# Patient Record
Sex: FEMALE | Race: WHITE | NOT HISPANIC OR LATINO | Employment: OTHER | URBAN - METROPOLITAN AREA
[De-identification: names, ages, dates, MRNs, and addresses within clinical notes are randomized per-mention and may not be internally consistent; named-entity substitution may affect disease eponyms.]

---

## 2017-02-06 ENCOUNTER — TELEPHONE (OUTPATIENT)
Dept: PEDIATRICS | Facility: CLINIC | Age: 57
End: 2017-02-06

## 2017-02-06 ENCOUNTER — OFFICE VISIT (OUTPATIENT)
Dept: PEDIATRICS | Facility: CLINIC | Age: 57
End: 2017-02-06
Payer: OTHER MISCELLANEOUS

## 2017-02-06 VITALS
WEIGHT: 156.3 LBS | HEIGHT: 69 IN | SYSTOLIC BLOOD PRESSURE: 108 MMHG | TEMPERATURE: 98 F | BODY MASS INDEX: 23.15 KG/M2 | OXYGEN SATURATION: 97 % | DIASTOLIC BLOOD PRESSURE: 62 MMHG | HEART RATE: 62 BPM

## 2017-02-06 DIAGNOSIS — S76.211A GROIN STRAIN, RIGHT, INITIAL ENCOUNTER: Primary | ICD-10-CM

## 2017-02-06 PROCEDURE — 99213 OFFICE O/P EST LOW 20 MIN: CPT | Performed by: PHYSICIAN ASSISTANT

## 2017-02-06 RX ORDER — CYCLOBENZAPRINE HCL 10 MG
10 TABLET ORAL 3 TIMES DAILY PRN
Qty: 15 TABLET | Refills: 0 | Status: SHIPPED | OUTPATIENT
Start: 2017-02-06 | End: 2017-07-14

## 2017-02-06 NOTE — PROGRESS NOTES
"  SUBJECTIVE:                                                    Latesha Ruiz is a 57 year old female who presents to clinic today for the following health issues:    Concern - Groin pain - right     Onset: over a week     Lifted a bin with books and felt a pulling sensation    Began with back pain and has improved    Description:   Dull ache    Intensity: 3/10 currently, worst - 4-5/10    Progression of Symptoms:  same and constant    Accompanying Signs & Symptoms:     Lump present    No difficulty urinating    Slight constipation and improved    No history of kidney stones   Previous history of similar problem:   Has had back problems previously    Precipitating factors:   Worsened by: sitting  Pinching sensation in leg   Difficulty sleeping   No weakness in legs  No numbness or tingling    Alleviating factors:  Improved by: walking       Therapies Tried and outcome: ibuprofen, tylenol, heat pads, stretching: Symptoms not alleviated  Work: teaches music part time.  Problem list, Medication list, Allergies, and Medical/Social/Surgical histories reviewed in EPIC and updated as appropriate.    ROS:  ROS otherwise negative    OBJECTIVE:                                                    /62 mmHg  Pulse 62  Temp(Src) 98  F (36.7  C) (Tympanic)  Ht 5' 9.25\" (1.759 m)  Wt 156 lb 4.8 oz (70.897 kg)  BMI 22.91 kg/m2  SpO2 97%  Body mass index is 22.91 kg/(m^2).   GENERAL: alert, no distress  ABDOMEN: soft, no tenderness  Lumber/Thoracic Spine Exam: Tender:  Right low back tenderness--mild   Range of Motion: full  Strength:  Full  HIP: mildly tender to palpation over the mid groin. No swelling, redness, bruising. Full ROM.   Diagnostic test results:  No results found for this or any previous visit (from the past 24 hour(s)).     ASSESSMENT/PLAN:                                                    (B44.354M) Groin strain, right, initial encounter  (primary encounter diagnosis)  Comment: differential includes " lbp with sciatica. Likely strain after recent activity. Heat/ice, gentle stretches, muscle relaxant PRN.   Plan: cyclobenzaprine (FLEXERIL) 10 MG tablet          See Patient Instructions    Clementine Bueno PA-C  [facility]

## 2017-02-06 NOTE — MR AVS SNAPSHOT
"              After Visit Summary   2/6/2017    Latesha Ruiz    MRN: [id number]           Patient Information     Date Of Birth          1960        Visit Information        Provider Department      2/6/2017 9:30 AM Clementine Bueno PA-C [facility]        Today's Diagnoses     Groin strain, right, initial encounter    -  1       Care Instructions    Begin flexeril as needed  Heat/ice   Gentle stretches        Follow-ups after your visit        Who to contact     If you have questions or need follow up information about today's clinic visit or your schedule please contact [facility] directly at [phone].  Normal or non-critical lab and imaging results will be communicated to you by Gamzeehart, letter or phone within 4 business days after the clinic has received the results. If you do not hear from us within 7 days, please contact the clinic through Gamzeehart or phone. If you have a critical or abnormal lab result, we will notify you by phone as soon as possible.  Submit refill requests through Telepo or call your pharmacy and they will forward the refill request to us. Please allow 3 business days for your refill to be completed.          Additional Information About Your Visit        MyChart Information     Telepo gives you secure access to your electronic health record. If you see a primary care provider, you can also send messages to your care team and make appointments. If you have questions, please call your primary care clinic.  If you do not have a primary care provider, please call [phone] and they will assist you.        Care EveryWhere ID     This is your Care EveryWhere ID. This could be used by other organizations to access your [place] medical records  [id number]        Your Vitals Were     Pulse Temperature Height BMI (Body Mass Index) Pulse Oximetry       62 98  F (36.7  C) (Tympanic) 5' 9.25\" (1.759 m) 22.91 kg/m2 97%        Blood Pressure from " Last 3 Encounters:   02/06/17 108/62   12/17/16 98/60   09/02/16 116/74    Weight from Last 3 Encounters:   02/06/17 156 lb 4.8 oz (70.897 kg)   12/17/16 160 lb (72.576 kg)   09/02/16 159 lb (72.122 kg)              Today, you had the following     No orders found for display         Today's Medication Changes          These changes are accurate as of: 2/6/17 10:21 AM.  If you have any questions, ask your nurse or doctor.               Start taking these medicines.        Dose/Directions    cyclobenzaprine 10 MG tablet   Commonly known as:  FLEXERIL   Used for:  Groin strain, right, initial encounter   Started by:  Clementine Bueno PA-C        Dose:  10 mg   Take 1 tablet (10 mg) by mouth 3 times daily as needed for muscle spasms   Quantity:  15 tablet   Refills:  0            Where to get your medicines      These medications were sent to [facility] - [facility] [address]  [address], [facility] [address]     Phone:  [phone]    - cyclobenzaprine 10 MG tablet             Primary Care Provider Office Phone # Fax #    Maday Anaya [phone]       [facility] [address] [place] [address]        Thank you!     Thank you for choosing [facility]]  for your care. Our goal is always to provide you with excellent care. Hearing back from our patients is one way we can continue to improve our services. Please take a few minutes to complete the written survey that you may receive in the mail after your visit with us. Thank you!             Your Updated Medication List - Protect others around you: Learn how to safely use, store and throw away your medicines at www.disposemymeds.org.          This list is accurate as of: 2/6/17 10:21 AM.  Always use your most recent med list.                   Brand Name Dispense Instructions for use    cyclobenzaprine 10 MG tablet    FLEXERIL    15  tablet    Take 1 tablet (10 mg) by mouth 3 times daily as needed for muscle spasms

## 2017-02-06 NOTE — TELEPHONE ENCOUNTER
Patient states she has a history of back problems but it hadn't been an issue for a while. States she lifed something last week on Monday and has had pain into the hip ever since. States she attempted to be seen at [facility] and was told that she needed a referral. States she would like to be seen by a provider to see if that is the appropriate route to go. Appointment scheduled.

## 2017-02-06 NOTE — NURSING NOTE
"Chief Complaint   Patient presents with     Back Pain       Initial /62 mmHg  Pulse 62  Temp(Src) 98  F (36.7  C) (Tympanic)  Ht 5' 9.25\" (1.759 m)  Wt 156 lb 4.8 oz (70.897 kg)  BMI 22.91 kg/m2  SpO2 97% Estimated body mass index is 22.91 kg/(m^2) as calculated from the following:    Height as of this encounter: 5' 9.25\" (1.759 m).    Weight as of this encounter: 156 lb 4.8 oz (70.897 kg).  Medication Reconciliation: complete   Trinidad Santos CMA      "

## 2017-03-07 ENCOUNTER — OFFICE VISIT (OUTPATIENT)
Dept: URGENT CARE | Facility: URGENT CARE | Age: 57
End: 2017-03-07
Payer: COMMERCIAL

## 2017-03-07 VITALS
SYSTOLIC BLOOD PRESSURE: 108 MMHG | DIASTOLIC BLOOD PRESSURE: 56 MMHG | WEIGHT: 155 LBS | OXYGEN SATURATION: 98 % | TEMPERATURE: 100.5 F | HEART RATE: 95 BPM | BODY MASS INDEX: 22.72 KG/M2

## 2017-03-07 DIAGNOSIS — J02.9 ACUTE PHARYNGITIS, UNSPECIFIED: Primary | ICD-10-CM

## 2017-03-07 LAB
DEPRECATED S PYO AG THROAT QL EIA: NORMAL
MICRO REPORT STATUS: NORMAL
SPECIMEN SOURCE: NORMAL

## 2017-03-07 PROCEDURE — 87081 CULTURE SCREEN ONLY: CPT | Performed by: PHYSICIAN ASSISTANT

## 2017-03-07 PROCEDURE — 87880 STREP A ASSAY W/OPTIC: CPT | Performed by: PHYSICIAN ASSISTANT

## 2017-03-07 PROCEDURE — 99213 OFFICE O/P EST LOW 20 MIN: CPT | Performed by: FAMILY MEDICINE

## 2017-03-07 NOTE — MR AVS SNAPSHOT
After Visit Summary   3/7/2017    Latesha Ruiz    MRN: [id number]           Patient Information     Date Of Birth          1960        Visit Information        Provider Department      3/7/2017 5:00 PM Fidelina Newman MD [facility]        Today's Diagnoses     Acute pharyngitis, unspecified    -  1      Care Instructions    Ibuprofen 600 mg three times a day for 2-3 days.    Gargle with salt water.    We will call you if your strep culture turns positive.        Follow-ups after your visit        Who to contact     If you have questions or need follow up information about today's clinic visit or your schedule please contact [facility] directly at [phone].  Normal or non-critical lab and imaging results will be communicated to you by MyChart, letter or phone within 4 business days after the clinic has received the results. If you do not hear from us within 7 days, please contact the clinic through [employer] Grouphart or phone. If you have a critical or abnormal lab result, we will notify you by phone as soon as possible.  Submit refill requests through Distech Controls or call your pharmacy and they will forward the refill request to us. Please allow 3 business days for your refill to be completed.          Additional Information About Your Visit        MyChart Information     Dist[employer] gives you secure access to your electronic health record. If you see a primary care provider, you can also send messages to your care team and make appointments. If you have questions, please call your primary care clinic.  If you do not have a primary care provider, please call [phone] and they will assist you.        Care EveryWhere ID     This is your Care EveryWhere ID. This could be used by other organizations to access your [place] medical records  [id number]        Your Vitals Were     Pulse Temperature Pulse Oximetry BMI (Body Mass Index)          95 100.5  F (38.1  C) (Tympanic)  98% 22.72 kg/m2         Blood Pressure from Last 3 Encounters:   03/07/17 108/56   02/06/17 108/62   12/17/16 98/60    Weight from Last 3 Encounters:   03/07/17 155 lb (70.3 kg)   02/06/17 156 lb 4.8 oz (70.9 kg)   12/17/16 160 lb (72.6 kg)              We Performed the Following     Beta strep group A culture     Strep, Rapid Screen        Primary Care Provider Office Phone # Fax #    Maday Anaya [phone]       [address] [place] [address]        Thank you!     Thank you for choosing [facility]  for your care. Our goal is always to provide you with excellent care. Hearing back from our patients is one way we can continue to improve our services. Please take a few minutes to complete the written survey that you may receive in the mail after your visit with us. Thank you!             Your Updated Medication List - Protect others around you: Learn how to safely use, store and throw away your medicines at www.disposemymeds.org.          This list is accurate as of: 3/7/17  6:19 PM.  Always use your most recent med list.                   Brand Name Dispense Instructions for use    cyclobenzaprine 10 MG tablet    FLEXERIL    15 tablet    Take 1 tablet (10 mg) by mouth 3 times daily as needed for muscle spasms

## 2017-03-07 NOTE — NURSING NOTE
"Chief Complaint   Patient presents with     Urgent Care     Pharyngitis     Pt states since last night throat has felt swollen. Pt has not taken any otc medications.        Initial /56 (BP Location: Right arm, Patient Position: Chair, Cuff Size: Adult Regular)  Pulse 95  Temp 100.5  F (38.1  C) (Tympanic)  Wt 155 lb (70.3 kg)  SpO2 98%  BMI 22.72 kg/m2 Estimated body mass index is 22.72 kg/(m^2) as calculated from the following:    Height as of 2/6/17: 5' 9.25\" (1.759 m).    Weight as of this encounter: 155 lb (70.3 kg).  Medication Reconciliation: unable or not appropriate to perform.  Columba Agosto CMA ([facility]) 3/7/2017 5:47 PM    "

## 2017-03-08 NOTE — PATIENT INSTRUCTIONS
Ibuprofen 600 mg three times a day for 2-3 days.    Gargle with salt water.    We will call you if your strep culture turns positive.

## 2017-03-08 NOTE — PROGRESS NOTES
SUBJECTIVE:  Latesha Ruiz is a 57 year old female with a chief complaint of sore throat.  Onset of symptoms was last night.    Course of illness: sudden onset and still present.  Severity moderate  Current and Associated symptoms: cough   Treatment measures tried include None tried.  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    Past Medical History   Diagnosis Date     AK (actinic keratosis) 6/17/2013     Allergic rhinitis, cause unspecified      Closed fracture of metacarpal bone(s), site unspecified 12/04     Right 5th metacarpal fx--caught in a door     Closed fracture of rib(s), unspecified ~1995     Contact dermatitis and other eczema, due to unspecified cause      Disorder of bone and cartilage, unspecified ~4/2000     T-score -2.00 in L-spine in 5/03     Lumbago      Premature menopause      Elevated FSH/LH in 1993--normal brain MRI 12/2001     Current Outpatient Prescriptions   Medication Sig Dispense Refill     cyclobenzaprine (FLEXERIL) 10 MG tablet Take 1 tablet (10 mg) by mouth 3 times daily as needed for muscle spasms (Patient not taking: Reported on 3/7/2017) 15 tablet 0     Social History   Substance Use Topics     Smoking status: Never Smoker     Smokeless tobacco: Never Used     Alcohol use No       ROS:  Review of systems negative except as stated above.    OBJECTIVE:   /56 (BP Location: Right arm, Patient Position: Chair, Cuff Size: Adult Regular)  Pulse 95  Temp 100.5  F (38.1  C) (Tympanic)  Wt 155 lb (70.3 kg)  SpO2 98%  BMI 22.72 kg/m2  GENERAL APPEARANCE: healthy, alert and no distress EYES: anicteric sclerae, conjunctivae clear HENT: ear canals and TM's normal. Pharynx erythematous, no exudates NECK: supple, non-tender to palpation, anterior cervical adenopathy noted RESP: lungs clear to auscultation - no rales, rhonchi or wheezes CV: regular rate and rhythm, normal S1 S2, no murmur noted SKIN: no  suspicious lesions or rashes    Rapid Strep test is negative.  Culture pending.    ASSESSMENT:  Pharyngitis, likely viral    PLAN:   I discussed with the patient that a confirmatory strep culture will be performed and that she will be called if the culture is positive for strep.  We discussed other possible causes of pharyngitis including viruses.  Symptomatic treat with gargles, lozenges, and OTC analgesic as needed.  Recommend ibuprofen 600 mg TID for 2-3 days.  Follow-up with primary clinic if not improving over the next few days, sooner if significantly worse.

## 2017-03-09 LAB
BACTERIA SPEC CULT: NORMAL
MICRO REPORT STATUS: NORMAL
SPECIMEN SOURCE: NORMAL

## 2017-03-13 ENCOUNTER — OFFICE VISIT (OUTPATIENT)
Dept: URGENT CARE | Facility: URGENT CARE | Age: 57
End: 2017-03-13
Payer: COMMERCIAL

## 2017-03-13 VITALS
HEART RATE: 85 BPM | WEIGHT: 155 LBS | TEMPERATURE: 98.5 F | SYSTOLIC BLOOD PRESSURE: 112 MMHG | BODY MASS INDEX: 22.72 KG/M2 | DIASTOLIC BLOOD PRESSURE: 60 MMHG | OXYGEN SATURATION: 97 %

## 2017-03-13 DIAGNOSIS — J98.01 ACUTE BRONCHOSPASM: ICD-10-CM

## 2017-03-13 DIAGNOSIS — R05.9 COUGH: Primary | ICD-10-CM

## 2017-03-13 PROCEDURE — 99213 OFFICE O/P EST LOW 20 MIN: CPT | Performed by: PHYSICIAN ASSISTANT

## 2017-03-13 RX ORDER — AZITHROMYCIN 250 MG/1
TABLET, FILM COATED ORAL
Qty: 6 TABLET | Refills: 0 | Status: SHIPPED | OUTPATIENT
Start: 2017-03-13 | End: 2017-07-14

## 2017-03-13 RX ORDER — ALBUTEROL SULFATE 90 UG/1
2 AEROSOL, METERED RESPIRATORY (INHALATION) EVERY 4 HOURS PRN
Qty: 1 INHALER | Refills: 0 | Status: SHIPPED | OUTPATIENT
Start: 2017-03-13 | End: 2017-07-14

## 2017-03-13 RX ORDER — PREDNISONE 20 MG/1
40 TABLET ORAL DAILY
Qty: 10 TABLET | Refills: 0 | Status: SHIPPED | OUTPATIENT
Start: 2017-03-13 | End: 2017-03-18

## 2017-03-13 NOTE — MR AVS SNAPSHOT
After Visit Summary   3/13/2017    Latesha Ruiz    MRN: [id number]           Patient Information     Date Of Birth          1960        Visit Information        Provider Department      3/13/2017 9:00 AM Mikaela Carbera PA-C [facility]        Today's Diagnoses     Cough    -  1    Acute bronchospasm           Follow-ups after your visit        Who to contact     If you have questions or need follow up information about today's clinic visit or your schedule please contact [facility] directly at [phone].  Normal or non-critical lab and imaging results will be communicated to you by Correlixhart, letter or phone within 4 business days after the clinic has received the results. If you do not hear from us within 7 days, please contact the clinic through Recensust or phone. If you have a critical or abnormal lab result, we will notify you by phone as soon as possible.  Submit refill requests through Learnhive or call your pharmacy and they will forward the refill request to us. Please allow 3 business days for your refill to be completed.          Additional Information About Your Visit        MyChart Information     Learnhive gives you secure access to your electronic health record. If you see a primary care provider, you can also send messages to your care team and make appointments. If you have questions, please call your primary care clinic.  If you do not have a primary care provider, please call [phone] and they will assist you.        Care EveryWhere ID     This is your Care EveryWhere ID. This could be used by other organizations to access your [place] medical records  [id number]        Your Vitals Were     Pulse Temperature Pulse Oximetry BMI (Body Mass Index)          85 98.5  F (36.9  C) (Tympanic) 97% 22.72 kg/m2         Blood Pressure from Last 3 Encounters:   03/13/17 112/60   03/07/17 108/56   02/06/17 108/62    Weight from Last 3 Encounters:    03/13/17 155 lb (70.3 kg)   03/07/17 155 lb (70.3 kg)   02/06/17 156 lb 4.8 oz (70.9 kg)              Today, you had the following     No orders found for display         Today's Medication Changes          These changes are accurate as of: 3/13/17  9:39 AM.  If you have any questions, ask your nurse or doctor.               Start taking these medicines.        Dose/Directions    albuterol 108 (90 BASE) MCG/ACT Inhaler   Commonly known as:  PROAIR HFA/PROVENTIL HFA/VENTOLIN HFA   Used for:  Cough, Acute bronchospasm   Started by:  Mikaela Cabrera PA-C        Dose:  2 puff   Inhale 2 puffs into the lungs every 4 hours as needed for shortness of breath / dyspnea or wheezing   Quantity:  1 Inhaler   Refills:  0       azithromycin 250 MG tablet   Commonly known as:  ZITHROMAX   Used for:  Cough   Started by:  Mikaela Cabrera PA-C        Two tablets first day, then one tablet daily for four days.   Quantity:  6 tablet   Refills:  0       predniSONE 20 MG tablet   Commonly known as:  DELTASONE   Used for:  Cough, Acute bronchospasm   Started by:  Mikaela Cabrera PA-C        Dose:  40 mg   Take 2 tablets (40 mg) by mouth daily for 5 days   Quantity:  10 tablet   Refills:  0            Where to get your medicines      These medications were sent to [facility] - [facility] [address]  [address], [facility] [address]     Phone:  [phone]     albuterol 108 (90 BASE) MCG/ACT Inhaler    azithromycin 250 MG tablet    predniSONE 20 MG tablet                Primary Care Provider Office Phone # Fax #    Maday Anaya [phone]       [facility] [address] [place] [address] [address] [place] [address]        Thank you!     Thank you for choosing [facility]  for your care. Our goal is always to provide you with excellent care. Hearing back from our patients is one way we can continue to  improve our services. Please take a few minutes to complete the written survey that you may receive in the mail after your visit with us. Thank you!             Your Updated Medication List - Protect others around you: Learn how to safely use, store and throw away your medicines at www.disposemymeds.org.          This list is accurate as of: 3/13/17  9:39 AM.  Always use your most recent med list.                   Brand Name Dispense Instructions for use    albuterol 108 (90 BASE) MCG/ACT Inhaler    PROAIR HFA/PROVENTIL HFA/VENTOLIN HFA    1 Inhaler    Inhale 2 puffs into the lungs every 4 hours as needed for shortness of breath / dyspnea or wheezing       azithromycin 250 MG tablet    ZITHROMAX    6 tablet    Two tablets first day, then one tablet daily for four days.       cyclobenzaprine 10 MG tablet    FLEXERIL    15 tablet    Take 1 tablet (10 mg) by mouth 3 times daily as needed for muscle spasms       predniSONE 20 MG tablet    DELTASONE    10 tablet    Take 2 tablets (40 mg) by mouth daily for 5 days

## 2017-03-13 NOTE — NURSING NOTE
"Chief Complaint   Patient presents with     Urgent Care     Cough     Pt states still having a cough with green phlegm x 1 week ago.        Initial /60 (BP Location: Right arm, Patient Position: Chair, Cuff Size: Adult Regular)  Pulse 85  Temp 98.5  F (36.9  C) (Tympanic)  Wt 155 lb (70.3 kg)  SpO2 97%  BMI 22.72 kg/m2 Estimated body mass index is 22.72 kg/(m^2) as calculated from the following:    Height as of 2/6/17: 5' 9.25\" (1.759 m).    Weight as of this encounter: 155 lb (70.3 kg).  Medication Reconciliation: unable or not appropriate to perform   Columba Agosto CMA ([facility]) 3/13/2017 9:11 AM    "

## 2017-03-13 NOTE — PROGRESS NOTES
SUBJECTIVE:   Latesha Ruiz is a 57 year old female presenting with a chief complaint of cough for the past week that is productive of green mucous.  Started with ST.  No real nasal congestion.  Low grade fevers earlier in the week.  Has no SOB or chest pain . Worries about lungs.  Gets in to coughing attacks  Onset of symptoms was 1 week(s) ago.  Course of illness is same.    Severity moderate  Current and Associated symptoms: none  Treatment measures tried include Fluids.  Predisposing factors include None.    Past Medical History   Diagnosis Date     AK (actinic keratosis) 6/17/2013     Allergic rhinitis, cause unspecified      Closed fracture of metacarpal bone(s), site unspecified 12/04     Right 5th metacarpal fx--caught in a door     Closed fracture of rib(s), unspecified ~1995     Contact dermatitis and other eczema, due to unspecified cause      Disorder of bone and cartilage, unspecified ~4/2000     T-score -2.00 in L-spine in 5/03     Lumbago      Premature menopause      Elevated FSH/LH in 1993--normal brain MRI 12/2001     Current Outpatient Prescriptions   Medication Sig Dispense Refill     predniSONE (DELTASONE) 20 MG tablet Take 2 tablets (40 mg) by mouth daily for 5 days 10 tablet 0     azithromycin (ZITHROMAX) 250 MG tablet Two tablets first day, then one tablet daily for four days. 6 tablet 0     albuterol (PROAIR HFA/PROVENTIL HFA/VENTOLIN HFA) 108 (90 BASE) MCG/ACT Inhaler Inhale 2 puffs into the lungs every 4 hours as needed for shortness of breath / dyspnea or wheezing 1 Inhaler 0     cyclobenzaprine (FLEXERIL) 10 MG tablet Take 1 tablet (10 mg) by mouth 3 times daily as needed for muscle spasms (Patient not taking: Reported on 3/7/2017) 15 tablet 0     Social History   Substance Use Topics     Smoking status: Never Smoker     Smokeless tobacco: Never Used     Alcohol use No       ROS:  Review of systems negative except as stated above.    OBJECTIVE  :/60 (BP Location: Right arm,  Patient Position: Chair, Cuff Size: Adult Regular)  Pulse 85  Temp 98.5  F (36.9  C) (Tympanic)  Wt 155 lb (70.3 kg)  SpO2 97%  BMI 22.72 kg/m2  GENERAL APPEARANCE: healthy, alert and no distress  EYES: EOMI,  PERRL, conjunctiva clear  HENT: ear canals and TM's normal.  Nose and mouth without ulcers, erythema or lesions  NECK: supple, nontender, no lymphadenopathy  RESP: scattered upper airway rhonchi.    CV: regular rates and rhythm, normal S1 S2, no murmur noted  NEURO: Normal strength and tone, sensory exam grossly normal,  normal speech and mentation  SKIN: no suspicious lesions or rashes    assessment/plan:  (R05) Cough  (primary encounter diagnosis)  Comment:   Plan: predniSONE (DELTASONE) 20 MG tablet,         azithromycin (ZITHROMAX) 250 MG tablet,         albuterol (PROAIR HFA/PROVENTIL HFA/VENTOLIN         HFA) 108 (90 BASE) MCG/ACT Inhaler        Appears to be viral at this time and Prednisone burst and inhaler as needed.  To start Zithromax at end of week if not improved or fevers develop.  Continue with OTC med for sx relief.      (J98.01) Acute bronchospasm  Comment:   Plan: predniSONE (DELTASONE) 20 MG tablet, albuterol         (PROAIR HFA/PROVENTIL HFA/VENTOLIN HFA) 108 (90        BASE) MCG/ACT Inhaler         As above

## 2017-07-14 ENCOUNTER — OFFICE VISIT (OUTPATIENT)
Dept: PEDIATRICS | Facility: CLINIC | Age: 57
End: 2017-07-14
Payer: COMMERCIAL

## 2017-07-14 VITALS
WEIGHT: 158.9 LBS | OXYGEN SATURATION: 98 % | BODY MASS INDEX: 23.3 KG/M2 | HEART RATE: 60 BPM | TEMPERATURE: 98.1 F | DIASTOLIC BLOOD PRESSURE: 62 MMHG | SYSTOLIC BLOOD PRESSURE: 100 MMHG

## 2017-07-14 DIAGNOSIS — K64.5 THROMBOSED EXTERNAL HEMORRHOIDS: Primary | ICD-10-CM

## 2017-07-14 DIAGNOSIS — J30.89 CHRONIC NONSEASONAL ALLERGIC RHINITIS DUE TO OTHER ALLERGEN: ICD-10-CM

## 2017-07-14 DIAGNOSIS — Z12.11 SCREEN FOR COLON CANCER: ICD-10-CM

## 2017-07-14 PROCEDURE — 99213 OFFICE O/P EST LOW 20 MIN: CPT | Performed by: INTERNAL MEDICINE

## 2017-07-14 RX ORDER — FLUTICASONE PROPIONATE 50 MCG
1 SPRAY, SUSPENSION (ML) NASAL DAILY
Qty: 1 BOTTLE | Refills: 11 | Status: SHIPPED | OUTPATIENT
Start: 2017-07-14 | End: 2018-06-27

## 2017-07-14 NOTE — MR AVS SNAPSHOT
After Visit Summary   7/14/2017    Latesha Ruiz    MRN: [id number]           Patient Information     Date Of Birth          1960        Visit Information        Provider Department      7/14/2017 3:30 PM Miesha Ortega MD [facility]        Today's Diagnoses     Thrombosed external hemorrhoids    -  1    Chronic nonseasonal allergic rhinitis due to other allergen        Screen for colon cancer          Care Instructions      Treating Hemorrhoids: Self-Care    Follow your healthcare provider s advice about caring for your hemorrhoids at home. Some treatments help relieve symptoms right away. Others involve making changes in your diet and exercise habits. These can help ease constipation and prevent hemorrhoid symptoms from coming back.  Relieving symptoms  Your healthcare provider may prescribe anti-inflammatory medicine to help ease your symptoms. The following tips will also help relieve pain and swelling.    Take sitz baths. Taking a sitz bath means sitting in a few inches of warm bath water. Soaking for 10 minutes twice a day can provide welcome relief from painful hemorrhoids. It can also help the area stay clean.    Develop good bowel habits. Use the bathroom when you need to. Don t ignore the urge to move your bowels. This can lead to constipation, hard stools, and straining. Also, don t read while on the toilet. Sit only as long as needed. Wipe gently with soft, unscented toilet tissue or baby wipes.    Use ice packs. Placing an ice pack on a thrombosed external hemorrhoid can help relieve pain right away. It will also help reduce the blood clot. Use the ice for 15 to 20 minutes at a time. Keep a cloth between the ice and your skin to prevent skin damage.    Use other measures. Laxatives and enemas can help ease constipation. But use them only on your healthcare provider s advice. For symptom relief, try using cotton pads soaked in witch hazel. These are available at most  drugstores. Over-the-counter hemorrhoid ointments and petroleum jelly can also provide relief.  Add fiber to your diet  Adding fiber to your diet can help relieve constipation by making stools softer and easier to pass. To increase your fiber intake, your healthcare provider may recommend a bulking agent, such as psyllium. This is a high-fiber supplement available at most grocery stores and drugstores. Eating more fiber-rich foods will also help. There are two types of fiber:    Insoluble fiber is the main ingredient in bulking agents. It s also found in foods such as wheat bran, whole-grain breads, fresh fruits, and vegetables.    Soluble fiber is found in foods such as oat bran. Although soluble fiber is good for you, it may not ease constipation as much as foods high in insoluble fiber.  Drink more water  Along with a high-fiber diet, drinking more water can help ease constipation. This is because insoluble fiber absorbs water, making stools soft and bulky. Be sure to drink plenty of water throughout the day. Drinking fruit juices, such as prune juice or apple juice, can also help prevent constipation.  Get more exercise  Regular exercise aids digestion and helps prevent constipation. It s also great for your health. So talk with your healthcare provider about starting an exercise program. Low-impact activities, such as swimming or walking, are good places to start. Take it easy at first. And remember to drink plenty of water when you exercise.  High-fiber foods  High-fiber foods offer many benefits. By making your stools softer, they help heal and prevent swollen hemorrhoids. They may also help reduce the risk of colon and rectal cancer. Best of all, they re usually low in calories and taste great. Here are some examples of fiber-rich foods.    Whole grains, such as wheat bran, corn bran, and brown rice.    Vegetables, especially carrots, broccoli, cabbage, and peas.    Fruits, such as apples, bananas, raisins,  peaches, and pears.    Nuts and legumes, especially peanuts, lentils, and kidney beans.  Easy ways to add fiber  The tips below offer some simple ways to add more high-fiber foods to your meals.    Start your day with a high-fiber breakfast. Eat a wheat bran cereal along with a sliced banana. Or, try peanut butter on whole-wheat toast.    Eat carrot sticks for snacks. They re easy to prepare, taste great, and are low in calories.    Use whole-grain breads instead of white bread for sandwiches.    Eat fruits for treats. Try an apple and some raisins instead of a candy bar.   Date Last Reviewed: 7/1/2016 2000-2017 [employer]. [address] [place] [address], [place] [address]. All rights reserved. This information is not intended as a substitute for professional medical care. Always follow your healthcare professional's instructions.        Thrombosed Hemorrhoids    Hemorrhoids are swollen veins in the lower rectum and anus. They're similar to varicose veins that form in the legs. Hemorrhoids can happen inside the rectum (internal hemorrhoids). Or one may form at the anal opening (external hemorrhoids). Although they may bleed, most hemorrhoids aren't cause for concern. But a small blood clot (thrombus) can develop in an external hemorrhoid. This may lead to severe pain and sometimes bleeding.  When to go to the emergency room (ER)  If you have severe pain or excessive bleeding, seek immediate medical care.  What to expect in the ER  A healthcare provider is likely to check your anus and rectum using a slender, lighted tube (anoscope or proctoscope). A local anesthetic is given to ease any discomfort.  Treatment  Treatment recommendations include the following:    If the blood clot has formed within the past 48 to 72 hours, your healthcare provider may remove it from within the hemorrhoid. This is a simple procedure that can relieve pain. You will have a local anesthetic to keep you pain-free during the  procedure. A small incision is made in the skin, and the blood clot is removed. Stitches are generally not needed.    If more than 72 hours have passed, your healthcare provider will suggest home treatments. Simple home treatments can relieve your pain. These may include warm baths, ointments, suppositories, and witch hazel compresses. Many thrombosed hemorrhoids go away on their own in a few weeks.    If you have persistent bleeding or painful hemorrhoids, talk to your healthcare provider about possible treatment with banding, ligation, or removal (hemorrhoidectomy).  Tips for preventing hemorrhoids  Tips include the following:    Eat foods that are high in fiber and use fiber supplements to help prevent constipation.    Drink plenty of liquids.    Get regular exercise to help prevent constipation and promote good bowel function.   Date Last Reviewed: 6/1/2016 2000-2017 [employer]. [address] [place] [address]. All rights reserved. This information is not intended as a substitute for professional medical care. Always follow your healthcare professional's instructions.                Follow-ups after your visit        Additional Services     GASTROENTEROLOGY ADULT REF PROCEDURE ONLY       Last Lab Result: Creatinine (mg/dL)       Date                     Value                 08/01/2016               0.78             ----------  Body mass index is 23.3 kg/(m^2).      Patient will be contacted to schedule procedure.     Please be aware that coverage of these services is subject to the terms and limitations of your health insurance plan.  Call member services at your health plan with any benefit or coverage questions.  Any procedures must be performed at a [facility] facility OR coordinated by your clinic's referral office.    Please bring the following with you to your appointment:    (1) Any X-Rays, CTs or MRIs which have been performed.  Contact the facility where they were done to  arrange for  prior to your scheduled appointment.    (2) List of current medications   (3) This referral request   (4) Any documents/labs given to you for this referral                  Who to contact     If you have questions or need follow up information about today's clinic visit or your schedule please contact [facility] directly at [phone].  Normal or non-critical lab and imaging results will be communicated to you by MyChart, letter or phone within 4 business days after the clinic has received the results. If you do not hear from us within 7 days, please contact the clinic through [facility] or phone. If you have a critical or abnormal lab result, we will notify you by phone as soon as possible.  Submit refill requests through [facility] or call your pharmacy and they will forward the refill request to us. Please allow 3 business days for your refill to be completed.          Additional Information About Your Visit        [facility] Information     [facility] gives you secure access to your electronic health record. If you see a primary care provider, you can also send messages to your care team and make appointments. If you have questions, please call your primary care clinic.  If you do not have a primary care provider, please call [phone] and they will assist you.        Care EveryWhere ID     This is your Care EveryWhere ID. This could be used by other organizations to access your [facility] medical records  [id number]        Your Vitals Were     Pulse Temperature Pulse Oximetry BMI (Body Mass Index)          60 98.1  F (36.7  C) (Tympanic) 98% 23.3 kg/m2         Blood Pressure from Last 3 Encounters:   07/14/17 100/62   03/13/17 112/60   03/07/17 108/56    Weight from Last 3 Encounters:   07/14/17 158 lb 14.4 oz (72.1 kg)   03/13/17 155 lb (70.3 kg)   03/07/17 155 lb (70.3 kg)              We Performed the Following     GASTROENTEROLOGY ADULT REF PROCEDURE ONLY          Today's Medication  Changes          These changes are accurate as of: 7/14/17  4:06 PM.  If you have any questions, ask your nurse or doctor.               Start taking these medicines.        Dose/Directions    fluticasone 50 MCG/ACT spray   Commonly known as:  FLONASE   Used for:  Chronic nonseasonal allergic rhinitis due to other allergen   Started by:  Miesha Ortega MD        Dose:  1 spray   Spray 1 spray into both nostrils daily   Quantity:  1 Bottle   Refills:  11            Where to get your medicines      These medications were sent to [id number] [address], [facility] [address]     Phone:  [phone]     fluticasone 50 MCG/ACT spray                Primary Care Provider Office Phone # Fax #    Maday Anaya [phone]       [facility] [address] [place] [address]        Equal Access to Services     [facility]: Hadii aad ku hadasho Soomaali, waaxda luqadaha, qaybta kaalmada adeegyada, waxay idiin hayaan mark khshauna menjivar . So [facility] [phone].    ATENCIÓN: Si habla español, tiene a martinez disposición servicios gratuitos de asistencia lingüística. Llame al [phone].    We comply with applicable federal civil rights laws and [place] laws. We do not discriminate on the basis of race, color, national origin, age, disability sex, sexual orientation or gender identity.            Thank you!     Thank you for choosing [facility]  for your care. Our goal is always to provide you with excellent care. Hearing back from our patients is one way we can continue to improve our services. Please take a few minutes to complete the written survey that you may receive in the mail after your visit with us. Thank you!             Your Updated Medication List - Protect others around you: Learn how to safely use, store and throw away your medicines at  www.disposemymeds.org.          This list is accurate as of: 7/14/17  4:06 PM.  Always use your most recent med list.                   Brand Name Dispense Instructions for use Diagnosis    fluticasone 50 MCG/ACT spray    FLONASE    1 Bottle    Spray 1 spray into both nostrils daily    Chronic nonseasonal allergic rhinitis due to other allergen

## 2017-07-14 NOTE — PROGRESS NOTES
SUBJECTIVE:                                                    Latesha Ruiz is a 57 year old female who presents to clinic today for the following health issues:      Hemorrhoids       Duration: 1-2 weeks ago    Description:   Pain: YES- but more irritation  Itching: no     Accompanying signs and symptoms:   Blood in stool: had some in underwear but not on stool  Changes in stool pattern: no.  Tends to diarrhea, no constipation     History (similar episodes/previous evaluation): had a fissure in college with pain    Precipitating or alleviating factors: None    Therapies tried and outcome: none      Problem list and histories reviewed & adjusted, as indicated.  Additional history: as documented    Labs reviewed in EPIC    Reviewed and updated as needed this visit by clinical staff  Tobacco  Allergies  Meds  Problems  Med Hx  Surg Hx  Fam Hx  Soc Hx        Reviewed and updated as needed this visit by Provider  Allergies  Meds  Problems           OBJECTIVE:     /62 (BP Location: Right arm, Patient Position: Right side, Cuff Size: Adult Regular)  Pulse 60  Temp 98.1  F (36.7  C) (Tympanic)  Wt 158 lb 14.4 oz (72.1 kg)  SpO2 98%  BMI 23.3 kg/m2  Body mass index is 23.3 kg/(m^2).  GENERAL: healthy, alert and no distress  RECTAL (female): external thrombosed hemorrhoid, about 1cm diam, mildly tender        ASSESSMENT/PLAN:       1. Thrombosed external hemorrhoids  Given time of symptoms, not amenable to surgical treatment at this time.  Symptomatic cares, prevention discussed and discussed banding with GI or CR surgery at colonoscopy    2. Chronic nonseasonal allergic rhinitis due to other allergen  refilled  - fluticasone (FLONASE) 50 MCG/ACT spray; Spray 1 spray into both nostrils daily  Dispense: 1 Bottle; Refill: 11    3. Screen for colon cancer  Due, referral placed  - GASTROENTEROLOGY ADULT REF PROCEDURE ONLY    See Patient Instructions    Miesha Ortega MD  [facility]

## 2017-07-14 NOTE — PATIENT INSTRUCTIONS
Treating Hemorrhoids: Self-Care    Follow your healthcare provider s advice about caring for your hemorrhoids at home. Some treatments help relieve symptoms right away. Others involve making changes in your diet and exercise habits. These can help ease constipation and prevent hemorrhoid symptoms from coming back.  Relieving symptoms  Your healthcare provider may prescribe anti-inflammatory medicine to help ease your symptoms. The following tips will also help relieve pain and swelling.    Take sitz baths. Taking a sitz bath means sitting in a few inches of warm bath water. Soaking for 10 minutes twice a day can provide welcome relief from painful hemorrhoids. It can also help the area stay clean.    Develop good bowel habits. Use the bathroom when you need to. Don t ignore the urge to move your bowels. This can lead to constipation, hard stools, and straining. Also, don t read while on the toilet. Sit only as long as needed. Wipe gently with soft, unscented toilet tissue or baby wipes.    Use ice packs. Placing an ice pack on a thrombosed external hemorrhoid can help relieve pain right away. It will also help reduce the blood clot. Use the ice for 15 to 20 minutes at a time. Keep a cloth between the ice and your skin to prevent skin damage.    Use other measures. Laxatives and enemas can help ease constipation. But use them only on your healthcare provider s advice. For symptom relief, try using cotton pads soaked in witch hazel. These are available at most drugstores. Over-the-counter hemorrhoid ointments and petroleum jelly can also provide relief.  Add fiber to your diet  Adding fiber to your diet can help relieve constipation by making stools softer and easier to pass. To increase your fiber intake, your healthcare provider may recommend a bulking agent, such as psyllium. This is a high-fiber supplement available at most grocery stores and drugstores. Eating more fiber-rich foods will also help. There are two  types of fiber:    Insoluble fiber is the main ingredient in bulking agents. It s also found in foods such as wheat bran, whole-grain breads, fresh fruits, and vegetables.    Soluble fiber is found in foods such as oat bran. Although soluble fiber is good for you, it may not ease constipation as much as foods high in insoluble fiber.  Drink more water  Along with a high-fiber diet, drinking more water can help ease constipation. This is because insoluble fiber absorbs water, making stools soft and bulky. Be sure to drink plenty of water throughout the day. Drinking fruit juices, such as prune juice or apple juice, can also help prevent constipation.  Get more exercise  Regular exercise aids digestion and helps prevent constipation. It s also great for your health. So talk with your healthcare provider about starting an exercise program. Low-impact activities, such as swimming or walking, are good places to start. Take it easy at first. And remember to drink plenty of water when you exercise.  High-fiber foods  High-fiber foods offer many benefits. By making your stools softer, they help heal and prevent swollen hemorrhoids. They may also help reduce the risk of colon and rectal cancer. Best of all, they re usually low in calories and taste great. Here are some examples of fiber-rich foods.    Whole grains, such as wheat bran, corn bran, and brown rice.    Vegetables, especially carrots, broccoli, cabbage, and peas.    Fruits, such as apples, bananas, raisins, peaches, and pears.    Nuts and legumes, especially peanuts, lentils, and kidney beans.  Easy ways to add fiber  The tips below offer some simple ways to add more high-fiber foods to your meals.    Start your day with a high-fiber breakfast. Eat a wheat bran cereal along with a sliced banana. Or, try peanut butter on whole-wheat toast.    Eat carrot sticks for snacks. They re easy to prepare, taste great, and are low in calories.    Use whole-grain breads  instead of white bread for sandwiches.    Eat fruits for treats. Try an apple and some raisins instead of a candy bar.   Date Last Reviewed: 7/1/2016 2000-2017 [employer]. [address] [place] [address], [place] [address]. All rights reserved. This information is not intended as a substitute for professional medical care. Always follow your healthcare professional's instructions.        Thrombosed Hemorrhoids    Hemorrhoids are swollen veins in the lower rectum and anus. They're similar to varicose veins that form in the legs. Hemorrhoids can happen inside the rectum (internal hemorrhoids). Or one may form at the anal opening (external hemorrhoids). Although they may bleed, most hemorrhoids aren't cause for concern. But a small blood clot (thrombus) can develop in an external hemorrhoid. This may lead to severe pain and sometimes bleeding.  When to go to the emergency room (ER)  If you have severe pain or excessive bleeding, seek immediate medical care.  What to expect in the ER  A healthcare provider is likely to check your anus and rectum using a slender, lighted tube (anoscope or proctoscope). A local anesthetic is given to ease any discomfort.  Treatment  Treatment recommendations include the following:    If the blood clot has formed within the past 48 to 72 hours, your healthcare provider may remove it from within the hemorrhoid. This is a simple procedure that can relieve pain. You will have a local anesthetic to keep you pain-free during the procedure. A small incision is made in the skin, and the blood clot is removed. Stitches are generally not needed.    If more than 72 hours have passed, your healthcare provider will suggest home treatments. Simple home treatments can relieve your pain. These may include warm baths, ointments, suppositories, and witch hazel compresses. Many thrombosed hemorrhoids go away on their own in a few weeks.    If you have persistent bleeding or painful hemorrhoids,  talk to your healthcare provider about possible treatment with banding, ligation, or removal (hemorrhoidectomy).  Tips for preventing hemorrhoids  Tips include the following:    Eat foods that are high in fiber and use fiber supplements to help prevent constipation.    Drink plenty of liquids.    Get regular exercise to help prevent constipation and promote good bowel function.   Date Last Reviewed: 6/1/2016 2000-2017 [employer]. [address] [place] [address], [place] [address]. All rights reserved. This information is not intended as a substitute for professional medical care. Always follow your healthcare professional's instructions.

## 2017-07-26 ENCOUNTER — HOSPITAL ENCOUNTER (OUTPATIENT)
Dept: MAMMOGRAPHY | Facility: CLINIC | Age: 57
Discharge: HOME | End: 2017-07-26
Attending: PEDIATRICS | Admitting: PEDIATRICS
Payer: COMMERCIAL

## 2017-07-26 ENCOUNTER — OFFICE VISIT (OUTPATIENT)
Dept: SURGERY | Facility: CLINIC | Age: 57
End: 2017-07-26
Payer: COMMERCIAL

## 2017-07-26 VITALS
SYSTOLIC BLOOD PRESSURE: 92 MMHG | HEIGHT: 69 IN | WEIGHT: 157 LBS | HEART RATE: 64 BPM | OXYGEN SATURATION: 98 % | DIASTOLIC BLOOD PRESSURE: 60 MMHG | BODY MASS INDEX: 23.25 KG/M2

## 2017-07-26 DIAGNOSIS — Z12.31 VISIT FOR SCREENING MAMMOGRAM: ICD-10-CM

## 2017-07-26 DIAGNOSIS — K64.5 THROMBOSED EXTERNAL HEMORRHOIDS: Primary | ICD-10-CM

## 2017-07-26 PROCEDURE — 99203 OFFICE O/P NEW LOW 30 MIN: CPT | Performed by: SURGERY

## 2017-07-26 PROCEDURE — 77063 BREAST TOMOSYNTHESIS BI: CPT

## 2017-07-26 NOTE — MR AVS SNAPSHOT
After Visit Summary   7/26/2017    Latesha Ruiz    MRN: [id number]           Patient Information     Date Of Birth          1960        Visit Information        Provider Department      7/26/2017 10:45 AM Mario Leyva MD [facility]      Today's Diagnoses     Thrombosed external hemorrhoids    -  1       Follow-ups after your visit        Your next 10 appointments already scheduled     Aug 03, 2017   Procedure with Mario Leyva MD   [facility] ([facility])    [address]  [facility] [address]   [phone]           [facility] is located at [address] [place]            Aug 03, 2017 12:45 PM CDT   [facility] End[facility] with Mario Leyva MD   [facility] ([facility]    [facility])   [phone]              Who to contact     If you have questions or need follow up information about today's clinic visit or your schedule please contact [facility] directly at [phone].  Normal or non-critical lab and imaging results will be communicated to you by Travadorhart, letter or phone within 4 business days after the clinic has received the results. If you do not hear from us within 7 days, please contact the clinic through Travadorhart or phone. If you have a critical or abnormal lab result, we will notify you by phone as soon as possible.  Submit refill requests through iConclude or call your pharmacy and they will forward the refill request to us. Please allow 3 business days for your refill to be completed.          Additional Information About Your Visit        MyChart Information     iConclude gives you secure access to your electronic health record. If you see a primary care provider, you can also send messages to your  "care team and make appointments. If you have questions, please call your primary care clinic.  If you do not have a primary care provider, please call [phone] and they will assist you.        Care EveryWhere ID     This is your Care EveryWhere ID. This could be used by other organizations to access your [place] medical records  [id number]        Your Vitals Were     Pulse Height Pulse Oximetry BMI (Body Mass Index)          64 5' 9\" (1.753 m) 98% 23.18 kg/m2         Blood Pressure from Last 3 Encounters:   07/30/17 90/50   07/26/17 92/60   07/14/17 100/62    Weight from Last 3 Encounters:   07/30/17 160 lb 1.6 oz (72.6 kg)   07/26/17 157 lb (71.2 kg)   07/14/17 158 lb 14.4 oz (72.1 kg)              Today, you had the following     No orders found for display       Primary Care Provider Office Phone # Fax #    Maday Anaya [phone]       [place] [facility] [address] [place] [address]        Equal Access to Services     [facility] AH: Hadii aad ku hadasho Soomaali, waaxda luqadaha, qaybta kaalmada adelorayafrancesco, guillaume menjivar . So [facility] [phone].    ATENCIÓN: Si habla español, tiene a martinez disposición servicios gratuitos de asistencia lingüística. [facility] [phone].    We comply with applicable federal civil rights laws and [place] laws. We do not discriminate on the basis of race, color, national origin, age, disability sex, sexual orientation or gender identity.            Thank you!     Thank you for choosing [facility]  for your care. Our goal is always to provide you with excellent care. Hearing back from our patients is one way we can continue to improve our services. Please take a few minutes to complete the written survey that you may receive in the mail after your visit with us. Thank you!             Your Updated Medication List - Protect others around you: Learn how to safely use, store and throw away " your medicines at www.disposemymeds.org.          This list is accurate as of: 7/26/17 11:59 PM.  Always use your most recent med list.                   Brand Name Dispense Instructions for use Diagnosis    fluticasone 50 MCG/ACT spray    FLONASE    1 Bottle    Spray 1 spray into both nostrils daily    Chronic nonseasonal allergic rhinitis due to other allergen

## 2017-07-26 NOTE — PROGRESS NOTES
HPI      ROS (Review of Systems):      Positive for 10-Point Review.  10-Point Review has been done  All other systems reviewed and are negative.        Physical Exam

## 2017-07-26 NOTE — LETTER
2017      RE:  Latesha Ruiz-:  58    Latesha Ruiz is presenting at the request of Dr. Anaya for hemorrhoids. The patient has a colonoscopy upcoming which is scheduled with me. She has been having anal pain. Initially this is much more severe but now has been tapering off. She denies any significant bleeding or any prolapsing tissue. Her medical history is reviewed. On exam, there is a resolving thrombosed external hemorrhoid as well as small internal hemorrhoids. We discussed this. As far as the thrombosed external hemorrhoid considering the length of time that this has been there for do not incising it will be any benefit. I did discuss with her that if this happens again I would like her to call me quickly so that I could drain it in the clinic and hopefully speed her recovery. This by itself does not call for the need for hemorrhoid surgery. She does have smaller internal hemorrhoids but these have not been symptomatic with bleeding or prolapse and so I do not think that treatment here is mandatory. Treatment for them would involve banding however the external hemorrhoid cannot be treated in this fashion because this would be no different pain than crushing it for 3 days until it necroses and therefore banding is not indicated for external disease. I recommend that she proceed with colonoscopy and should only consider banding at the same time if she is having symptoms clearly referable to the internal hemorrhoids. For the external hemorrhoids I would like her to try to decrease straining as well as to maintain a 30 g per day fiber intake. 30 minutes were spent in this encounter, over 50% in counseling and coordination of care.      Mario Leyva MD  [phone] (c)  [phone] (p)      This note was created using voice recognition software. Undetected word substitutions or other errors may have occurred.

## 2017-07-30 ENCOUNTER — RADIANT APPOINTMENT (OUTPATIENT)
Dept: GENERAL RADIOLOGY | Facility: CLINIC | Age: 57
End: 2017-07-30
Attending: FAMILY MEDICINE
Payer: COMMERCIAL

## 2017-07-30 ENCOUNTER — OFFICE VISIT (OUTPATIENT)
Dept: URGENT CARE | Facility: URGENT CARE | Age: 57
End: 2017-07-30
Payer: COMMERCIAL

## 2017-07-30 VITALS
HEART RATE: 67 BPM | DIASTOLIC BLOOD PRESSURE: 50 MMHG | WEIGHT: 160.1 LBS | SYSTOLIC BLOOD PRESSURE: 90 MMHG | TEMPERATURE: 98.3 F | OXYGEN SATURATION: 98 % | BODY MASS INDEX: 23.64 KG/M2

## 2017-07-30 DIAGNOSIS — M25.461 EFFUSION OF RIGHT KNEE: ICD-10-CM

## 2017-07-30 DIAGNOSIS — M25.561 ACUTE PAIN OF RIGHT KNEE: ICD-10-CM

## 2017-07-30 DIAGNOSIS — M25.561 ACUTE PAIN OF RIGHT KNEE: Primary | ICD-10-CM

## 2017-07-30 PROCEDURE — 99214 OFFICE O/P EST MOD 30 MIN: CPT | Performed by: FAMILY MEDICINE

## 2017-07-30 PROCEDURE — 73562 X-RAY EXAM OF KNEE 3: CPT | Mod: RT

## 2017-07-30 NOTE — PATIENT INSTRUCTIONS
Rest, ice, elevation, ace wrap/compression to area.  Okay to take ibuprofen 200 mg - 4 tablets (800 mg) every 8 hours as needed.  Okay to take tylenol 500 mg - 2 tablets (1000 mg) every 6-8 hours as needed, do not exceed 3000 mg in 24 hours.      Understanding Baker s Cyst (Popliteal Cyst)  A Baker s cyst (popliteal cyst) is a fluid-filled sac that forms behind the knee.  Parts of the knee  The knee is a complex joint that has many parts. The lower end of the thighbone (femur) rotates on the upper end of the shinbone (tibia). There are several small bursae around the knee joint. These are small sacs filled with a special fluid (synovial fluid) that cushions the rest of the joint. Between the bones is a space that also contains this fluid.  What causes a Baker s cyst?  A small bursa sits just below the crease at the back of your knee. When this sac fills with too much fluid, it s called a Baker s cyst. This might happen when an injury or disease causes extra synovial fluid to leak into the bursa from the joint space.  In adults, other problems with the knee joint often cause the Baker s cyst. Injury or a knee disorder can change the normal structure of the knee joint. This can cause a cyst to form.  The synovial fluid inside the joint space may build up as a result of injury or disease. As the pressure builds up, the fluid may bulge into the back of the knee. This can cause the cyst.  Symptoms of a Baker s cyst  A Baker s cyst often doesn t cause symptoms. A cyst will more often be seen on an imaging test, like MRI, done for other reasons. If you do have symptoms, they may include:    Pain in the back of the knee    Knee stiffness    Sense of swelling or fullness behind the knee, especially when you straighten your leg    A swelling behind the knee that goes away when you bend your knee  These symptoms tend to get worse when standing for a long time, or being active.  Diagnosing a Baker s cyst  Your healthcare  provider will ask you about your medical history and your symptoms. He or she will give you a physical exam, which will include a careful exam of your knee. It s important to make sure your symptoms are caused by a Baker s cyst and not a tumor or a blood clot.  If the cause of your symptoms is not clear, you may have imaging tests, such as:    Ultrasound, to look at the cyst in more detail    X-ray, to get more information about the bones of the joint    MRI, if the diagnosis is still unclear after ultrasound, or if your health care provider is considering surgery  Date Last Reviewed: 3/3/2015    1138-7647 [employer]. [address] [place], [place] [id number]. All rights reserved. This information is not intended as a substitute for professional medical care. Always follow your healthcare professional's instructions.        Treatment for Baker s Cyst (Popliteal Cyst)  A Baker s cyst (popliteal cyst) is a fluid-filled sac that forms behind the knee.  Types of treatment  You likely won t need any treatment if you don t have any symptoms from your Baker s cyst. Some Baker s cysts go away without any treatment. If your cyst starts causing symptoms, you might need treatment at that time.  If you do have symptoms, you may be treated depending on the cause of your cyst. For example, you may need medicine for rheumatoid arthritis. Or you may need physical therapy for osteoarthritis.  Other treatments for a Baker s cyst can include:    Over-the-counter pain medicines    Arthrocentesis to remove extra fluid from the joint space    Steroid injection into the joint to reduce cyst size    Surgery to remove the cyst  Possible complications of a Baker s cyst  In rare cases, a Baker s cyst may cause complications. The cyst may get larger, which may cause redness and swelling. The cyst may also rupture, causing warmth, redness, and pain in your calf.  The symptoms may be the same as a blood clot in the veins of the  legs. Your health care provider may need imaging tests of your leg to make sure you don t have a clot. Rupture can also lead to its own complications, such as:    Trapping of a tibial nerve. This cases calf pain and numbness behind the leg. It can be treated with arthrocentesis and steroid injections.    Blockage of the popliteal artery. This causes pain and lack of blood flow to the leg. It can also be treated with arthrocentesis and steroid injections.    Compartment syndrome. This causes intense pain and problems moving the foot or toes. Compartment syndrome is a medical emergency. It needs immediate surgery. It can lead to permanent muscle damage if not treated right away.  When to call the health care provider  If your cyst starts causing mild symptoms, plan to see your health care provider soon. See him or her right away if you have symptoms such as redness and swelling of your leg. These symptoms may mean your Baker s cyst has ruptured.      Date Last Reviewed: 7/21/2015 2000-2017 [employer]. [address] [place] [address]. All rights reserved. This information is not intended as a substitute for professional medical care. Always follow your healthcare professional's instructions.        Water on the Knee    Water on the knee is also known as knee effusion. The knee joint normally has less than 1 ounce of fluid. Injury or inflammation of the knee joint causes extra fluid to collect there. When this happens, the knee joint looks swollen and is usually painful. It may be hard to fully bend the knee.  The most common cause of water on the knee is osteoarthritis due to wear and tear on the joint cartilage. Other causes include injury to the cartilage, inflammatory arthritis such as gout or rheumatoid arthritis, and infection of the joint.  You may need a needle aspiration, if the cause of your water on the knee is not certain. This procedure removes a sample of joint fluid from the  knee for testing. This is done with a local anesthetic. Removing excess fluid may also relieve swelling and pain.  Home care    Limit your activities. Stay off the injured leg as much as possible until pain improves.    Keep your leg elevated to reduce pain and swelling. When sleeping, place a pillow under the injured leg. When sitting, support the injured leg so it is level with your waist. This is very important during the first 48 hours.    Apply an ice pack over the injured area for 15 to 20 minutes every 3 to 6 hours. You should do this for the first 24 to 48 hours. You can make an ice pack by filling a plastic bag that seals at the top with ice cubes and then wrapping it with a thin towel. Continue to use ice packs for relief of pain and swelling as needed. As the ice melts, be careful to avoid getting your wrap, splint, or cast wet. After 48 hours, apply heat(warm shower or warm bath) for 15 to 20 minutes several times a day, or alternate ice and heat. If you have to wear a hook-and-loop knee brace, you can open it to apply the ice pack, or heat, directly to the knee. Never put ice directly on the skin. Always wrap the ice in a towel or other type of cloth.    You may use over-the-counter pain medicine to control pain, unless another pain medicine was prescribed. If you have chronic liver or kidney disease or have ever had a stomach ulcer or GI bleeding, talk with your healthcare provider before using these medicines.    If crutches or a walker have been recommended, do not put weight on the injured leg until you can do so without pain. Check with your healthcare provider before returning to sports or full work duties.    If you have a hook-and-loop knee brace, you can remove it to bathe and sleep, unless told otherwise.  Follow-up care  Follow up with your healthcare provider as advised.  If you are overweight, talk to your healthcare provider about a weight loss program. The excess weight puts extra strain  on your knees.  When to seek medical advice  Call your healthcare provider right away if any of these occur:    Increasing pain, redness, or swelling of the knee    Fever of 100.4 F (38 C) or above lasting for 24 to 48 hours  Date Last Reviewed: 11/23/2015 2000-2017 [employer]. [address] [place] [address], [place] [id number]. All rights reserved. This information is not intended as a substitute for professional medical care. Always follow your healthcare professional's instructions.

## 2017-07-30 NOTE — PROGRESS NOTES
SUBJECTIVE:  Chief Complaint   Patient presents with     Swelling     states swelling in Rt knee since wednesday, no known injury, no bruising.     Latesha Ruiz is a 57 year old female who presents with a chief complaint of right knee swelling.  Symptoms began 4 day(s) ago, are moderate and sudden onset  Context:  Injury:No. Patient states that was golfing about 1 month ago and felt knee was more stiff and tight but this resolved within a couple of days.  Patient denies any acute trauma, no problems with knee before.  Patient states that driving, steps will worsen symptoms.  Patient denies history of gout.  Denies any redness.  Swelling has persisted but has improved a little since this started.    Past Medical History:   Diagnosis Date     AK (actinic keratosis) 6/17/2013     Allergic rhinitis, cause unspecified      Closed fracture of metacarpal bone(s), site unspecified 12/04    Right 5th metacarpal fx--caught in a door     Closed fracture of rib(s), unspecified ~1995     Contact dermatitis and other eczema, due to unspecified cause      Disorder of bone and cartilage, unspecified ~4/2000    T-score -2.00 in L-spine in 5/03     Lumbago      Premature menopause     Elevated FSH/LH in 1993--normal brain MRI 12/2001     Current Outpatient Prescriptions   Medication Sig Dispense Refill     fluticasone (FLONASE) 50 MCG/ACT spray Spray 1 spray into both nostrils daily 1 Bottle 11     Social History   Substance Use Topics     Smoking status: Never Smoker     Smokeless tobacco: Never Used     Alcohol use No       ROS:  CONSTITUTIONAL:NEGATIVE for fever, chills, change in weight  INTEGUMENTARY/SKIN: NEGATIVE for worrisome rashes, moles or lesions  ENT/MOUTH: NEGATIVE for ear, mouth and throat problems  RESP:NEGATIVE for significant cough or SOB  CV: NEGATIVE for chest pain, palpitations or peripheral edema  GI: NEGATIVE for nausea, abdominal pain, heartburn, or change in bowel habits  MUSCULOSKELETAL: POSITIVE  for  joint pain and joint swelling     EXAM:   BP 90/50  Pulse 67  Temp 98.3  F (36.8  C) (Tympanic)  Wt 160 lb 1.6 oz (72.6 kg)  SpO2 98%  BMI 23.64 kg/m2  M/S Exam:right knee with swelling, tenderness to palpation in medial collateral and decreased ROM, no crepitus, no redness  GENERAL APPEARANCE: healthy, alert and no distress  EXTREMITIES: peripheral pulses normal  SKIN: no suspicious lesions or rashes  NEURO: Normal strength and tone, sensory exam grossly normal, mentation intact and speech normal    X-RAY was done - right knee - no acute fracture    ASSESSMENT/PLAN:  (M25.561) Acute pain of right knee  (primary encounter diagnosis)  Plan: XR Knee Right 3 Views, ORTHO          REFERRAL, order for DME            (M25.461) Effusion of right knee  Plan: order for DME            Reviewed etiology for knee pain and effusion, possible popliteal cyst.  Encourage rest, ice, elevation, DME for knee sleeve for comfort and support.  Referral to [facility] for further evaluation and recommendation.    Brody James MD

## 2017-07-30 NOTE — MR AVS SNAPSHOT
After Visit Summary   7/30/2017    Latesha Ruiz    MRN: [id number]           Patient Information     Date Of Birth          1960        Visit Information        Provider Department      7/30/2017 3:55 PM Brody James MD [facility]        Today's Diagnoses     Acute pain of right knee    -  1      Care Instructions      Rest, ice, elevation, ace wrap/compression to area.  Okay to take ibuprofen 200 mg - 4 tablets (800 mg) every 8 hours as needed.  Okay to take tylenol 500 mg - 2 tablets (1000 mg) every 6-8 hours as needed, do not exceed 3000 mg in 24 hours.      Understanding Baker s Cyst (Popliteal Cyst)  A Baker s cyst (popliteal cyst) is a fluid-filled sac that forms behind the knee.  Parts of the knee  The knee is a complex joint that has many parts. The lower end of the thighbone (femur) rotates on the upper end of the shinbone (tibia). There are several small bursae around the knee joint. These are small sacs filled with a special fluid (synovial fluid) that cushions the rest of the joint. Between the bones is a space that also contains this fluid.  What causes a Baker s cyst?  A small bursa sits just below the crease at the back of your knee. When this sac fills with too much fluid, it s called a Baker s cyst. This might happen when an injury or disease causes extra synovial fluid to leak into the bursa from the joint space.  In adults, other problems with the knee joint often cause the Baker s cyst. Injury or a knee disorder can change the normal structure of the knee joint. This can cause a cyst to form.  The synovial fluid inside the joint space may build up as a result of injury or disease. As the pressure builds up, the fluid may bulge into the back of the knee. This can cause the cyst.  Symptoms of a Baker s cyst  A Baker s cyst often doesn t cause symptoms. A cyst will more often be seen on an imaging test, like MRI, done for other reasons. If you do have  symptoms, they may include:    Pain in the back of the knee    Knee stiffness    Sense of swelling or fullness behind the knee, especially when you straighten your leg    A swelling behind the knee that goes away when you bend your knee  These symptoms tend to get worse when standing for a long time, or being active.  Diagnosing a Baker s cyst  Your healthcare provider will ask you about your medical history and your symptoms. He or she will give you a physical exam, which will include a careful exam of your knee. It s important to make sure your symptoms are caused by a Baker s cyst and not a tumor or a blood clot.  If the cause of your symptoms is not clear, you may have imaging tests, such as:    Ultrasound, to look at the cyst in more detail    X-ray, to get more information about the bones of the joint    MRI, if the diagnosis is still unclear after ultrasound, or if your health care provider is considering surgery  Date Last Reviewed: 3/3/2015    5085-1604 [employer]. [address] [place] [address]. All rights reserved. This information is not intended as a substitute for professional medical care. Always follow your healthcare professional's instructions.        Treatment for Baker s Cyst (Popliteal Cyst)  A Baker s cyst (popliteal cyst) is a fluid-filled sac that forms behind the knee.  Types of treatment  You likely won t need any treatment if you don t have any symptoms from your Baker s cyst. Some Baker s cysts go away without any treatment. If your cyst starts causing symptoms, you might need treatment at that time.  If you do have symptoms, you may be treated depending on the cause of your cyst. For example, you may need medicine for rheumatoid arthritis. Or you may need physical therapy for osteoarthritis.  Other treatments for a Baker s cyst can include:    Over-the-counter pain medicines    Arthrocentesis to remove extra fluid from the joint space    Steroid injection into  the joint to reduce cyst size    Surgery to remove the cyst  Possible complications of a Baker s cyst  In rare cases, a Baker s cyst may cause complications. The cyst may get larger, which may cause redness and swelling. The cyst may also rupture, causing warmth, redness, and pain in your calf.  The symptoms may be the same as a blood clot in the veins of the legs. Your health care provider may need imaging tests of your leg to make sure you don t have a clot. Rupture can also lead to its own complications, such as:    Trapping of a tibial nerve. This cases calf pain and numbness behind the leg. It can be treated with arthrocentesis and steroid injections.    Blockage of the popliteal artery. This causes pain and lack of blood flow to the leg. It can also be treated with arthrocentesis and steroid injections.    Compartment syndrome. This causes intense pain and problems moving the foot or toes. Compartment syndrome is a medical emergency. It needs immediate surgery. It can lead to permanent muscle damage if not treated right away.  When to call the health care provider  If your cyst starts causing mild symptoms, plan to see your health care provider soon. See him or her right away if you have symptoms such as redness and swelling of your leg. These symptoms may mean your Baker s cyst has ruptured.      Date Last Reviewed: 7/21/2015 2000-2017 [employer]. [address] [place] [address], [place] [address]. All rights reserved. This information is not intended as a substitute for professional medical care. Always follow your healthcare professional's instructions.        Water on the Knee    Water on the knee is also known as knee effusion. The knee joint normally has less than 1 ounce of fluid. Injury or inflammation of the knee joint causes extra fluid to collect there. When this happens, the knee joint looks swollen and is usually painful. It may be hard to fully bend the knee.  The most common cause of  water on the knee is osteoarthritis due to wear and tear on the joint cartilage. Other causes include injury to the cartilage, inflammatory arthritis such as gout or rheumatoid arthritis, and infection of the joint.  You may need a needle aspiration, if the cause of your water on the knee is not certain. This procedure removes a sample of joint fluid from the knee for testing. This is done with a local anesthetic. Removing excess fluid may also relieve swelling and pain.  Home care    Limit your activities. Stay off the injured leg as much as possible until pain improves.    Keep your leg elevated to reduce pain and swelling. When sleeping, place a pillow under the injured leg. When sitting, support the injured leg so it is level with your waist. This is very important during the first 48 hours.    Apply an ice pack over the injured area for 15 to 20 minutes every 3 to 6 hours. You should do this for the first 24 to 48 hours. You can make an ice pack by filling a plastic bag that seals at the top with ice cubes and then wrapping it with a thin towel. Continue to use ice packs for relief of pain and swelling as needed. As the ice melts, be careful to avoid getting your wrap, splint, or cast wet. After 48 hours, apply heat(warm shower or warm bath) for 15 to 20 minutes several times a day, or alternate ice and heat. If you have to wear a hook-and-loop knee brace, you can open it to apply the ice pack, or heat, directly to the knee. Never put ice directly on the skin. Always wrap the ice in a towel or other type of cloth.    You may use over-the-counter pain medicine to control pain, unless another pain medicine was prescribed. If you have chronic liver or kidney disease or have ever had a stomach ulcer or GI bleeding, talk with your healthcare provider before using these medicines.    If crutches or a walker have been recommended, do not put weight on the injured leg until you can do so without pain. Check with your  healthcare provider before returning to sports or full work duties.    If you have a hook-and-loop knee brace, you can remove it to bathe and sleep, unless told otherwise.  Follow-up care  Follow up with your healthcare provider as advised.  If you are overweight, talk to your healthcare provider about a weight loss program. The excess weight puts extra strain on your knees.  When to seek medical advice  Call your healthcare provider right away if any of these occur:    Increasing pain, redness, or swelling of the knee    Fever of 100.4 F (38 C) or above lasting for 24 to 48 hours  Date Last Reviewed: 11/23/2015 2000-2017 [employer]. [address] [place] [address], [place] [address]. All rights reserved. This information is not intended as a substitute for professional medical care. Always follow your healthcare professional's instructions.                Follow-ups after your visit        Additional Services     ORTHO  REFERRAL       [facility] is referring you to the Orthopedic  Services at [facility] [place].       The  Representative will assist you in the coordination of your Orthopedic and Musculoskeletal Care as prescribed by your physician.    The  Representative will call you within 1 business day to help schedule your appointment, or you may contact the  Representative at:    All areas ~ [phone]     Type of Referral : Non Surgical       Timeframe requested: 3 - 5 days    Coverage of these services is subject to the terms and limitations of your health insurance plan.  Please call member services at your health plan with any benefit or coverage questions.      If X-rays, CT or MRI's have been performed, please contact the facility where they were done to arrange for , prior to your scheduled appointment.  Please bring this referral request to your appointment and present it to your specialist.                   Your next 10 appointments already scheduled     Aug 03, 2017   Procedure with Mario Leyva MD   [facility] ([facility])    [address]  [facility] [address] [phone]           [facility] is located at [address] [place]            Aug 03, 2017 12:45 PM CDT   [facility] with Mario Leyva MD   [facility]    [facility])   [phone]              Who to contact     If you have questions or need follow up information about today's clinic visit or your schedule please contact [facility] directly at [phone].  Normal or non-critical lab and imaging results will be communicated to you by MyChart, letter or phone within 4 business days after the clinic has received the results. If you do not hear from us within 7 days, please contact the clinic through [employer] or phone. If you have a critical or abnormal lab result, we will notify you by phone as soon as possible.  Submit refill requests through [employer] or call your pharmacy and they will forward the refill request to us. Please allow 3 business days for your refill to be completed.          Additional Information About Your Visit        [employer] Information     [employer] gives you secure access to your electronic health record. If you see a primary care provider, you can also send messages to your care team and make appointments. If you have questions, please call your primary care clinic.  If you do not have a primary care provider, please call [phone] and they will assist you.        Care EveryWhere ID     This is your Care EveryWhere ID. This could be used by other organizations to access your [facility] medical records  [id number]        Your Vitals Were     Pulse Temperature Pulse Oximetry BMI (Body Mass Index)          67  98.3  F (36.8  C) (Tympanic) 98% 23.64 kg/m2         Blood Pressure from Last 3 Encounters:   07/30/17 90/50   07/26/17 92/60   07/14/17 100/62    Weight from Last 3 Encounters:   07/30/17 160 lb 1.6 oz (72.6 kg)   07/26/17 157 lb (71.2 kg)   07/14/17 158 lb 14.4 oz (72.1 kg)              We Performed the Following     ORTHO  REFERRAL        Primary Care Provider Office Phone # Fax #    Maday Anaya [phone]       [facility] [address] [place] [address]        Equal Access to Services     [facility]: Hadii aad tatiana hadasho Sopablito, waaxda luqadaha, qaybta kaalmada adeegyada, guillaume menjivar . So [facility] [phone].    ATENCIÓN: Si habla español, tiene a martinez disposición servicios gratuitos de asistencia lingüística. Llame al [phone].    We comply with applicable federal civil rights laws and [place] laws. We do not discriminate on the basis of race, color, national origin, age, disability sex, sexual orientation or gender identity.            Thank you!     Thank you for choosing [facility]  for your care. Our goal is always to provide you with excellent care. Hearing back from our patients is one way we can continue to improve our services. Please take a few minutes to complete the written survey that you may receive in the mail after your visit with us. Thank you!             Your Updated Medication List - Protect others around you: Learn how to safely use, store and throw away your medicines at www.disposemymeds.org.          This list is accurate as of: 7/30/17  5:35 PM.  Always use your most recent med list.                   Brand Name Dispense Instructions for use Diagnosis    fluticasone 50 MCG/ACT spray    FLONASE    1 Bottle    Spray 1 spray into both nostrils daily    Chronic nonseasonal allergic rhinitis due to other allergen

## 2017-08-01 ENCOUNTER — TRANSFERRED RECORDS (OUTPATIENT)
Dept: HEALTH INFORMATION MANAGEMENT | Facility: CLINIC | Age: 57
End: 2017-08-01

## 2017-08-01 NOTE — PROGRESS NOTES
Latesha Ruiz is presenting at the request of Dr. Anaya for hemorrhoids. The patient has a colonoscopy upcoming which is scheduled with me. She has been having anal pain. Initially this is much more severe but now has been tapering off. She denies any significant bleeding or any prolapsing tissue. Her medical history is reviewed. On exam, there is a resolving thrombosed external hemorrhoid as well as small internal hemorrhoids. We discussed this. As far as the thrombosed external hemorrhoid considering the length of time that this has been there for do not incising it will be any benefit. I did discuss with her that if this happens again I would like her to call me quickly so that I could drain it in the clinic and hopefully speed her recovery. This by itself does not call for the need for hemorrhoid surgery. She does have smaller internal hemorrhoids but these have not been symptomatic with bleeding or prolapse and so I do not think that treatment here is mandatory. Treatment for them would involve banding however the external hemorrhoid cannot be treated in this fashion because this would be no different pain than crushing it for 3 days until it necroses and therefore banding is not indicated for external disease. I recommend that she proceed with colonoscopy and should only consider banding at the same time if she is having symptoms clearly referable to the internal hemorrhoids. For the external hemorrhoids I would like her to try to decrease straining as well as to maintain a 30 g per day fiber intake. 30 minutes were spent in this encounter, over 50% in counseling and coordination of care.     Mario Leyva MD  [phone] (c)  [phone] (p)     This note was created using voice recognition software. Undetected word substitutions or other errors may have occurred.

## 2017-08-03 ENCOUNTER — APPOINTMENT (OUTPATIENT)
Dept: SURGERY | Facility: PHYSICIAN GROUP | Age: 57
End: 2017-08-03
Payer: COMMERCIAL

## 2017-08-03 ENCOUNTER — HOSPITAL ENCOUNTER (OUTPATIENT)
Facility: CLINIC | Age: 57
Discharge: HOME | End: 2017-08-03
Attending: SURGERY | Admitting: SURGERY
Payer: COMMERCIAL

## 2017-08-03 VITALS
OXYGEN SATURATION: 98 % | DIASTOLIC BLOOD PRESSURE: 57 MMHG | SYSTOLIC BLOOD PRESSURE: 99 MMHG | RESPIRATION RATE: 12 BRPM

## 2017-08-03 LAB — COLONOSCOPY: NORMAL

## 2017-08-03 PROCEDURE — 99152 MOD SED SAME PHYS/QHP 5/>YRS: CPT | Mod: PT | Performed by: SURGERY

## 2017-08-03 PROCEDURE — 25000128 H RX IP 250 OP 636: Performed by: SURGERY

## 2017-08-03 PROCEDURE — 99153 MOD SED SAME PHYS/QHP EA: CPT | Mod: PT | Performed by: SURGERY

## 2017-08-03 PROCEDURE — G0500 MOD SEDAT ENDO SERVICE >5YRS: HCPCS | Performed by: SURGERY

## 2017-08-03 PROCEDURE — 99153 MOD SED SAME PHYS/QHP EA: CPT | Performed by: SURGERY

## 2017-08-03 PROCEDURE — 88305 TISSUE EXAM BY PATHOLOGIST: CPT | Mod: 26 | Performed by: SURGERY

## 2017-08-03 PROCEDURE — 45385 COLONOSCOPY W/LESION REMOVAL: CPT | Mod: PT | Performed by: SURGERY

## 2017-08-03 PROCEDURE — 88305 TISSUE EXAM BY PATHOLOGIST: CPT | Performed by: SURGERY

## 2017-08-03 RX ORDER — FENTANYL CITRATE 50 UG/ML
INJECTION, SOLUTION INTRAMUSCULAR; INTRAVENOUS PRN
Status: DISCONTINUED | OUTPATIENT
Start: 2017-08-03 | End: 2017-08-03 | Stop reason: HOSPADM

## 2017-08-03 RX ORDER — ONDANSETRON 4 MG/1
4 TABLET, ORALLY DISINTEGRATING ORAL EVERY 6 HOURS PRN
Status: DISCONTINUED | OUTPATIENT
Start: 2017-08-03 | End: 2017-08-03 | Stop reason: HOSPADM

## 2017-08-03 RX ORDER — LIDOCAINE 40 MG/G
CREAM TOPICAL
Status: DISCONTINUED | OUTPATIENT
Start: 2017-08-03 | End: 2017-08-03 | Stop reason: HOSPADM

## 2017-08-03 RX ORDER — ONDANSETRON 2 MG/ML
4 INJECTION INTRAMUSCULAR; INTRAVENOUS EVERY 6 HOURS PRN
Status: DISCONTINUED | OUTPATIENT
Start: 2017-08-03 | End: 2017-08-03 | Stop reason: HOSPADM

## 2017-08-03 RX ORDER — FLUMAZENIL 0.1 MG/ML
0.2 INJECTION, SOLUTION INTRAVENOUS
Status: DISCONTINUED | OUTPATIENT
Start: 2017-08-03 | End: 2017-08-03 | Stop reason: HOSPADM

## 2017-08-03 RX ORDER — NALOXONE HYDROCHLORIDE 0.4 MG/ML
.1-.4 INJECTION, SOLUTION INTRAMUSCULAR; INTRAVENOUS; SUBCUTANEOUS
Status: DISCONTINUED | OUTPATIENT
Start: 2017-08-03 | End: 2017-08-03 | Stop reason: HOSPADM

## 2017-08-03 RX ORDER — ONDANSETRON 2 MG/ML
4 INJECTION INTRAMUSCULAR; INTRAVENOUS
Status: DISCONTINUED | OUTPATIENT
Start: 2017-08-03 | End: 2017-08-03 | Stop reason: HOSPADM

## 2017-08-03 NOTE — IP AVS SNAPSHOT
[facility]    [address] [facility] [address]    Phone:  [phone]    Fax:  [phone]                                       After Visit Summary   8/3/2017    Latesha Ruiz    MRN: [id number]           After Visit Summary Signature Page     I have received my discharge instructions, and my questions have been answered. I have discussed any challenges I see with this plan with the nurse or doctor.    ..........................................................................................................................................  Patient/Patient Representative Signature      ..........................................................................................................................................  Patient Representative Print Name and Relationship to Patient    ..................................................               ................................................  Date                                            Time    ..........................................................................................................................................  Reviewed by Signature/Title    ...................................................              ..............................................  Date                                                            Time

## 2017-08-03 NOTE — DISCHARGE INSTRUCTIONS
The patient has received a copy of the Provation  report the doctor has written and discharge instructions have been discussed with the patient and responsible adult.  All questions were addressed and answered prior to patient discharge.        Understanding Colon and Rectal Polyps     The colon has a smooth lining composed of millions of cells.     The colon (also called the large intestine) is a muscular tube that forms the last part of the digestive tract. It absorbs water and stores food waste. The colon is about 4 to 6 feet long. The rectum is the last 6 inches of the colon. The colon and rectum have a smooth lining composed of millions of cells. Changes in these cells can lead to growths in the colon that can become cancerous and should be removed.     When the Colon Lining Changes  Changes that occur in the cells that line the colon or rectum can lead to growths called polyps. Over a period of years, polyps can turn cancerous. Removing polyps early may prevent cancer from ever forming.      Polyps  Polyps are fleshy clumps of tissue that form on the lining of the colon or rectum. Small polyps are usually benign (not cancerous). However, over time, cells in a polyp can change and become cancerous. The larger a polyp grows, the more likely this is to happen. Also, certain types of polyps known as adenomatous polyps are considered premalignant. This means that they will almost always become cancerous if they re not removed.          Cancer  Almost all colorectal cancers start when polyp cells begin growing abnormally. As a cancerous tumor grows, it may involve more and more of the colon or rectum. In time, cancer can also grow beyond the colon or rectum and spread to nearby organs or to glands called lymph nodes. The cells can also travel to other parts of the body. This is known as metastasis. The earlier a cancerous tumor is removed, the better the chance of preventing its spread.        3174-1253 [employer], [address] [place] [address], [place] [address]. All rights reserved. This information is not intended as a substitute for professional medical care. Always follow your healthcare professional's instructions.      Eating a High-Fiber Diet  Fiber is what gives strength and structure to plants. Most grains, beans, vegetables, and fruits contain fiber. Foods rich in fiber are often low in calories and fat, and they fill you up more. They may also reduce your risks for certain health problems. To find out the amount of fiber in canned, packaged, or frozen foods, read the  Nutrition Facts  label. It tells you how much fiber is in a serving.      Types of Fiber and Their Benefits  There are two types of fiber: insoluble and soluble. They both aid digestion and help you maintain a healthy weight.  Insoluble fiber: This is found in whole grains, cereals, certain fruits and vegetables (such as apple skin, corn, and carrots). Insoluble fiber may prevent constipation and reduce the risk of certain types of cancer.   Soluble fiber: This type of fiber is in oats, beans, and certain fruits and vegetables (such as strawberries and peas). Soluble fiber can reduce cholesterol (which may help lower the risk of heart disease), and helps control blood sugar levels.  Look for High-Fiber Foods  Whole-grain breads and cereals: Try to eat 6-8 ounces a day. Include wheat and oat bran cereals, whole-wheat muffins or toast, and corn tortillas in your meals.  Fruits: Try to eat 2 cups a day. Apples, oranges, strawberries, pears, and bananas are good sources. (Note: Fruit juice is low in fiber.)  Vegetables: Try to eat 3 cups a day. Add asparagus, carrots, broccoli, peas, and corn to your meals.  Legumes (beans): One cup of cooked lentils gives you over 15 grams of fiber. Try navy beans, lentils, and chickpeas.  Seeds:  A small handful of seeds gives you about 3 grams of fiber. Try sunflower seeds.    Keep Track of Your Fiber  A healthy  diet includes 31 grams of fiber a day if you have a 2,000-calorie diet. Keep track of how much fiber you eat. Start by reading food labels. Then eat a variety of foods high in fiber. Ask your doctor about supplemental fiber products.            5572-1921 [employer], [address] [place] [address], [place] [address]. All rights reserved. This information is not intended as a substitute for professional medical care. Always follow your healthcare professional's instructions.      HIGH FIBER DIET  Fiber is present in all fruits, vegetables, cereals and grains. Fiber passes through the body undigested. A high fiber diet helps food move through the intestinal tract. The added bulk is helpful in preventing constipation. In people with diverticulosis it serves to clean out the pouches along the colon wall while preventing new ones from forming. A high fiber diet also reduces the risk of colon cancer, decreases blood cholesterol and prevents high blood sugar in people with diabetes.    The foods listed below are high in fiber and should be included in your diet. If you are not used to high fiber foods, start with 1 or 2 foods from this list. Every 3-4 days add a new one to your diet until you are eating 4 high fiber foods per day. This should give you 20-35 Gm of fiber/day. It is also important to drink a lot of water when you are on this diet (6-8 glasses a day). Water causes the fiber to swell and increases the benefit.    FOODS HIGH IN DIETARY FIBER:  BREADS: Made with 100% whole wheat flour; monica, wheat or rye crackers; tortillas, bran muffins  CEREALS: Whole grain cereal with bran (Chex, Raisin Bran, Corn Bran), oatmeal, rolled oats, granola, wheat flakes, brown rice  NUTS: Any nuts  FRUITS: All fresh fruits along with edible skins, (bananas, citrus fruit, mangoes, pears, prunes, raisins, apples, pineapple, apricot, melon, jams and marmalades), fruit juices (especially prune juice)  VEGETABLES: All types, preferably raw  or lightly cooked: especially, celery, eggplant, potatoes, spinach, broccoli, brussel sprouts, winter squash, carrots, cauliflower, soybeans, lentils, fresh and dried beans of all kinds  OTHER: Popcorn, any spices      0653-5911 [employer], [address] [place] [address], [place] [address]. All rights reserved. This information is not intended as a substitute for professional medical care. Always follow your healthcare professional's instructions.

## 2017-08-03 NOTE — LETTER
July 17, 2017      Latesha Ruiz  [address] [facility] [address]              Dear Latesha Ruiz,    Thank you for choosing [facility]. You are scheduled for the following service.     Date:  7/27/2017 Thursday             Procedure:  COLONOSCOPY  Doctor:        Dr. Leyva   Arrival Time:  10:45 AM  *check in at Emergency/Endoscopy desk*  Procedure Time:  11:00 AM   Location:   [facility]        Endoscopy Department, First Floor (Enter through ER Doors) *        [address] [place], [place] [address]      [phone] or [phone] () to reschedule        Colonoscopy is the most accurate test to detect colon polyps and colon cancer; and the only test where polyps can be removed. During this procedure, a doctor examines the lining of your large intestine and rectum through a flexible tube.           Transportation  Arrange for a ride for the day of your procedure with a responsible adult.  A taxi ride is not an option unless you are accompanied by a responsible adult. If you fail to arrange transportation with a responsible adult, your procedure will be cancelled and rescheduled.    MIRALAX -GATORADE  PREP    Purchase the following supplies at your local pharmacy:  - 2 (two) bisacodyl tablets: each tablet contains 5 mg.  (Dulcolax  laxative NOT Dulcolax  stool softener)   - 1 (one) 8.3 oz bottle of Polyethylene Glycol (PEG) 3350 Powder   (MiraLAX , Smooth LAX , ClearLAX  or equivalent)  - 64 oz Gatorade    Regular Gatorade, Gatorade G2 , Powerade , Powerade Zero  or Pedialyte  is acceptable. Red colored flavors are not allowed; all other colors (yellow, green, orange, purple and blue) are okay. It is also okay to buy two 2.12 oz packets of powdered Gatorade that can be mixed with water to a total volume of 64 oz of liquid.  - 1 (one) 10 oz bottle of Magnesium Citrate (Red colored flavors are not allowed)  It is also okay for  you to use a 0.5 oz package of powdered magnesium citrate (17 g) mixed with 10 oz of water.      PREPARATION FOR COLONOSCOPY    7 days before:    Discontinue fiber supplements and medications containing iron. This includes Metamucil  and Fibercon ; and multivitamins with iron.  3 days before:    Begin a low-fiber diet. A low-fiber diet helps making the cleanout more effective.     Examples of a low-fiber diet include (but are not limited to): white bread, white rice, pasta, crackers, fish, chicken, eggs, ground beef, creamy peanut butter, cooked/steamed/boiled vegetables, canned fruit, bananas, melons, milk, plain yogurt cheese, salad dressing and other condiments.     The following are not allowed on a low-fiber diet: seeds, nuts, popcorn, bran, whole wheat, corn, quinoa, raw fruits and vegetables, berries and dried fruit, beans and lentils.    For additional details on low-fiber diet, please refer to the table on the last page.  2 days before:    Continue the low-fiber diet.     Drink at least 8 glasses of water throughout the day.     Stop eating solid foods at 11:45 pm.  1 day before:    In the morning: begin a clear liquid diet (liquids you can see through).     Examples of a clear liquid diet include: water, clear broth or bouillon, Gatorade, Pedialyte or Powerade, carbonated and non-carbonated soft drinks (Sprite , 7-Up , ginger ale), strained fruit juices without pulp (apple, white grape, white cranberry), Jell-O  and popsicles.     The following are not allowed on a clear liquid diet: red liquids, alcoholic beverages, coffee, dairy products (milk, creamer, and yogurt), protein shakes, creamy broths, juice with pulp and chewing tobacco.    At noon: take 2 (two) bisacodyl tablets     At 4 (and no later than 6pm): start drinking the Miralax-Gatorade preparation (8.3 oz of Miralax mixed with 64 oz of Gatorade in a large pitcher). Drink 1(one) 8 oz glass every 15 minutes thereafter, until the mixture is  gone.    COLON CLEANSING TIPS: drink adequate amounts of fluids before and after your colon cleansing to prevent dehydration. Stay near a toilet because you will have diarrhea. Even if you are sitting on the toilet, continue to drink the cleansing solution every 15 minutes. If you feel nauseous or vomit, rinse your mouth with water, take a 15 to 30-minute-break and then continue drinking the solution. You will be uncomfortable until the stool has flushed from your colon (in about 2 to 4 hours). You may feel chilled.    Day of your procedure  You may take all of your morning medications including blood pressure medications, blood thinners (if you have not been instructed to stop these by our office), methadone, anti-seizure medications with sips of water 3 hours prior to your procedure or earlier. Do not take insulin or vitamins prior to your procedure. Continue the clear liquid diet.   4 hours prior: drink 10 oz of magnesium citrate. It may be easier to drink it with a straw.    STOP consuming all liquids after that.     Do not take anything by mouth during this time.     Allow extra time to travel to your procedure as you may need to stop and use a restroom along the way.  You are ready for the procedure, if you followed all instructions and your stool is no longer formed, but clear or yellow liquid. If you are unsure whether your colon is clean, please call our office at [phone] before you leave for your appointment.  Bring the following to your procedure:  - Insurance Card/Photo ID.   - List of current medications including over-the-counter medications and supplements.   - Your rescue inhaler if you currently use one to control asthma.      Canceling or rescheduling your appointment:   If you must cancel or reschedule your appointment, please call [phone] as soon as possible.      COLONOSCOPY PRE-PROCEDURE CHECKLIST  If you have diabetes, ask your regular doctor for diet and medication restrictions.  If  you take an anticoagulant or anti-platelet medication (such as Coumadin , Lovenox , Pradaxa , Xarelto , Eliquis , etc.), please call your primary doctor for advice on holding this medication.  If you take aspirin you may continue to do so.  If you are or may be pregnant, please discuss the risks and benefits of this procedure with your doctor.          What happens during a colonoscopy?    Plan to spend up to two hours, starting at registration time, at the endoscopy center the day of your procedure. The colonoscopy takes an average of 15 to 30 minutes. Recovery time is about 30 minutes.    Before the exam:    You will change into a gown.    Your medical history and medication list will be reviewed with you, unless that has been done over the phone prior to the procedure.     A nurse will insert an intravenous (IV) line into your hand or arm.    The doctor will meet with you and will give you a consent form to sign.    During the exam:     Medicine will be given through the IV line to help you relax.     Your heart rate and oxygen levels will be monitored. If your blood pressure is low, you may be given fluids through the IV line.     The doctor will insert a flexible hollow tube, called a colonoscope, into your rectum. The scope will be advanced slowly through the large intestine (colon).    You may have a feeling of fullness or pressure.     If an abnormal tissue or a polyp is found, the doctor may remove it through the endoscope for closer examination, or biopsy. Tissue removal is painless    After the exam:           Any tissue samples removed during the exam will be sent to a lab for evaluation. It may take 5-7 working days for you to be notified of the results.     A nurse will provide you with complete discharge instructions before you leave the endoscopy center. Be sure to ask the nurse for specific instructions if you take blood thinners such as Aspirin, Coumadin or Plavix.     The doctor will prepare a  full report for you and for the physician who referred you for the procedure.     Your doctor will talk with you about the initial results of your exam.      Medication given during the exam will prohibit you from driving for the rest of the day.     Following the exam, you may resume your normal diet. Your first meal should be light, no greasy foods. Avoid alcohol until the next day.     You may resume your regular activities the day after the procedure.     LOW-FIBER DIET    Foods RECOMMENDED Foods to AVOID   Breads, Cereal, Rice and Pasta:   White bread, rolls, biscuits, croissant and stormy toast.   Waffles, Northern Irish toast and pancakes.   White rice, noodles, pasta, macaroni and peeled cooked potatoes.   Plain crackers and saltines.   Cooked cereals: farina, cream of rice.   Cold cereals: Puffed Rice , Rice Krispies , Corn Flakes  and Special K    Breads, Cereal, Rice and Pasta:   Breads or rolls with nuts, seeds or fruit.   Whole wheat, pumpernickel, rye breads and cornbread.   Potatoes with skin, brown or wild rice, and kasha (buckwheat).     Vegetables:   Tender cooked and canned vegetables without seeds: carrots, asparagus tips, green or wax beans, pumpkin, spinach, lima beans. Vegetables:   Raw or steamed vegetables.   Vegetables with seeds.   Sauerkraut.   Winter squash, peas, broccoli, Brussel sprouts, cabbage, onions, cauliflower, baked beans, peas and corn.   Fruits:   Strained fruit juice.   Canned fruit, except pineapple.   Ripe bananas and melon. Fruits:   Prunes and prune juice.   Raw fruits.   Dried fruits: figs, dates and raisins.   Milk/Dairy:   Milk: plain or flavored.   Yogurt, custard and ice cream.   Cheese and cottage cheese Milk/Dairy:     Meat and other proteins:   ground, well-cooked tender beef, lamb, ham, veal, pork, fish, poultry and organ meats.   Eggs.   Peanut butter without nuts. Meat and other proteins:   Tough, fibrous meats with gristle.   Dry beans, peas and lentils.   Peanut  butter with nuts.   Tofu.   Fats, Snack, Sweets, Condiments and Beverages:   Margarine, butter, oils, mayonnaise, sour cream and salad dressing, plain gravy.   Sugar, hard candy, clear jelly, honey and syrup.   Spices, cooked herbs, bouillon, broth and soups made with allowed vegetable, ketchup and mustard.   Coffee, tea and carbonated drinks.   Plain cakes, cookies and pretzels.   Gelatin, plain puddings, custard, ice cream, sherbet and popsicles. Fats, Snack, Sweets, Condiments and Beverages:   Nuts, seeds and coconut.   Jam, marmalade and preserves.   Pickles, olives, relish and horseradish.   All desserts containing nuts, seeds, dried fruit and coconut; or made from whole grains or bran.   Candy made with nuts or seeds.   Popcorn.           DIRECTIONS TO THE ENDOSCOPY DEPARTMENT     From the north ([facility])  Take [address] South, exit on [address] [address]. Get into the left hand flip, turn left (east), go one-half mile to [address] and turn left. Go north to the first stoplight, take a right on [address] and follow it to the Emergency entrance.    From the south ([facility])  Take 35N to the [address]E split and exit on [address] [address]. On [address] [address], turn left (west) to [address]. Turn right (north) on [address]. Go north to the first stoplight, take a right on [address] and follow it to the Emergency entrance.    From the east via 35E ([facility])  Take [address] south to [address] [address] exit. Turn right on [address] . Go west to [address]. Turn right (north) on [address]. Go to the first stoplight, take a right and follow on [address] to the Emergency entrance.    From the east via [address] ([facility])  Take [address] to [address]. Turn left (south) on [address] to [address]. Turn left (east) on [address] and follow it to the Emergency entrance.    From the west via [address] ([place], [place])  Take  [address] east to [address]. Turn right (south) on [address] to [address] [employer] [address] [employer]. Turn left (east) on [address] and follow it to the Emergency entrance.

## 2017-08-03 NOTE — H&P
Pre-Endoscopy History and Physical     Latesha Ruiz MRN# [id number]   YOB: 1960 Age: 57 year old     Date of Procedure: 8/3/2017  Primary care provider: Maday Anaya  Type of Endoscopy: Colonoscopy with possible biopsy, possible polypectomy  Reason for Procedure: screening   Type of Anesthesia Anticipated: Conscious Sedation    HPI:    Latesha is a 57 year old female who will be undergoing the above procedure.      A history and physical has been performed. The patient's medications and allergies have been reviewed. The risks and benefits of the procedure and the sedation options and risks were discussed with the patient.  All questions were answered and informed consent was obtained.      She denies a personal or family history of anesthesia complications or bleeding disorders.     Patient Active Problem List   Diagnosis     Allergic rhinitis     Osteoporosis     RUSSEL (generalized anxiety disorder)     CARDIOVASCULAR SCREENING; LDL GOAL LESS THAN 160     Lactose intolerance     Stress incontinence     Sebaceous hyperplasia of face     AK (actinic keratosis)     Vitamin D deficiency        Past Medical History:   Diagnosis Date     AK (actinic keratosis) 2013     Allergic rhinitis, cause unspecified      Closed fracture of metacarpal bone(s), site unspecified     Right 5th metacarpal fx--caught in a door     Closed fracture of rib(s), unspecified ~     Contact dermatitis and other eczema, due to unspecified cause      Disorder of bone and cartilage, unspecified ~2000    T-score -2.00 in L-spine in      Lumbago      Premature menopause     Elevated FSH/LH in --normal brain MRI 2001        Past Surgical History:   Procedure Laterality Date     BREAST SURGERY  ,     benign lumps     C APPENDECTOMY       C  DELIVERY ONLY      Primary C/S - FTP     C  DELIVERY ONLY      Repeat C/S     C  DELIVERY ONLY      Repeat C/S, TL  "    C NONSPECIFIC PROCEDURE  1989    Breast Bx - Right       Social History   Substance Use Topics     Smoking status: Never Smoker     Smokeless tobacco: Never Used     Alcohol use No       Family History   Problem Relation Age of Onset     Psychotic Disorder Mother      anxiety     Thyroid Disease Mother      Born [birth year]. Has CAD, two stents placed.      C.A.D. Mother      Had MI/stents x 3 at age 84.     CANCER Mother      BCC of face     Family History Negative Father      Born . Resides in SNF. Has spinal stenosis, difficulty ambulating, also short term memory loss.      Breast Cancer Other      GASTROINTESTINAL DISEASE Son      gastroesophageal reflux     Neurologic Disorder Son      seizure disorder     Family History Negative Sister      Brief period of anxiety, born      Thyroid Disease Sister      Non cancerous growth on thyroid removed     Psychotic Disorder Brother      anxiety/Born      Respiratory Child      mild asthma/mild asthma     CANCER Paternal Grandfather      ?stomach CA (was a digestive organ)     Family History Negative Maternal Grandfather       age 74, pulmonary embolism. Had colitis     Breast Cancer Maternal Grandmother        Prior to Admission medications    Medication Sig Start Date End Date Taking? Authorizing Provider   order for DME Knee sleeve 17   Brody James MD   fluticasone (FLONASE) 50 MCG/ACT spray Spray 1 spray into both nostrils daily 17   Miesha Ortega MD       Allergies   Allergen Reactions     Asa [Aspirin]      Codeine Nausea and Vomiting     Erythromycin Nausea     Penicillins Hives        REVIEW OF SYSTEMS:   5 point ROS negative except as noted above in HPI, including Gen., Resp., CV, GI &  system review.    PHYSICAL EXAM:   There were no vitals taken for this visit. Estimated body mass index is 23.64 kg/(m^2) as calculated from the following:    Height as of 17: 1.753 m (5' 9\").    Weight as of 17: 72.6 kg (160 lb 1.6 oz). "   GENERAL APPEARANCE: alert, and oriented  MENTAL STATUS: alert  AIRWAY EXAM: Mallampatti Class II (visualization of the soft palate, fauces, and uvula)  RESP: lungs clear to auscultation - no rales, rhonchi or wheezes  CV: regular rates and rhythm  DIAGNOSTICS:    Not indicated    IMPRESSION   ASA Class 3 - Severe systemic disease, but not incapacitating    PLAN:   Plan for Colonoscopy with possible biopsy, possible polypectomy. We discussed the risks, benefits and alternatives and the patient wished to proceed.      Signed Electronically by: Mario Leyva  August 3, 2017

## 2017-08-03 NOTE — LETTER
July 18, 2017      Latesha Ruiz  [address] [facility] [address] [address]              Dear Latesha Ruiz,    Thank you for choosing [facility]. You are scheduled for the following service.     Date:  7/27/2017 Thursday             Procedure:  COLONOSCOPY  Doctor:        Dr. Mario Leyva   Arrival Time:  10:45 AM  *check in at Emergency/Endoscopy desk*  Procedure Time:  11:15 AM    Location:   [facility]        Endoscopy Department, First Floor (Enter through ER Doors) *        [address] [place], [place] [address]      [phone] or [phone] () to reschedule        Colonoscopy is the most accurate test to detect colon polyps and colon cancer; and the only test where polyps can be removed. During this procedure, a doctor examines the lining of your large intestine and rectum through a flexible tube.           Transportation  Arrange for a ride for the day of your procedure with a responsible adult.  A taxi ride is not an option unless you are accompanied by a responsible adult. If you fail to arrange transportation with a responsible adult, your procedure will be cancelled and rescheduled.    MIRALAX -GATORADE  PREP    Purchase the following supplies at your local pharmacy:  - 2 (two) bisacodyl tablets: each tablet contains 5 mg.  (Dulcolax  laxative NOT Dulcolax  stool softener)   - 1 (one) 8.3 oz bottle of Polyethylene Glycol (PEG) 3350 Powder   (MiraLAX , Smooth LAX , ClearLAX  or equivalent)  - 64 oz Gatorade    Regular Gatorade, Gatorade G2 , Powerade , Powerade Zero  or Pedialyte  is acceptable. Red colored flavors are not allowed; all other colors (yellow, green, orange, purple and blue) are okay. It is also okay to buy two 2.12 oz packets of powdered Gatorade that can be mixed with water to a total volume of 64 oz of liquid.  - 1 (one) 10 oz bottle of Magnesium Citrate (Red colored flavors are not allowed)  It is also  okay for you to use a 0.5 oz package of powdered magnesium citrate (17 g) mixed with 10 oz of water.      PREPARATION FOR COLONOSCOPY    7 days before:    Discontinue fiber supplements and medications containing iron. This includes Metamucil  and Fibercon ; and multivitamins with iron.  3 days before:    Begin a low-fiber diet. A low-fiber diet helps making the cleanout more effective.     Examples of a low-fiber diet include (but are not limited to): white bread, white rice, pasta, crackers, fish, chicken, eggs, ground beef, creamy peanut butter, cooked/steamed/boiled vegetables, canned fruit, bananas, melons, milk, plain yogurt cheese, salad dressing and other condiments.     The following are not allowed on a low-fiber diet: seeds, nuts, popcorn, bran, whole wheat, corn, quinoa, raw fruits and vegetables, berries and dried fruit, beans and lentils.    For additional details on low-fiber diet, please refer to the table on the last page.  2 days before:    Continue the low-fiber diet.     Drink at least 8 glasses of water throughout the day.     Stop eating solid foods at 11:45 pm.  1 day before:    In the morning: begin a clear liquid diet (liquids you can see through).     Examples of a clear liquid diet include: water, clear broth or bouillon, Gatorade, Pedialyte or Powerade, carbonated and non-carbonated soft drinks (Sprite , 7-Up , ginger ale), strained fruit juices without pulp (apple, white grape, white cranberry), Jell-O  and popsicles.     The following are not allowed on a clear liquid diet: red liquids, alcoholic beverages, coffee, dairy products (milk, creamer, and yogurt), protein shakes, creamy broths, juice with pulp and chewing tobacco.    At noon: take 2 (two) bisacodyl tablets     At 4 (and no later than 6pm): start drinking the Miralax-Gatorade preparation (8.3 oz of Miralax mixed with 64 oz of Gatorade in a large pitcher). Drink 1(one) 8 oz glass every 15 minutes thereafter, until the mixture is  gone.    COLON CLEANSING TIPS: drink adequate amounts of fluids before and after your colon cleansing to prevent dehydration. Stay near a toilet because you will have diarrhea. Even if you are sitting on the toilet, continue to drink the cleansing solution every 15 minutes. If you feel nauseous or vomit, rinse your mouth with water, take a 15 to 30-minute-break and then continue drinking the solution. You will be uncomfortable until the stool has flushed from your colon (in about 2 to 4 hours). You may feel chilled.    Day of your procedure  You may take all of your morning medications including blood pressure medications, blood thinners (if you have not been instructed to stop these by our office), methadone, anti-seizure medications with sips of water 3 hours prior to your procedure or earlier. Do not take insulin or vitamins prior to your procedure. Continue the clear liquid diet.   4 hours prior: drink 10 oz of magnesium citrate. It may be easier to drink it with a straw.    STOP consuming all liquids after that.     Do not take anything by mouth during this time.     Allow extra time to travel to your procedure as you may need to stop and use a restroom along the way.  You are ready for the procedure, if you followed all instructions and your stool is no longer formed, but clear or yellow liquid. If you are unsure whether your colon is clean, please call our office at [phone] before you leave for your appointment.  Bring the following to your procedure:  - Insurance Card/Photo ID.   - List of current medications including over-the-counter medications and supplements.   - Your rescue inhaler if you currently use one to control asthma.      Canceling or rescheduling your appointment:   If you must cancel or reschedule your appointment, please call [phone] as soon as possible.      COLONOSCOPY PRE-PROCEDURE CHECKLIST  If you have diabetes, ask your regular doctor for diet and medication restrictions.  If  you take an anticoagulant or anti-platelet medication (such as Coumadin , Lovenox , Pradaxa , Xarelto , Eliquis , etc.), please call your primary doctor for advice on holding this medication.  If you take aspirin you may continue to do so.  If you are or may be pregnant, please discuss the risks and benefits of this procedure with your doctor.          What happens during a colonoscopy?    Plan to spend up to two hours, starting at registration time, at the endoscopy center the day of your procedure. The colonoscopy takes an average of 15 to 30 minutes. Recovery time is about 30 minutes.    Before the exam:    You will change into a gown.    Your medical history and medication list will be reviewed with you, unless that has been done over the phone prior to the procedure.     A nurse will insert an intravenous (IV) line into your hand or arm.    The doctor will meet with you and will give you a consent form to sign.    During the exam:     Medicine will be given through the IV line to help you relax.     Your heart rate and oxygen levels will be monitored. If your blood pressure is low, you may be given fluids through the IV line.     The doctor will insert a flexible hollow tube, called a colonoscope, into your rectum. The scope will be advanced slowly through the large intestine (colon).    You may have a feeling of fullness or pressure.     If an abnormal tissue or a polyp is found, the doctor may remove it through the endoscope for closer examination, or biopsy. Tissue removal is painless    After the exam:           Any tissue samples removed during the exam will be sent to a lab for evaluation. It may take 5-7 working days for you to be notified of the results.     A nurse will provide you with complete discharge instructions before you leave the endoscopy center. Be sure to ask the nurse for specific instructions if you take blood thinners such as Aspirin, Coumadin or Plavix.     The doctor will prepare a  full report for you and for the physician who referred you for the procedure.     Your doctor will talk with you about the initial results of your exam.      Medication given during the exam will prohibit you from driving for the rest of the day.     Following the exam, you may resume your normal diet. Your first meal should be light, no greasy foods. Avoid alcohol until the next day.     You may resume your regular activities the day after the procedure.     LOW-FIBER DIET    Foods RECOMMENDED Foods to AVOID   Breads, Cereal, Rice and Pasta:   White bread, rolls, biscuits, croissant and stormy toast.   Waffles, Uzbek toast and pancakes.   White rice, noodles, pasta, macaroni and peeled cooked potatoes.   Plain crackers and saltines.   Cooked cereals: farina, cream of rice.   Cold cereals: Puffed Rice , Rice Krispies , Corn Flakes  and Special K    Breads, Cereal, Rice and Pasta:   Breads or rolls with nuts, seeds or fruit.   Whole wheat, pumpernickel, rye breads and cornbread.   Potatoes with skin, brown or wild rice, and kasha (buckwheat).     Vegetables:   Tender cooked and canned vegetables without seeds: carrots, asparagus tips, green or wax beans, pumpkin, spinach, lima beans. Vegetables:   Raw or steamed vegetables.   Vegetables with seeds.   Sauerkraut.   Winter squash, peas, broccoli, Brussel sprouts, cabbage, onions, cauliflower, baked beans, peas and corn.   Fruits:   Strained fruit juice.   Canned fruit, except pineapple.   Ripe bananas and melon. Fruits:   Prunes and prune juice.   Raw fruits.   Dried fruits: figs, dates and raisins.   Milk/Dairy:   Milk: plain or flavored.   Yogurt, custard and ice cream.   Cheese and cottage cheese Milk/Dairy:     Meat and other proteins:   ground, well-cooked tender beef, lamb, ham, veal, pork, fish, poultry and organ meats.   Eggs.   Peanut butter without nuts. Meat and other proteins:   Tough, fibrous meats with gristle.   Dry beans, peas and lentils.   Peanut  butter with nuts.   Tofu.   Fats, Snack, Sweets, Condiments and Beverages:   Margarine, butter, oils, mayonnaise, sour cream and salad dressing, plain gravy.   Sugar, hard candy, clear jelly, honey and syrup.   Spices, cooked herbs, bouillon, broth and soups made with allowed vegetable, ketchup and mustard.   Coffee, tea and carbonated drinks.   Plain cakes, cookies and pretzels.   Gelatin, plain puddings, custard, ice cream, sherbet and popsicles. Fats, Snack, Sweets, Condiments and Beverages:   Nuts, seeds and coconut.   Jam, marmalade and preserves.   Pickles, olives, relish and horseradish.   All desserts containing nuts, seeds, dried fruit and coconut; or made from whole grains or bran.   Candy made with nuts or seeds.   Popcorn.           DIRECTIONS TO THE ENDOSCOPY DEPARTMENT     From the north ([facility])  Take [address] South, exit on [address] [address]. Get into the left hand flip, turn left (east), go one-half mile to [address] and turn left. Go north to the first stoplight, take a right on [address] and follow it to the Emergency entrance.    From the south ([facility])  Take 35N to the 35E split and exit on [address] [address]. On [address] [address], turn left (west) to [address]. Turn right (north) on [address]. Go north to the first stoplight, take a right on [address] and follow it to the Emergency entrance.    From the east via 35E ([facility])  Take [address]E south to [address] [address] exit. Turn right on [address] . Go west to [address]. Turn right (north) on [address]. Go to the first stoplight, take a right and follow on [address] to the Emergency entrance.    From the east via [address] ([facility])  Take [address] to [address]. Turn left (south) on [address] to [address]. Turn left (east) on [address] and follow it to the Emergency entrance.    From the west via [address] ([place], [place])  Take  [address] east to [address]. Turn right (south) on [address] to [employer] Turn left (east) on [address] and follow it to the Emergency entrance.

## 2017-08-03 NOTE — IP AVS SNAPSHOT
MRN:[id number]                      After Visit Summary   8/3/2017    Latesha Ruiz    MRN: [id number]           Thank you!     Thank you for choosing [facility] for your care. Our goal is always to provide you with excellent care. Hearing back from our patients is one way we can continue to improve our services. Please take a few minutes to complete the written survey that you may receive in the mail after you visit. If you would like to speak to someone directly about your visit please contact Patient Relations at [phone]. Thank you!          Patient Information     Date Of Birth          1960        About your hospital stay     You were admitted on:  August 3, 2017 You last received care in the:  [facility]    You were discharged on:  August 3, 2017       Who to Call     For medical emergencies, please call 911.  For non-urgent questions about your medical care, please call your primary care provider or clinic, [phone]  For questions related to your surgery, please call your surgery clinic        Attending Provider     Provider Mario Herron MD Surgery       Primary Care Provider Office Phone # Fax #    Maday Anaya [phone]      Further instructions from your care team       The patient has received a copy of the Provation  report the doctor has written and discharge instructions have been discussed with the patient and responsible adult.  All questions were addressed and answered prior to patient discharge.        Understanding Colon and Rectal Polyps     The colon has a smooth lining composed of millions of cells.     The colon (also called the large intestine) is a muscular tube that forms the last part of the digestive tract. It absorbs water and stores food waste. The colon is about 4 to 6 feet long. The rectum is the last 6 inches of the colon. The colon and rectum have a smooth lining  composed of millions of cells. Changes in these cells can lead to growths in the colon that can become cancerous and should be removed.     When the Colon Lining Changes  Changes that occur in the cells that line the colon or rectum can lead to growths called polyps. Over a period of years, polyps can turn cancerous. Removing polyps early may prevent cancer from ever forming.      Polyps  Polyps are fleshy clumps of tissue that form on the lining of the colon or rectum. Small polyps are usually benign (not cancerous). However, over time, cells in a polyp can change and become cancerous. The larger a polyp grows, the more likely this is to happen. Also, certain types of polyps known as adenomatous polyps are considered premalignant. This means that they will almost always become cancerous if they re not removed.          Cancer  Almost all colorectal cancers start when polyp cells begin growing abnormally. As a cancerous tumor grows, it may involve more and more of the colon or rectum. In time, cancer can also grow beyond the colon or rectum and spread to nearby organs or to glands called lymph nodes. The cells can also travel to other parts of the body. This is known as metastasis. The earlier a cancerous tumor is removed, the better the chance of preventing its spread.        [id number] [facility], [address] [place] [address], [place] [address]. All rights reserved. This information is not intended as a substitute for professional medical care. Always follow your healthcare professional's instructions.      Eating a High-Fiber Diet  Fiber is what gives strength and structure to plants. Most grains, beans, vegetables, and fruits contain fiber. Foods rich in fiber are often low in calories and fat, and they fill you up more. They may also reduce your risks for certain health problems. To find out the amount of fiber in canned, packaged, or frozen foods, read the  Nutrition Facts  label. It tells you how much fiber is  in a serving.      Types of Fiber and Their Benefits  There are two types of fiber: insoluble and soluble. They both aid digestion and help you maintain a healthy weight.  Insoluble fiber: This is found in whole grains, cereals, certain fruits and vegetables (such as apple skin, corn, and carrots). Insoluble fiber may prevent constipation and reduce the risk of certain types of cancer.   Soluble fiber: This type of fiber is in oats, beans, and certain fruits and vegetables (such as strawberries and peas). Soluble fiber can reduce cholesterol (which may help lower the risk of heart disease), and helps control blood sugar levels.  Look for High-Fiber Foods  Whole-grain breads and cereals: Try to eat 6-8 ounces a day. Include wheat and oat bran cereals, whole-wheat muffins or toast, and corn tortillas in your meals.  Fruits: Try to eat 2 cups a day. Apples, oranges, strawberries, pears, and bananas are good sources. (Note: Fruit juice is low in fiber.)  Vegetables: Try to eat 3 cups a day. Add asparagus, carrots, broccoli, peas, and corn to your meals.  Legumes (beans): One cup of cooked lentils gives you over 15 grams of fiber. Try navy beans, lentils, and chickpeas.  Seeds:  A small handful of seeds gives you about 3 grams of fiber. Try sunflower seeds.    Keep Track of Your Fiber  A healthy diet includes 31 grams of fiber a day if you have a 2,000-calorie diet. Keep track of how much fiber you eat. Start by reading food labels. Then eat a variety of foods high in fiber. Ask your doctor about supplemental fiber products.            [id number] [employer], [address] [place] [address], [place] [address]. All rights reserved. This information is not intended as a substitute for professional medical care. Always follow your healthcare professional's instructions.      HIGH FIBER DIET  Fiber is present in all fruits, vegetables, cereals and grains. Fiber passes through the body undigested. A high fiber diet helps food move  through the intestinal tract. The added bulk is helpful in preventing constipation. In people with diverticulosis it serves to clean out the pouches along the colon wall while preventing new ones from forming. A high fiber diet also reduces the risk of colon cancer, decreases blood cholesterol and prevents high blood sugar in people with diabetes.    The foods listed below are high in fiber and should be included in your diet. If you are not used to high fiber foods, start with 1 or 2 foods from this list. Every 3-4 days add a new one to your diet until you are eating 4 high fiber foods per day. This should give you 20-35 Gm of fiber/day. It is also important to drink a lot of water when you are on this diet (6-8 glasses a day). Water causes the fiber to swell and increases the benefit.    FOODS HIGH IN DIETARY FIBER:  BREADS: Made with 100% whole wheat flour; monica, wheat or rye crackers; tortillas, bran muffins  CEREALS: Whole grain cereal with bran (Chex, Raisin Bran, Corn Bran), oatmeal, rolled oats, granola, wheat flakes, brown rice  NUTS: Any nuts  FRUITS: All fresh fruits along with edible skins, (bananas, citrus fruit, mangoes, pears, prunes, raisins, apples, pineapple, apricot, melon, jams and marmalades), fruit juices (especially prune juice)  VEGETABLES: All types, preferably raw or lightly cooked: especially, celery, eggplant, potatoes, spinach, broccoli, brussel sprouts, winter squash, carrots, cauliflower, soybeans, lentils, fresh and dried beans of all kinds  OTHER: Popcorn, any spices      [id number] [facility], [address] [place] [address]. All rights reserved. This information is not intended as a substitute for professional medical care. Always follow your healthcare professional's instructions.    Pending Results     No orders found from 8/1/2017 to 8/4/2017.            Admission Information     Date & Time Provider Department Dept. Phone    8/3/2017 Mario Leyva MD  [facility] [phone]      Your Vitals Were     Blood Pressure Respirations Pulse Oximetry             116/67 10 99%         [employer] Information     [employer] gives you secure access to your electronic health record. If you see a primary care provider, you can also send messages to your care team and make appointments. If you have questions, please call your primary care clinic.  If you do not have a primary care provider, please call [phone] and they will assist you.        Care EveryWhere ID     This is your Care EveryWhere ID. This could be used by other organizations to access your [facility] medical records  [id number]        Equal Access to Services     [facility]O [facility] : Hadii luh Barahona, edvin lucas, cherry doughertyalmafrancesco hunter, guillaume menjivar . So [facility] [phone].    ATENCIÓN: Si habla español, tiene a martinez disposición servicios gratuitos de asistencia lingüística. Llame al [phone].    We comply with applicable federal civil rights laws and [place] laws. We do not discriminate on the basis of race, color, national origin, age, disability sex, sexual orientation or gender identity.               Review of your medicines      CONTINUE these medicines which have NOT CHANGED        Dose / Directions    fluticasone 50 MCG/ACT spray   Commonly known as:  FLONASE   Used for:  Chronic nonseasonal allergic rhinitis due to other allergen        Dose:  1 spray   Spray 1 spray into both nostrils daily   Quantity:  1 Bottle   Refills:  11       order for DME   Used for:  Acute pain of right knee, Effusion of right knee        Knee sleeve   Quantity:  1 Device   Refills:  0                Protect others around you: Learn how to safely use, store and throw away your medicines at www.disposemymeds.org.             Medication List: This is a list of all your medications and when to take them. Check marks below indicate your daily home schedule. Keep this list  as a reference.      Medications           Morning Afternoon Evening Bedtime As Needed    fluticasone 50 MCG/ACT spray   Commonly known as:  FLONASE   Spray 1 spray into both nostrils daily                                order for DME   Knee sleeve

## 2017-08-04 LAB — COPATH REPORT: NORMAL

## 2017-09-05 ENCOUNTER — OFFICE VISIT (OUTPATIENT)
Dept: URGENT CARE | Facility: URGENT CARE | Age: 57
End: 2017-09-05
Payer: COMMERCIAL

## 2017-09-05 VITALS
WEIGHT: 157.1 LBS | SYSTOLIC BLOOD PRESSURE: 110 MMHG | HEART RATE: 58 BPM | DIASTOLIC BLOOD PRESSURE: 80 MMHG | BODY MASS INDEX: 23.2 KG/M2 | TEMPERATURE: 98.5 F | OXYGEN SATURATION: 99 %

## 2017-09-05 DIAGNOSIS — J02.9 ACUTE PHARYNGITIS, UNSPECIFIED: Primary | ICD-10-CM

## 2017-09-05 LAB
DEPRECATED S PYO AG THROAT QL EIA: NORMAL
SPECIMEN SOURCE: NORMAL

## 2017-09-05 PROCEDURE — 99213 OFFICE O/P EST LOW 20 MIN: CPT | Performed by: FAMILY MEDICINE

## 2017-09-05 PROCEDURE — 87081 CULTURE SCREEN ONLY: CPT | Performed by: FAMILY MEDICINE

## 2017-09-05 PROCEDURE — 87880 STREP A ASSAY W/OPTIC: CPT | Performed by: FAMILY MEDICINE

## 2017-09-05 NOTE — MR AVS SNAPSHOT
After Visit Summary   9/5/2017    Latesha Ruiz    MRN: [id number]           Patient Information     Date Of Birth          1960        Visit Information        Provider Department      9/5/2017 6:30 PM Fidelina Newman MD [facility]        Today's Diagnoses     Acute pharyngitis, unspecified    -  1       Follow-ups after your visit        Who to contact     If you have questions or need follow up information about today's clinic visit or your schedule please contact [facility] directly at [phone].  Normal or non-critical lab and imaging results will be communicated to you by [employer], letter or phone within 4 business days after the clinic has received the results. If you do not hear from us within 7 days, please contact the clinic through "[employer]t or phone. If you have a critical or abnormal lab result, we will notify you by phone as soon as possible.  Submit refill requests through [employer] or call your pharmacy and they will forward the refill request to us. Please allow 3 business days for your refill to be completed.          Additional Information About Your Visit        MyChart Information     [employer] gives you secure access to your electronic health record. If you see a primary care provider, you can also send messages to your care team and make appointments. If you have questions, please call your primary care clinic.  If you do not have a primary care provider, please call [phone] and they will assist you.        Care EveryWhere ID     This is your Care EveryWhere ID. This could be used by other organizations to access your [facility] medical records  [id number]        Your Vitals Were     Pulse Temperature Pulse Oximetry BMI (Body Mass Index)          58 98.5  F (36.9  C) (Tympanic) 99% 23.2 kg/m2         Blood Pressure from Last 3 Encounters:   09/05/17 110/80   08/03/17 99/57   07/30/17 90/50    Weight from Last 3 Encounters:   09/05/17  157 lb 1.6 oz (71.3 kg)   07/30/17 160 lb 1.6 oz (72.6 kg)   07/26/17 157 lb (71.2 kg)              We Performed the Following     Beta strep group A culture     Strep, Rapid Screen        Primary Care Provider Office Phone # Fax #    Maday Anaya [phone] [address] [place] [address]        Equal Access to Services     [facility]: Hadii aad ku hadasho Soomaali, waaxda luqadaha, qaybta kaalmada adeegyada, waxay idiin hayaan adeeg kharash la'aan . So [facility] [phone].    ATENCIÓN: Si gloriala espmagen, tiene a martinez disposición servicios gratuitos de asistencia lingüística. Llame al [phone].    We comply with applicable federal civil rights laws and [place] laws. We do not discriminate on the basis of race, color, national origin, age, disability sex, sexual orientation or gender identity.            Thank you!     Thank you for choosing [facility]  for your care. Our goal is always to provide you with excellent care. Hearing back from our patients is one way we can continue to improve our services. Please take a few minutes to complete the written survey that you may receive in the mail after your visit with us. Thank you!             Your Updated Medication List - Protect others around you: Learn how to safely use, store and throw away your medicines at www.disposemymeds.org.          This list is accurate as of: 9/5/17 11:59 PM.  Always use your most recent med list.                   Brand Name Dispense Instructions for use Diagnosis    fluticasone 50 MCG/ACT spray    FLONASE    1 Bottle    Spray 1 spray into both nostrils daily    Chronic nonseasonal allergic rhinitis due to other allergen       order for DME     1 Device    Knee sleeve    Acute pain of right knee, Effusion of right knee

## 2017-09-06 LAB
BACTERIA SPEC CULT: NORMAL
SPECIMEN SOURCE: NORMAL

## 2017-09-12 ENCOUNTER — RADIANT APPOINTMENT (OUTPATIENT)
Dept: GENERAL RADIOLOGY | Facility: CLINIC | Age: 57
End: 2017-09-12
Attending: PHYSICIAN ASSISTANT
Payer: COMMERCIAL

## 2017-09-12 ENCOUNTER — OFFICE VISIT (OUTPATIENT)
Dept: URGENT CARE | Facility: URGENT CARE | Age: 57
End: 2017-09-12
Payer: COMMERCIAL

## 2017-09-12 VITALS
WEIGHT: 157 LBS | HEART RATE: 76 BPM | RESPIRATION RATE: 16 BRPM | OXYGEN SATURATION: 96 % | DIASTOLIC BLOOD PRESSURE: 60 MMHG | TEMPERATURE: 98.2 F | SYSTOLIC BLOOD PRESSURE: 115 MMHG | BODY MASS INDEX: 23.18 KG/M2

## 2017-09-12 DIAGNOSIS — J02.9 ACUTE PHARYNGITIS, UNSPECIFIED ETIOLOGY: ICD-10-CM

## 2017-09-12 DIAGNOSIS — J02.9 ACUTE PHARYNGITIS, UNSPECIFIED ETIOLOGY: Primary | ICD-10-CM

## 2017-09-12 LAB
DEPRECATED S PYO AG THROAT QL EIA: NORMAL
SPECIMEN SOURCE: NORMAL

## 2017-09-12 PROCEDURE — 87880 STREP A ASSAY W/OPTIC: CPT | Performed by: PHYSICIAN ASSISTANT

## 2017-09-12 PROCEDURE — 71020 XR CHEST 2 VW: CPT

## 2017-09-12 PROCEDURE — 87081 CULTURE SCREEN ONLY: CPT | Performed by: PHYSICIAN ASSISTANT

## 2017-09-12 PROCEDURE — 99213 OFFICE O/P EST LOW 20 MIN: CPT | Performed by: PHYSICIAN ASSISTANT

## 2017-09-12 NOTE — MR AVS SNAPSHOT
After Visit Summary   9/12/2017    Latesha Ruiz    MRN: [id number]           Patient Information     Date Of Birth          1960        Visit Information        Provider Department      9/12/2017 8:30 PM Tomas Shukla PA-C [facility]        Today's Diagnoses     Acute pharyngitis, unspecified etiology    -  1      Care Instructions      With distilled water 2-3x per day for a minimum 2-3 days    Upper Respiratory Infection: Your infection is most likely a virus.  Try longer hot steamy showers, or head over steamy water and drink at least 8 glasses of water daily.  Maintain a healthy diet to help your immune system combat the infection.  If you continue to have symptoms or they become worse, please follow up. VOice rest            Follow-ups after your visit        Who to contact     If you have questions or need follow up information about today's clinic visit or your schedule please contact [facility] directly at [phone].  Normal or non-critical lab and imaging results will be communicated to you by [employer], letter or phone within 4 business days after the clinic has received the results. If you do not hear from us within 7 days, please contact the clinic through [employer] or phone. If you have a critical or abnormal lab result, we will notify you by phone as soon as possible.  Submit refill requests through [employer] or call your pharmacy and they will forward the refill request to us. Please allow 3 business days for your refill to be completed.          Additional Information About Your Visit        MyChart Information     [employer] gives you secure access to your electronic health record. If you see a primary care provider, you can also send messages to your care team and make appointments. If you have questions, please call your primary care clinic.  If you do not have a primary care provider, please call [phone] and they will assist  you.        Care EveryWhere ID     This is your Care EveryWhere ID. This could be used by other organizations to access your [place] medical records  [id number]        Your Vitals Were     Pulse Temperature Respirations Pulse Oximetry BMI (Body Mass Index)       76 98.2  F (36.8  C) (Oral) 16 96% 23.18 kg/m2        Blood Pressure from Last 3 Encounters:   09/12/17 115/60   09/05/17 110/80   08/03/17 99/57    Weight from Last 3 Encounters:   09/12/17 157 lb (71.2 kg)   09/05/17 157 lb 1.6 oz (71.3 kg)   07/30/17 160 lb 1.6 oz (72.6 kg)              We Performed the Following     Beta strep group A culture     Strep, Rapid Screen        Primary Care Provider Office Phone # Fax #    Maday Anaya [phone] [address] [place] [address]        Equal Access to Services     [facility]: Hadii luh goldsteino Sopablito, waaxda luqadaha, qaybta kaalmada adelorayada, guillaume menjivar . So [facility] [phone].    ATENCIÓN: Si habla español, tiene a martinez disposición servicios gratuitos de asistencia lingüística. Llame al [phone].    We comply with applicable federal civil rights laws and [place] laws. We do not discriminate on the basis of race, color, national origin, age, disability sex, sexual orientation or gender identity.            Thank you!     Thank you for choosing [facility]  for your care. Our goal is always to provide you with excellent care. Hearing back from our patients is one way we can continue to improve our services. Please take a few minutes to complete the written survey that you may receive in the mail after your visit with us. Thank you!             Your Updated Medication List - Protect others around you: Learn how to safely use, store and throw away your medicines at www.disposemymeds.org.          This list is accurate as of: 9/12/17  9:17 PM.  Always use your most recent med list.                   Brand Name  Dispense Instructions for use Diagnosis    fluticasone 50 MCG/ACT spray    FLONASE    1 Bottle    Spray 1 spray into both nostrils daily    Chronic nonseasonal allergic rhinitis due to other allergen       order for DME     1 Device    Knee sleeve    Acute pain of right knee, Effusion of right knee

## 2017-09-12 NOTE — PROGRESS NOTES
SUBJECTIVE:  Latesha Ruiz is a 57 year old female with a chief complaint of exposure to strep.  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  Has had a scratchy throat but no known fevers, no rashes, and no other URI symptoms at this time.  She thinks this is likely related to sleeping in the hospital for a couple nights.    Past Medical History:   Diagnosis Date     AK (actinic keratosis) 6/17/2013     Allergic rhinitis, cause unspecified      Closed fracture of metacarpal bone(s), site unspecified 12/04    Right 5th metacarpal fx--caught in a door     Closed fracture of rib(s), unspecified ~1995     Contact dermatitis and other eczema, due to unspecified cause      Disorder of bone and cartilage, unspecified ~4/2000    T-score -2.00 in L-spine in 5/03     Lumbago      Premature menopause     Elevated FSH/LH in 1993--normal brain MRI 12/2001     Current Outpatient Prescriptions   Medication Sig Dispense Refill     order for DME Knee sleeve 1 Device 0     fluticasone (FLONASE) 50 MCG/ACT spray Spray 1 spray into both nostrils daily 1 Bottle 11     Social History   Substance Use Topics     Smoking status: Never Smoker     Smokeless tobacco: Never Used     Alcohol use No     OBJECTIVE:   /80  Pulse 58  Temp 98.5  F (36.9  C) (Tympanic)  Wt 157 lb 1.6 oz (71.3 kg)  SpO2 99%  BMI 23.2 kg/m2  GENERAL APPEARANCE: healthy, alert and no distress EYES: anicteric sclerae, conjunctivae clear HENT: ear canals and TM's normal. Pharynx erythematous, no exudates NECK: supple, non-tender to palpation, no adenopathy noted SKIN: no suspicious lesions or rashes    Rapid Strep test is negative.  Culture pending.    ASSESSMENT:  Pharyngitis, likely viral possibly with irritation from very dry air while staying with son in hospital    PLAN:   I discussed with the patient that a confirmatory strep culture will be  performed and that she will be called if the culture is positive for strep.  We discussed other possible causes of pharyngitis including viruses.  Symptomatic treat with gargles, lozenges, and OTC analgesic as needed.  Follow-up with primary clinic if not improving over the next 3-5 days, sooner if significantly worse.

## 2017-09-13 LAB
BACTERIA SPEC CULT: NORMAL
SPECIMEN SOURCE: NORMAL

## 2017-09-13 NOTE — PATIENT INSTRUCTIONS
With distilled water 2-3x per day for a minimum 2-3 days    Upper Respiratory Infection: Your infection is most likely a virus.  Try longer hot steamy showers, or head over steamy water and drink at least 8 glasses of water daily.  Maintain a healthy diet to help your immune system combat the infection.  If you continue to have symptoms or they become worse, please follow up. VOice rest

## 2017-09-13 NOTE — PROGRESS NOTES
SUBJECTIVE:  Latesha Ruiz is a 57 year old female with a chief complaint of sore throat.  Onset of symptoms was 7 day(s) ago.    Course of illness: still present and improving.  Severity mild and moderate  Current and Associated symptoms: cough , sore throat and hoarse voice      Past Medical History:   Diagnosis Date     AK (actinic keratosis) 6/17/2013     Allergic rhinitis, cause unspecified      Closed fracture of metacarpal bone(s), site unspecified 12/04    Right 5th metacarpal fx--caught in a door     Closed fracture of rib(s), unspecified ~1995     Contact dermatitis and other eczema, due to unspecified cause      Disorder of bone and cartilage, unspecified ~4/2000    T-score -2.00 in L-spine in 5/03     Lumbago      Premature menopause     Elevated FSH/LH in 1993--normal brain MRI 12/2001     Current Outpatient Prescriptions   Medication Sig Dispense Refill     order for DME Knee sleeve 1 Device 0     fluticasone (FLONASE) 50 MCG/ACT spray Spray 1 spray into both nostrils daily (Patient not taking: Reported on 9/12/2017) 1 Bottle 11     Social History   Substance Use Topics     Smoking status: Never Smoker     Smokeless tobacco: Never Used     Alcohol use No       ROS:  Review of systems negative except as stated above.    OBJECTIVE:   /60 (Cuff Size: Adult Regular)  Pulse 76  Temp 98.2  F (36.8  C) (Oral)  Resp 16  Wt 157 lb (71.2 kg)  SpO2 96%  BMI 23.18 kg/m2  GENERAL APPEARANCE: mild hoarseness to voice.  healthy, alert and no distress  EYES: conjunctiva clear  HENT: ear canals and TM's normal.  Nose normal.  Pharynx erythematous  NECK: supple, non-tender to palpation, no adenopathy noted  RESP: lungs clear to auscultation - no rales, rhonchi or wheezes  CV: regular rates and rhythm, normal S1 S2, no murmur noted    Results for orders placed or performed in visit on 09/12/17   Strep, Rapid Screen   Result Value Ref Range    Specimen Description Throat     Rapid Strep A Screen        NEGATIVE: No Group A streptococcal antigen detected by immunoassay, await culture report.   Beta strep group A culture   Result Value Ref Range    Specimen Description Throat     Culture Micro No beta hemolytic Streptococcus Group A isolated          ASSESSMENT:  (J02.9) Acute pharyngitis, unspecified etiology  (primary encounter diagnosis)  Comment: Mild presentation, exposure to strep and pneumonia  Plan: Strep, Rapid Screen, XR Chest 2 Views, Beta         strep group A culture  Voice rest  Follow up with PCP if symptoms worsen or fail to improve  Patient Instructions     With distilled water 2-3x per day for a minimum 2-3 days    Upper Respiratory Infection: Your infection is most likely a virus.  Try longer hot steamy showers, or head over steamy water and drink at least 8 glasses of water daily.  Maintain a healthy diet to help your immune system combat the infection.  If you continue to have symptoms or they become worse, please follow up. VOice rest

## 2017-09-13 NOTE — NURSING NOTE
"Chief Complaint   Patient presents with     Urgent Care     pt c/o laryngitis x 7 days and cough with tightness in chest --x 4 days--non-productive cough some sinus drainag--son had strep and pneumonia last week       Initial /60 (Cuff Size: Adult Regular)  Pulse 76  Temp 98.2  F (36.8  C) (Oral)  Resp 16  Wt 157 lb (71.2 kg)  SpO2 96%  BMI 23.18 kg/m2 Estimated body mass index is 23.18 kg/(m^2) as calculated from the following:    Height as of 7/26/17: 5' 9\" (1.753 m).    Weight as of this encounter: 157 lb (71.2 kg).  Medication Reconciliation: complete    "

## 2017-12-31 DIAGNOSIS — Z20.828 EXPOSURE TO THE FLU: Primary | ICD-10-CM

## 2017-12-31 RX ORDER — OSELTAMIVIR PHOSPHATE 75 MG/1
75 CAPSULE ORAL DAILY
Qty: 10 CAPSULE | Refills: 0 | Status: SHIPPED | OUTPATIENT
Start: 2017-12-31 | End: 2018-06-27

## 2018-01-06 ENCOUNTER — HEALTH MAINTENANCE LETTER (OUTPATIENT)
Age: 58
End: 2018-01-06

## 2018-03-23 ENCOUNTER — TELEPHONE (OUTPATIENT)
Dept: SLEEP MEDICINE | Facility: CLINIC | Age: 58
End: 2018-03-23

## 2018-06-26 ENCOUNTER — DOCUMENTATION ONLY (OUTPATIENT)
Dept: LAB | Facility: CLINIC | Age: 58
End: 2018-06-26

## 2018-06-26 ENCOUNTER — TELEPHONE (OUTPATIENT)
Dept: PEDIATRICS | Facility: CLINIC | Age: 58
End: 2018-06-26

## 2018-06-26 DIAGNOSIS — E55.9 VITAMIN D DEFICIENCY: Primary | ICD-10-CM

## 2018-06-26 DIAGNOSIS — Z00.00 ROUTINE GENERAL MEDICAL EXAMINATION AT A HEALTH CARE FACILITY: ICD-10-CM

## 2018-06-26 NOTE — PROGRESS NOTES
Patient has an up coming lab appointment on  06/27/18 . Please review and place future orders that may be needed.    Thank you,  Lillie Crocker MLT

## 2018-06-26 NOTE — TELEPHONE ENCOUNTER
Patient has lab scheduled 6/27/18 and would like her Vitamin D level checked. She will be fasting and would like her cholesterol and sugars checked also.

## 2018-06-27 ENCOUNTER — OFFICE VISIT (OUTPATIENT)
Dept: PEDIATRICS | Facility: CLINIC | Age: 58
End: 2018-06-27
Payer: COMMERCIAL

## 2018-06-27 VITALS
HEIGHT: 70 IN | TEMPERATURE: 98.2 F | DIASTOLIC BLOOD PRESSURE: 60 MMHG | BODY MASS INDEX: 22.62 KG/M2 | WEIGHT: 158 LBS | OXYGEN SATURATION: 98 % | SYSTOLIC BLOOD PRESSURE: 92 MMHG | HEART RATE: 57 BPM

## 2018-06-27 DIAGNOSIS — Z23 NEED FOR ZOSTER VACCINATION: ICD-10-CM

## 2018-06-27 DIAGNOSIS — Z00.00 ROUTINE GENERAL MEDICAL EXAMINATION AT A HEALTH CARE FACILITY: ICD-10-CM

## 2018-06-27 DIAGNOSIS — Z00.00 ROUTINE GENERAL MEDICAL EXAMINATION AT A HEALTH CARE FACILITY: Primary | ICD-10-CM

## 2018-06-27 DIAGNOSIS — Z12.31 VISIT FOR SCREENING MAMMOGRAM: ICD-10-CM

## 2018-06-27 DIAGNOSIS — D22.9 MULTIPLE PIGMENTED NEVI: ICD-10-CM

## 2018-06-27 DIAGNOSIS — R79.89 ELEVATED LFTS: Primary | ICD-10-CM

## 2018-06-27 DIAGNOSIS — E55.9 VITAMIN D DEFICIENCY: ICD-10-CM

## 2018-06-27 DIAGNOSIS — Z78.0 ASYMPTOMATIC POSTMENOPAUSAL STATUS: ICD-10-CM

## 2018-06-27 LAB
ALBUMIN SERPL-MCNC: 3.8 G/DL (ref 3.4–5)
ALP SERPL-CCNC: 60 U/L (ref 40–150)
ALT SERPL W P-5'-P-CCNC: 59 U/L (ref 0–50)
ANION GAP SERPL CALCULATED.3IONS-SCNC: 11 MMOL/L (ref 3–14)
AST SERPL W P-5'-P-CCNC: 30 U/L (ref 0–45)
BILIRUB SERPL-MCNC: 0.8 MG/DL (ref 0.2–1.3)
BUN SERPL-MCNC: 13 MG/DL (ref 7–30)
CALCIUM SERPL-MCNC: 8.7 MG/DL (ref 8.5–10.1)
CHLORIDE SERPL-SCNC: 107 MMOL/L (ref 94–109)
CHOLEST SERPL-MCNC: 205 MG/DL
CO2 SERPL-SCNC: 24 MMOL/L (ref 20–32)
CREAT SERPL-MCNC: 0.77 MG/DL (ref 0.52–1.04)
DEPRECATED CALCIDIOL+CALCIFEROL SERPL-MC: 19 UG/L (ref 20–75)
GFR SERPL CREATININE-BSD FRML MDRD: 76 ML/MIN/1.7M2
GLUCOSE SERPL-MCNC: 91 MG/DL (ref 70–99)
HDLC SERPL-MCNC: 51 MG/DL
LDLC SERPL CALC-MCNC: 113 MG/DL
NONHDLC SERPL-MCNC: 154 MG/DL
POTASSIUM SERPL-SCNC: 3.9 MMOL/L (ref 3.4–5.3)
PROT SERPL-MCNC: 7 G/DL (ref 6.8–8.8)
SODIUM SERPL-SCNC: 142 MMOL/L (ref 133–144)
TRIGL SERPL-MCNC: 204 MG/DL

## 2018-06-27 PROCEDURE — 80053 COMPREHEN METABOLIC PANEL: CPT | Performed by: PEDIATRICS

## 2018-06-27 PROCEDURE — 90471 IMMUNIZATION ADMIN: CPT | Performed by: PEDIATRICS

## 2018-06-27 PROCEDURE — 90750 HZV VACC RECOMBINANT IM: CPT | Performed by: PEDIATRICS

## 2018-06-27 PROCEDURE — 36415 COLL VENOUS BLD VENIPUNCTURE: CPT | Performed by: PEDIATRICS

## 2018-06-27 PROCEDURE — 82306 VITAMIN D 25 HYDROXY: CPT | Performed by: PEDIATRICS

## 2018-06-27 PROCEDURE — 99396 PREV VISIT EST AGE 40-64: CPT | Mod: 25 | Performed by: PEDIATRICS

## 2018-06-27 PROCEDURE — 80061 LIPID PANEL: CPT | Performed by: PEDIATRICS

## 2018-06-27 ASSESSMENT — ENCOUNTER SYMPTOMS
DIZZINESS: 0
HEMATURIA: 0
MYALGIAS: 1
DIARRHEA: 0
PALPITATIONS: 0
FREQUENCY: 1
BREAST MASS: 0
NERVOUS/ANXIOUS: 0
PARESTHESIAS: 0
DYSURIA: 0
NAUSEA: 0
CONSTIPATION: 0
CHILLS: 0
HEADACHES: 0
HEMATOCHEZIA: 0
ARTHRALGIAS: 0
SHORTNESS OF BREATH: 0
SORE THROAT: 0
FEVER: 0
WEAKNESS: 0
EYE PAIN: 0
COUGH: 0
HEARTBURN: 0
JOINT SWELLING: 0
ABDOMINAL PAIN: 0

## 2018-06-27 NOTE — PROGRESS NOTES
SUBJECTIVE:   CC: Latesha Ruiz is an 58 year old woman who presents for preventive health visit.     Physical   Annual:     Getting at least 3 servings of Calcium per day:  NO    Bi-annual eye exam:  Yes    Dental care twice a year:  Yes    Sleep apnea or symptoms of sleep apnea:  None    Diet:  Regular (no restrictions)    Frequency of exercise:  2-3 days/week    Duration of exercise:  15-30 minutes    Taking medications regularly:  Yes    Medication side effects:  Not applicable    Additional concerns today:  YES          Today's PHQ-2 Score:   PHQ-2 ( 1999 Pfizer) 6/27/2018   Q1: Little interest or pleasure in doing things 0   Q2: Feeling down, depressed or hopeless 0   PHQ-2 Score 0   Q1: Little interest or pleasure in doing things Not at all   Q2: Feeling down, depressed or hopeless Not at all   PHQ-2 Score 0       Abuse: Current or Past(Physical, Sexual or Emotional)- No  Do you feel safe in your environment - Yes    Social History   Substance Use Topics     Smoking status: Never Smoker     Smokeless tobacco: Never Used     Alcohol use No     Alcohol Use 6/27/2018   If you drink alcohol do you typically have greater than 3 drinks per day OR greater than 7 drinks per week? Not Applicable   No flowsheet data found.    Reviewed orders with patient.  Reviewed health maintenance and updated orders accordingly - Yes      Patient over age 50, mutual decision to screen reflected in health maintenance.    Pertinent mammograms are reviewed under the imaging tab.  History of abnormal Pap smear: NO - age 30- 65 PAP every 3 years recommended  PAP / HPV 8/1/2016 11/3/2014 11/18/2011   PAP NIL NIL NIL     Reviewed and updated as needed this visit by clinical staff  Tobacco  Allergies  Meds  Med Hx  Surg Hx  Fam Hx  Soc Hx        Reviewed and updated as needed this visit by Provider          Hearing loss - left ear - stable over time.  Left ear often feels stuffy.   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    Osteoporosis - due for DEXA scan.  Struggles with calcium/vitamin D supplements. In the past, has not tolerated actonel/fosamax well and is hesitant to restart these after a long period of GI distress post use.      Multiple moles, skin pigment changes - hasn't seen dermatology for several years.    Stress incontinence - stable over time.    Remains very busy with caring for her son with high medical needs.    Active lifestyle - both at work and at home.    Has had some struggles with plantar fasciitis this year - has changed shoes with some improvement        Review of Systems   ROS: 10 point ROS neg other than the symptoms noted above in the HPI.       OBJECTIVE:   BP 92/60 (BP Location: Right arm, Patient Position: Right side, Cuff Size: Adult Regular)  Pulse 57  Temp 98.2  F (36.8  C) (Tympanic)  Ht 5' 9.5\" (1.765 m)  Wt 158 lb (71.7 kg)  SpO2 98%  BMI 23 kg/m2  Physical Exam  GENERAL: healthy, alert and no distress  EYES: Eyes grossly normal to inspection, PERRL and conjunctivae and sclerae normal  HENT: ear canals and TM's normal, nose and mouth without ulcers or lesions  NECK: no adenopathy, no asymmetry, masses, or scars and thyroid normal to palpation  RESP: lungs clear to auscultation - no rales, rhonchi or wheezes  BREAST: normal without masses, tenderness or nipple discharge and no palpable axillary masses or adenopathy  CV: regular rate and rhythm, normal S1 S2, no S3 or S4, no murmur, click or rub, no peripheral edema and peripheral pulses strong  ABDOMEN: soft, nontender, no hepatosplenomegaly, no masses and bowel sounds normal  MS: no gross musculoskeletal defects noted, no edema  SKIN: bilateral cheeks and right lateral calf with scattered hyperpigmented macules and patches - some with irregular borders and variable pigment, pearly flesh colored papules over right lateral nare  NEURO: Normal " "strength and tone, mentation intact and speech normal  PSYCH: mentation appears normal, affect normal/bright    Diagnostic Test Results:  pending    ASSESSMENT/PLAN:       ICD-10-CM    1. Routine general medical examination at a health care facility Z00.00 Labs drawn earlier today, await results   2. Asymptomatic postmenopausal status Z78.0 DEXA HIP/PELVIS/SPINE - Future  Has not tolerated oral bisphosphonates well in the past.  Consider endocrinology evaluation for other options based on this year's results         3. Visit for screening mammogram Z12.31 MA Screen Bilateral w/Kwesi   4. Multiple pigmented nevi D22.9 DERMATOLOGY REFERRAL  Recommended full skin exam with dermatology   5. Need for zoster vaccination Z23 ZOSTER VACCINE RECOMBINANT ADJUVANTED IM NJX       COUNSELING:  Special attention given to:        Regular exercise       Healthy diet/nutrition       Vision screening       Immunizations    Vaccinated for: Zoster             Osteoporosis Prevention/Bone Health       Colon cancer screening    BP Readings from Last 1 Encounters:   06/27/18 92/60     Estimated body mass index is 23 kg/(m^2) as calculated from the following:    Height as of this encounter: 5' 9.5\" (1.765 m).    Weight as of this encounter: 158 lb (71.7 kg).           reports that she has never smoked. She has never used smokeless tobacco.      Counseling Resources:  ATP IV Guidelines  Pooled Cohorts Equation Calculator  Breast Cancer Risk Calculator  FRAX Risk Assessment  ICSI Preventive Guidelines  Dietary Guidelines for Americans, 2010  USDA's MyPlate  ASA ProphylaxisAnswers for HPI/ROS submitted by the patient on 6/27/2018   PHQ-2 Score: 0  Lung CA Screening    Maday Anaya MD  [facility]  "

## 2018-06-27 NOTE — PATIENT INSTRUCTIONS
1% hydrocortisone ointment for hemorrhoids    DEXA scan and mammogram    Vitamin D this winter    Call for visit with dermatology     Shingrix vaccine today - come back in about 2 months for #2          Preventive Health Recommendations  Female Ages 50 - 64    Yearly exam: See your health care provider every year in order to  o Review health changes.   o Discuss preventive care.    o Review your medicines if your doctor has prescribed any.      Get a Pap test every three years (unless you have an abnormal result and your provider advises testing more often).    If you get Pap tests with HPV test, you only need to test every 5 years, unless you have an abnormal result.     You do not need a Pap test if your uterus was removed (hysterectomy) and you have not had cancer.    You should be tested each year for STDs (sexually transmitted diseases) if you're at risk.     Have a mammogram every 1 to 2 years.    Have a colonoscopy at age 50, or have a yearly FIT test (stool test). These exams screen for colon cancer.      Have a cholesterol test every 5 years, or more often if advised.    Have a diabetes test (fasting glucose) every three years. If you are at risk for diabetes, you should have this test more often.     If you are at risk for osteoporosis (brittle bone disease), think about having a bone density scan (DEXA).    Shots: Get a flu shot each year. Get a tetanus shot every 10 years.    Nutrition:     Eat at least 5 servings of fruits and vegetables each day.    Eat whole-grain bread, whole-wheat pasta and brown rice instead of white grains and rice.    Get adequate Calcium and Vitamin D.     Lifestyle    Exercise at least 150 minutes a week (30 minutes a day, 5 days a week). This will help you control your weight and prevent disease.    Limit alcohol to one drink per day.    No smoking.     Wear sunscreen to prevent skin cancer.     See your dentist every six months for an exam and cleaning.    See your eye doctor  every 1 to 2 years.

## 2018-06-27 NOTE — MR AVS SNAPSHOT
After Visit Summary   6/27/2018    Latesha Ruiz    MRN: [id number]           Patient Information     Date Of Birth          1960        Visit Information        Provider Department      6/27/2018 10:00 AM Maday Anaya MD [facility]        Today's Diagnoses     Routine general medical examination at a health care facility    -  1    Asymptomatic postmenopausal status        Screening for HIV (human immunodeficiency virus)        Visit for screening mammogram        Multiple pigmented nevi        Need for zoster vaccination          Care Instructions    1% hydrocortisone ointment for hemorrhoids    DEXA scan and mammogram    Vitamin D this winter    Call for visit with dermatology     Shingrix vaccine today - come back in about 2 months for #2          Preventive Health Recommendations  Female Ages 50 - 64    Yearly exam: See your health care provider every year in order to  o Review health changes.   o Discuss preventive care.    o Review your medicines if your doctor has prescribed any.      Get a Pap test every three years (unless you have an abnormal result and your provider advises testing more often).    If you get Pap tests with HPV test, you only need to test every 5 years, unless you have an abnormal result.     You do not need a Pap test if your uterus was removed (hysterectomy) and you have not had cancer.    You should be tested each year for STDs (sexually transmitted diseases) if you're at risk.     Have a mammogram every 1 to 2 years.    Have a colonoscopy at age 50, or have a yearly FIT test (stool test). These exams screen for colon cancer.      Have a cholesterol test every 5 years, or more often if advised.    Have a diabetes test (fasting glucose) every three years. If you are at risk for diabetes, you should have this test more often.     If you are at risk for osteoporosis (brittle bone disease), think about having a bone density scan (DEXA).    Shots:  Get a flu shot each year. Get a tetanus shot every 10 years.    Nutrition:     Eat at least 5 servings of fruits and vegetables each day.    Eat whole-grain bread, whole-wheat pasta and brown rice instead of white grains and rice.    Get adequate Calcium and Vitamin D.     Lifestyle    Exercise at least 150 minutes a week (30 minutes a day, 5 days a week). This will help you control your weight and prevent disease.    Limit alcohol to one drink per day.    No smoking.     Wear sunscreen to prevent skin cancer.     See your dentist every six months for an exam and cleaning.    See your eye doctor every 1 to 2 years.            Follow-ups after your visit        Additional Services     DERMATOLOGY REFERRAL       Your provider has referred you to: [facility]: [facility] [phone]   [email or web address]    Please be aware that coverage of these services is subject to the terms and limitations of your health insurance plan.  Call member services at your health plan with any benefit or coverage questions.      Please bring the following with you to your appointment:    (1) Any X-Rays, CTs or MRIs which have been performed.  Contact the facility where they were done to arrange for  prior to your scheduled appointment.    (2) List of current medications  (3) This referral request   (4) Any documents/labs given to you for this referral                  Follow-up notes from your care team     Return in about 1 year (around 6/27/2019) for Routine Visit.      Future tests that were ordered for you today     Open Future Orders        Priority Expected Expires Ordered    DEXA HIP/PELVIS/SPINE - Future Routine  6/27/2019 6/27/2018    MA Screen Bilateral w/Kwesi Routine  6/27/2019 6/27/2018            Who to contact     If you have questions or need follow up information about today's clinic visit or your schedule please contact [facility] directly at [phone].  Normal or  "non-critical lab and imaging results will be communicated to you by MyChart, letter or phone within 4 business days after the clinic has received the results. If you do not hear from us within 7 days, please contact the clinic through [employer] or phone. If you have a critical or abnormal lab result, we will notify you by phone as soon as possible.  Submit refill requests through [employer] or call your pharmacy and they will forward the refill request to us. Please allow 3 business days for your refill to be completed.          Additional Information About Your Visit        [employer] Information     [employer] gives you secure access to your electronic health record. If you see a primary care provider, you can also send messages to your care team and make appointments. If you have questions, please call your primary care clinic.  If you do not have a primary care provider, please call [phone] and they will assist you.        Care EveryWhere ID     This is your Care EveryWhere ID. This could be used by other organizations to access your [place] medical records  [id number]        Your Vitals Were     Pulse Temperature Height Pulse Oximetry BMI (Body Mass Index)       57 98.2  F (36.8  C) (Tympanic) 5' 9.5\" (1.765 m) 98% 23 kg/m2        Blood Pressure from Last 3 Encounters:   06/27/18 92/60   09/12/17 115/60   09/05/17 110/80    Weight from Last 3 Encounters:   06/27/18 158 lb (71.7 kg)   09/12/17 157 lb (71.2 kg)   09/05/17 157 lb 1.6 oz (71.3 kg)              We Performed the Following     DERMATOLOGY REFERRAL     ZOSTER VACCINE RECOMBINANT ADJUVANTED IM NJX        Primary Care Provider Office Phone # Fax #    Maday Anaya [phone] [address] [place] [address]        Equal Access to Services     SALIMA CHOI : Gosia Barahona, devin lucas, guillaume telles. So [facility] [place] [phone].    ATENCIÓN: Si " kailee haddad, tiene a martinez disposición servicios gratuitos de asistencia lingüística. Licha abarca [phone].    We comply with applicable federal civil rights laws and [place] laws. We do not discriminate on the basis of race, color, national origin, age, disability, sex, sexual orientation, or gender identity.            Thank you!     Thank you for choosing [facility]  for your care. Our goal is always to provide you with excellent care. Hearing back from our patients is one way we can continue to improve our services. Please take a few minutes to complete the written survey that you may receive in the mail after your visit with us. Thank you!             Your Updated Medication List - Protect others around you: Learn how to safely use, store and throw away your medicines at www.disposemymeds.org.      Notice  As of 6/27/2018 10:59 AM    You have not been prescribed any medications.

## 2018-06-28 ENCOUNTER — MYC MEDICAL ADVICE (OUTPATIENT)
Dept: PEDIATRICS | Facility: CLINIC | Age: 58
End: 2018-06-28

## 2018-07-26 ENCOUNTER — RADIANT APPOINTMENT (OUTPATIENT)
Dept: BONE DENSITY | Facility: CLINIC | Age: 58
End: 2018-07-26
Attending: PEDIATRICS
Payer: COMMERCIAL

## 2018-07-26 DIAGNOSIS — Z78.0 ASYMPTOMATIC POSTMENOPAUSAL STATUS: ICD-10-CM

## 2018-07-26 PROCEDURE — 77080 DXA BONE DENSITY AXIAL: CPT | Performed by: INTERNAL MEDICINE

## 2018-07-31 ENCOUNTER — HOSPITAL ENCOUNTER (OUTPATIENT)
Dept: MAMMOGRAPHY | Facility: CLINIC | Age: 58
Discharge: HOME | End: 2018-07-31
Attending: PEDIATRICS | Admitting: PEDIATRICS
Payer: COMMERCIAL

## 2018-07-31 DIAGNOSIS — Z12.31 VISIT FOR SCREENING MAMMOGRAM: ICD-10-CM

## 2018-07-31 PROCEDURE — 77063 BREAST TOMOSYNTHESIS BI: CPT

## 2018-08-03 ENCOUNTER — TRANSFERRED RECORDS (OUTPATIENT)
Dept: HEALTH INFORMATION MANAGEMENT | Facility: CLINIC | Age: 58
End: 2018-08-03

## 2018-09-21 ENCOUNTER — ALLIED HEALTH/NURSE VISIT (OUTPATIENT)
Dept: NURSING | Facility: CLINIC | Age: 58
End: 2018-09-21
Payer: COMMERCIAL

## 2018-09-21 DIAGNOSIS — R79.89 ELEVATED LFTS: ICD-10-CM

## 2018-09-21 DIAGNOSIS — Z23 NEED FOR VACCINATION: Primary | ICD-10-CM

## 2018-09-21 DIAGNOSIS — E55.9 VITAMIN D DEFICIENCY: ICD-10-CM

## 2018-09-21 PROCEDURE — 99207 ZZC NO CHARGE NURSE ONLY: CPT

## 2018-09-21 PROCEDURE — 82306 VITAMIN D 25 HYDROXY: CPT | Performed by: PEDIATRICS

## 2018-09-21 PROCEDURE — 90750 HZV VACC RECOMBINANT IM: CPT

## 2018-09-21 PROCEDURE — 36415 COLL VENOUS BLD VENIPUNCTURE: CPT | Performed by: PEDIATRICS

## 2018-09-21 PROCEDURE — 90471 IMMUNIZATION ADMIN: CPT

## 2018-09-21 PROCEDURE — 80053 COMPREHEN METABOLIC PANEL: CPT | Performed by: PEDIATRICS

## 2018-09-22 LAB
ALBUMIN SERPL-MCNC: 3.8 G/DL (ref 3.4–5)
ALP SERPL-CCNC: 53 U/L (ref 40–150)
ALT SERPL W P-5'-P-CCNC: 33 U/L (ref 0–50)
ANION GAP SERPL CALCULATED.3IONS-SCNC: 7 MMOL/L (ref 3–14)
AST SERPL W P-5'-P-CCNC: 19 U/L (ref 0–45)
BILIRUB SERPL-MCNC: 0.4 MG/DL (ref 0.2–1.3)
BUN SERPL-MCNC: 15 MG/DL (ref 7–30)
CALCIUM SERPL-MCNC: 8.3 MG/DL (ref 8.5–10.1)
CHLORIDE SERPL-SCNC: 106 MMOL/L (ref 94–109)
CO2 SERPL-SCNC: 27 MMOL/L (ref 20–32)
CREAT SERPL-MCNC: 0.76 MG/DL (ref 0.52–1.04)
GFR SERPL CREATININE-BSD FRML MDRD: 77 ML/MIN/1.7M2
GLUCOSE SERPL-MCNC: 84 MG/DL (ref 70–99)
POTASSIUM SERPL-SCNC: 3.6 MMOL/L (ref 3.4–5.3)
PROT SERPL-MCNC: 6.9 G/DL (ref 6.8–8.8)
SODIUM SERPL-SCNC: 140 MMOL/L (ref 133–144)

## 2018-09-24 LAB — DEPRECATED CALCIDIOL+CALCIFEROL SERPL-MC: 29 UG/L (ref 20–75)

## 2018-11-09 ENCOUNTER — OFFICE VISIT (OUTPATIENT)
Dept: URGENT CARE | Facility: URGENT CARE | Age: 58
End: 2018-11-09
Payer: COMMERCIAL

## 2018-11-09 VITALS
SYSTOLIC BLOOD PRESSURE: 96 MMHG | TEMPERATURE: 98.2 F | OXYGEN SATURATION: 97 % | BODY MASS INDEX: 23.38 KG/M2 | WEIGHT: 160.6 LBS | HEART RATE: 66 BPM | DIASTOLIC BLOOD PRESSURE: 62 MMHG

## 2018-11-09 DIAGNOSIS — R07.0 THROAT PAIN: Primary | ICD-10-CM

## 2018-11-09 LAB
DEPRECATED S PYO AG THROAT QL EIA: NORMAL
SPECIMEN SOURCE: NORMAL

## 2018-11-09 PROCEDURE — 87081 CULTURE SCREEN ONLY: CPT | Performed by: FAMILY MEDICINE

## 2018-11-09 PROCEDURE — 99213 OFFICE O/P EST LOW 20 MIN: CPT | Performed by: FAMILY MEDICINE

## 2018-11-09 PROCEDURE — 87880 STREP A ASSAY W/OPTIC: CPT | Performed by: PHYSICIAN ASSISTANT

## 2018-11-09 NOTE — MR AVS SNAPSHOT
After Visit Summary   11/9/2018    Latesha Ruiz    MRN: [id number]           Patient Information     Date Of Birth          1960        Visit Information        Provider Department      11/9/2018 4:40 PM Cheri Zamora MD [facility]        Today's Diagnoses     Throat pain    -  1       Follow-ups after your visit        Follow-up notes from your care team     Return in about 1 week (around 11/16/2018), or if symptoms worsen or fail to improve.      Who to contact     If you have questions or need follow up information about today's clinic visit or your schedule please contact [facility] directly at [phone].  Normal or non-critical lab and imaging results will be communicated to you by MyChart, letter or phone within 4 business days after the clinic has received the results. If you do not hear from us within 7 days, please contact the clinic through [employer] or phone. If you have a critical or abnormal lab result, we will notify you by phone as soon as possible.  Submit refill requests through [employer] or call your pharmacy and they will forward the refill request to us. Please allow 3 business days for your refill to be completed.          Additional Information About Your Visit        MyChart Information     [employer] gives you secure access to your electronic health record. If you see a primary care provider, you can also send messages to your care team and make appointments. If you have questions, please call your primary care clinic.  If you do not have a primary care provider, please call [phone] and they will assist you.        Care EveryWhere ID     This is your Care EveryWhere ID. This could be used by other organizations to access your [place] medical records  [id number]        Your Vitals Were     Pulse Temperature Pulse Oximetry BMI (Body Mass Index)          66 98.2  F (36.8  C) (Oral) 97% 23.38 kg/m2         Blood  Pressure from Last 3 Encounters:   11/09/18 96/62   06/27/18 92/60   09/12/17 115/60    Weight from Last 3 Encounters:   11/09/18 160 lb 9.6 oz (72.8 kg)   06/27/18 158 lb (71.7 kg)   09/12/17 157 lb (71.2 kg)              We Performed the Following     Beta strep group A culture     Strep, Rapid Screen        Primary Care Provider Office Phone # Fax #    Maday Anaya [phone] [address] [place] [address]        Equal Access to Services     [facility]: Hadii aad ku hadasho Soomaali, waaxda luqadaha, qaybta kaalmada dale, guillaume menjivar . So [facility] [phone].    ATENCIÓN: Si habla español, tiene a martinez disposición servicios gratuitos de asistencia lingüística. [facility] [phone].    We comply with applicable federal civil rights laws and [place] laws. We do not discriminate on the basis of race, color, national origin, age, disability, sex, sexual orientation, or gender identity.            Thank you!     Thank you for choosing [facility] [place] [facility]  for your care. Our goal is always to provide you with excellent care. Hearing back from our patients is one way we can continue to improve our services. Please take a few minutes to complete the written survey that you may receive in the mail after your visit with us. Thank you!             Your Updated Medication List - Protect others around you: Learn how to safely use, store and throw away your medicines at www.disposemymeds.org.          This list is accurate as of 11/9/18  5:35 PM.  Always use your most recent med list.                   Brand Name Dispense Instructions for use Diagnosis    cholecalciferol 52046 units capsule    VITAMIN D3    8 capsule    Take 1 capsule (50,000 Units) by mouth once a week    Vitamin D deficiency

## 2018-11-09 NOTE — PROGRESS NOTES
SUBJECTIVE:   Latesha Ruiz is a 58 year old female who presents to clinic today for the following health issues:    HPI     Here with son with URI symptoms.  Is elementary  with multiple sick exposures.  Has had scratchy throat.  No fever or other URI symptoms-- wanted to make sure it was not strep-- traveling next weekend.    Problem list and histories reviewed & adjusted, as indicated.  Additional history: as documented        Patient Active Problem List   Diagnosis     Allergic rhinitis     Osteoporosis     RUSSEL (generalized anxiety disorder)     CARDIOVASCULAR SCREENING; LDL GOAL LESS THAN 160     Lactose intolerance     Stress incontinence     Sebaceous hyperplasia of face     AK (actinic keratosis)     Vitamin D deficiency     Past Surgical History:   Procedure Laterality Date     ABDOMEN SURGERY  , ,     3 C-sections     BIOPSY  , ?    breast lumps - benign     BREAST SURGERY  ,     benign lumps     C APPENDECTOMY       C  DELIVERY ONLY      Primary C/S - FTP     C  DELIVERY ONLY      Repeat C/S     C  DELIVERY ONLY      Repeat C/S, TL     C NONSPECIFIC PROCEDURE      Breast Bx - Right     COLONOSCOPY  ,        Social History   Substance Use Topics     Smoking status: Never Smoker     Smokeless tobacco: Never Used     Alcohol use No     Family History   Problem Relation Age of Onset     Psychotic Disorder Mother      anxiety     Thyroid Disease Mother      Born 1921. Has CAD, two stents placed.      C.A.D. Mother      Had MI/stents x 3 at age 84.     Cancer Mother      BCC of face     Coronary Artery Disease Mother      at age 84 - stents     Other Cancer Mother      lung cancer - age 92 non-smoker     Family History Negative Father      Born 191. Resides in SNF. Has spinal stenosis, difficulty ambulating, also short term memory loss.      Prostate Cancer Father      age 65 or so     Breast Cancer Other       GASTROINTESTINAL DISEASE Son      gastroesophageal reflux     Neurologic Disorder Son      seizure disorder     Family History Negative Sister      Brief period of anxiety, born      Thyroid Disease Sister      Non cancerous growth on thyroid removed     Psychotic Disorder Brother      anxiety/Born 194     Respiratory Child      mild asthma/mild asthma     Cancer Paternal Grandfather      ?stomach CA (was a digestive organ)     Family History Negative Maternal Grandfather       age 74, pulmonary embolism. Had colitis     Breast Cancer Maternal Grandmother      Depression Brother      Thyroid Disease Sister          Current Outpatient Prescriptions   Medication Sig Dispense Refill     cholecalciferol (VITAMIN D3) 06403 units capsule Take 1 capsule (50,000 Units) by mouth once a week 8 capsule 0     Allergies   Allergen Reactions     Asa [Aspirin]      Codeine Nausea and Vomiting     Erythromycin Nausea     Penicillins Hives     Recent Labs   Lab Test  18   1614  18   0909  16   0901  14   0919  13   0905   LDL   --   113*  132*  134*  112   HDL   --   51  51  53  68   TRIG   --   204*  207*  227*  169*   ALT  33  59*  42  37  36   CR  0.76  0.77  0.78  0.83  0.76   GFRESTIMATED  77  76  76  71  79   GFRESTBLACK  >90  >90  >90  African American GFR Calc    86  >90   POTASSIUM  3.6  3.9  3.9  4.0  4.1   TSH   --    --    --   0.69  0.80      BP Readings from Last 3 Encounters:   18 96/62   18 92/60   17 115/60    Wt Readings from Last 3 Encounters:   18 160 lb 9.6 oz (72.8 kg)   18 158 lb (71.7 kg)   17 157 lb (71.2 kg)                    ROS:  Constitutional, HEENT, cardiovascular, pulmonary, gi and gu systems are negative, except as otherwise noted.    OBJECTIVE:     BP 96/62 (BP Location: Right arm, Patient Position: Chair, Cuff Size: Adult Regular)  Pulse 66  Temp 98.2  F (36.8  C) (Oral)  Wt 160 lb 9.6 oz (72.8 kg)  SpO2 97%  BMI 23.38  kg/m2  Body mass index is 23.38 kg/(m^2).  GENERAL: healthy, alert and no distress  EYES: Eyes grossly normal to inspection, PERRL and conjunctivae and sclerae normal  HENT: ear canals and TM's normal, nose and mouth without ulcers or lesions, no erythema or exudate of posterior pharynx  NECK: no adenopathy, no asymmetry, masses, or scars and thyroid normal to palpation  MS: no gross musculoskeletal defects noted, no edema  SKIN: no suspicious lesions or rashes  NEURO: Normal strength and tone, mentation intact and speech normal  PSYCH: mentation appears normal, affect normal/bright    Diagnostic Test Results:  Results for orders placed or performed in visit on 11/09/18 (from the past 24 hour(s))   Strep, Rapid Screen   Result Value Ref Range    Specimen Description Throat     Rapid Strep A Screen       NEGATIVE: No Group A streptococcal antigen detected by immunoassay, await culture report.       ASSESSMENT/PLAN:     1. Throat pain    - Strep, Rapid Screen  - Beta strep group A culture    Recommend continued supportive cares including lozenges, ibuprofen/Tylenol prn.  TC pending.  Follow-up if no change or worsening symptoms prn,    Cheri Zamora MD  [facility]

## 2018-11-11 LAB
BACTERIA SPEC CULT: NORMAL
SPECIMEN SOURCE: NORMAL

## 2019-04-06 ENCOUNTER — OFFICE VISIT (OUTPATIENT)
Dept: URGENT CARE | Facility: URGENT CARE | Age: 59
End: 2019-04-06
Payer: COMMERCIAL

## 2019-04-06 VITALS
WEIGHT: 158 LBS | SYSTOLIC BLOOD PRESSURE: 96 MMHG | TEMPERATURE: 98.7 F | HEART RATE: 79 BPM | BODY MASS INDEX: 23 KG/M2 | DIASTOLIC BLOOD PRESSURE: 58 MMHG | OXYGEN SATURATION: 97 %

## 2019-04-06 DIAGNOSIS — R07.0 THROAT PAIN: Primary | ICD-10-CM

## 2019-04-06 LAB
DEPRECATED S PYO AG THROAT QL EIA: NORMAL
SPECIMEN SOURCE: NORMAL

## 2019-04-06 PROCEDURE — 87081 CULTURE SCREEN ONLY: CPT | Performed by: PHYSICIAN ASSISTANT

## 2019-04-06 PROCEDURE — 99213 OFFICE O/P EST LOW 20 MIN: CPT | Performed by: PHYSICIAN ASSISTANT

## 2019-04-06 PROCEDURE — 87880 STREP A ASSAY W/OPTIC: CPT | Performed by: PHYSICIAN ASSISTANT

## 2019-04-06 NOTE — PROGRESS NOTES
SUBJECTIVE:   Latesha Ruiz is a 59 year old female presenting with a chief complaint of sore throat.  Onset of symptoms was 3 day(s) ago.  Course of illness is worsening (especially last night); now stable.     Severity moderate  Current and Associated symptoms: ear pain on the left side  Treatment measures tried include: salt water gargles, fluids and rest.   Predisposing factors include: works as a  (Elementary school)    Thinks that throat pain could be due to fatigue from singing as a ; no fevers, chills, body aches, fatigue, shortness of breath, chest pain, etc.     Past Medical History:   Diagnosis Date     AK (actinic keratosis) 6/17/2013     Allergic rhinitis, cause unspecified      Arthritis 2013?    mild in thumbs, jaw     Closed fracture of metacarpal bone(s), site unspecified 12/04    Right 5th metacarpal fx--caught in a door     Closed fracture of rib(s), unspecified ~1995     Contact dermatitis and other eczema, due to unspecified cause      Disorder of bone and cartilage, unspecified ~4/2000    T-score -2.00 in L-spine in 5/03     Lumbago      Premature menopause     Elevated FSH/LH in 1993--normal brain MRI 12/2001     Current Outpatient Medications   Medication Sig Dispense Refill     cholecalciferol (VITAMIN D3) 79358 units capsule Take 1 capsule (50,000 Units) by mouth once a week 8 capsule 0     Social History     Tobacco Use     Smoking status: Never Smoker     Smokeless tobacco: Never Used   Substance Use Topics     Alcohol use: No       ROS:  10-point review of systems negative except as stated above.    OBJECTIVE:  BP 96/58 (BP Location: Right arm)   Pulse 79   Temp 98.7  F (37.1  C) (Tympanic)   Wt 71.7 kg (158 lb)   SpO2 97%   BMI 23.00 kg/m    GENERAL APPEARANCE: healthy, alert and no distress  EYES: EOMI,  PERRL, conjunctiva clear  HENT: TM's normal bilaterally, mild pharyngeal erythema and postnasal drainage, no maxillary or frontal sinus tenderness,  no tonsillar hypertrophy   NECK: mild right-sided cervical adenopathy  RESP: breathing regular & unlabored  CV: regular rates and rhythm  NEURO: Normal strength and tone, sensory exam grossly normal,  normal speech and mentation  SKIN: no suspicious lesions or rashes  PSYCH: mentation appears normal    Results for orders placed or performed in visit on 04/06/19   Rapid strep screen   Result Value Ref Range    Specimen Description Throat     Rapid Strep A Screen       NEGATIVE: No Group A streptococcal antigen detected by immunoassay, await culture report.        ASSESSMENT:  Viral pharyngitis (strep negative)    PLAN:  Tylenol, Ibuprofen, Fluids and Rest - can use OTC meds for symptom relief (Chloraseptic spray, throat lozenges, etc.). Return if new fevers, shortness of breath, inability to maintain PO intake, etc.     The above evaluation was completed by Mikaela Cabrera PA-C and written by WYATT Rivas student.    I have reviewed the ROS and PSFH documented by the student.  I performed the pertinent history, exam and assessment and plan components as documented above.

## 2019-04-07 LAB
BACTERIA SPEC CULT: NORMAL
SPECIMEN SOURCE: NORMAL

## 2019-05-01 ENCOUNTER — OFFICE VISIT (OUTPATIENT)
Dept: URGENT CARE | Facility: URGENT CARE | Age: 59
End: 2019-05-01
Payer: COMMERCIAL

## 2019-05-01 VITALS
BODY MASS INDEX: 22.63 KG/M2 | TEMPERATURE: 97.1 F | SYSTOLIC BLOOD PRESSURE: 100 MMHG | WEIGHT: 155.5 LBS | HEART RATE: 62 BPM | OXYGEN SATURATION: 98 % | DIASTOLIC BLOOD PRESSURE: 60 MMHG

## 2019-05-01 DIAGNOSIS — H92.02 OTALGIA, LEFT: ICD-10-CM

## 2019-05-01 DIAGNOSIS — H81.10 BENIGN PAROXYSMAL POSITIONAL VERTIGO, UNSPECIFIED LATERALITY: Primary | ICD-10-CM

## 2019-05-01 PROCEDURE — 99213 OFFICE O/P EST LOW 20 MIN: CPT | Performed by: FAMILY MEDICINE

## 2019-05-01 RX ORDER — NEOMYCIN SULFATE, POLYMYXIN B SULFATE, HYDROCORTISONE 3.5; 10000; 1 MG/ML; [USP'U]/ML; MG/ML
3 SOLUTION/ DROPS AURICULAR (OTIC) 3 TIMES DAILY
Qty: 10 ML | Refills: 0 | Status: SHIPPED | OUTPATIENT
Start: 2019-05-01 | End: 2019-09-27

## 2019-05-02 NOTE — PROGRESS NOTES
SUBJECTIVE:   Latesha Ruiz is a 59 year old female presenting with a chief complaint of left ear plugged, intermittent dizziness with certain head movement.  No tinnitus, no URI symptoms, no fever.  Dizziness/vertigo very brief but has occurred more frequently over the past 2 weeks.  Onset of symptoms was 2 week(s) ago.  Course of illness is same.    Severity mild  Current and Associated symptoms:   Treatment measures tried include None tried.  Predisposing factors include allergic rhinitis.    Patient worried that may have had a stroke or a heart attack.    Past Medical History:   Diagnosis Date     AK (actinic keratosis) 6/17/2013     Allergic rhinitis, cause unspecified      Arthritis 2013?    mild in thumbs, jaw     Closed fracture of metacarpal bone(s), site unspecified 12/04    Right 5th metacarpal fx--caught in a door     Closed fracture of rib(s), unspecified ~1995     Contact dermatitis and other eczema, due to unspecified cause      Disorder of bone and cartilage, unspecified ~4/2000    T-score -2.00 in L-spine in 5/03     Lumbago      Premature menopause     Elevated FSH/LH in 1993--normal brain MRI 12/2001     Current Outpatient Medications   Medication Sig Dispense Refill     cholecalciferol (VITAMIN D3) 60143 units capsule Take 1 capsule (50,000 Units) by mouth once a week 8 capsule 0     Social History     Tobacco Use     Smoking status: Never Smoker     Smokeless tobacco: Never Used   Substance Use Topics     Alcohol use: No       ROS:  Review of systems negative except as stated above.    OBJECTIVE:  /60   Pulse 62   Temp 97.1  F (36.2  C)   Wt 70.5 kg (155 lb 8 oz)   SpO2 98%   BMI 22.63 kg/m    GENERAL APPEARANCE: healthy, alert and no distress  EYES: EOMI,  PERRL, conjunctiva clear  HENT: ear canals with faint irritation, bilateral TM's normal.  Nose and mouth without ulcers, erythema or lesions  RESP: lungs clear to auscultation - no rales, rhonchi or wheezes  CV: regular rates  and rhythm, normal S1 S2, no murmur noted  SKIN: no suspicious lesions or rashes  PSYCH: mentation appears normal and affect normal/bright    ASSESSMENT/PLAN:  (H81.10) Benign paroxysmal positional vertigo, unspecified laterality  (primary encounter diagnosis)  Comment: brief  Plan: monitor    (H92.02) Otalgia, left  Comment: eustachian tube dysfunction  Plan: neomycin-polymyxin-hydrocortisone (CORTISPORIN)        3.5-20041-3 otic solution            Reassurance given, patient appears well, no acute distress.  Discussed treatment for probable eustachian tube dysfunction causing ear symptoms - okay for sudafed for now, can consider flonase if more congestion.    Discussed that vertigo/dizziness seems mild and brief, would hold off on meclizine for now.  If worsens, then may benefit from medication and ENT evaluation.    Discussed possible mild otitis externa, RX cortisporin otic drops given to use if develops more itchiness in ear canal, minimize Q-tip usage.    Follow up with primary provider if no improvement of symptoms in 2 weeks.    Brody James MD  May 1, 2019 9:45 PM

## 2019-05-02 NOTE — PATIENT INSTRUCTIONS
Minimize head movements that causes vertigo/dizziness.    Take sudafed 60 mg every 6 hours as needed to help with eustachian tube dysfunction.  Consider flonase to help with congestion and eustachian tube dysfunction.    Consider ear drops if ear symptoms more itchiness (swimmer's ear)    Patient Education     Inner Ear Problems: Causes of Dizziness (Vertigo)       Benign positional vertigo (BPV)  This is the most common cause of vertigo. BPV is also called benign positional paroxysmal vertigo (BPPV). It happens when crystals in the ear canals shift into the wrong place. Vertigo usually occurs when you move your head in a certain way. This can happen when turning in bed, bending, or looking up. Because BPV comes on quickly, you should think about if you are safe to drive or do other tasks that need your full attention.  BPV:    Causes vertigo that last for seconds. Vertigo can occur several times a day, depending on body position.    Doesn t cause hearing loss    Often goes away on its own. But it but may go away sooner with treatment.  Infection or inflammation  Sometimes the semicircular canals swell and send incorrect balance signals. This problem may be caused by a viral infection. Depending on the cause, your hearing can be affected (labyrinthitis). Or your hearing can remain normal (neuronitis).  Infection or inflammation:    Causes vertigo that lasts for hours or days. The first episode is usually the worst.    Can cause hearing loss    Often goes away on its own. But it may go away sooner with treatment.  You may need vestibular rehabilitation if you have balance problems that don't go away.  Meniere s disease  This condition is uncommon. It happens when there is too much fluid in the ear canals. This causes increased pressure and swelling. It affects balance and hearing signals.  Meniere s disease may:    Cause vertigo that last for hours    Cause hearing problems that come and go. The problems are usually  in one ear and get worse over time.    Cause buzzing or ringing in the ears (tinnitus)    Cause a feeling of fullness or pressure in the ear    Cause any of these symptoms: vertigo, hearing loss, tinnitus, or ear fullness to last a lifetime  Date Last Reviewed: 11/1/2016 2000-2018 [employer]. [address] [place] [id number]. All rights reserved. This information is not intended as a substitute for professional medical care. Always follow your healthcare professional's instructions.           Patient Education     Earache, No Infection (Adult)  Earaches can happen without an infection. This occurs when air and fluid build up behind the eardrum causing a feeling of fullness and discomfort and reduced hearing. This is called otitis media with effusion (OME) or serous otitis media. It means there is fluid in the middle ear. It is not the same as acute otitis media, which is typically from infection.  OME can happen when you have a cold if congestion blocks the passage that drains the middle ear. This passage is called the eustachian tube. OME may also occur with nasal allergies or after a bacterial middle ear infection.    The pain or discomfort may come and go. You may hear clicking or popping sounds when you chew or swallow. You may feel that your balance is off. Or you may hear ringing in the ear.  It often takes from several weeks up to 3 months for the fluid to clear on its own. Oral pain relievers and ear drops help if there is pain. Decongestants and antihistamines sometimes help. Antibiotics don't help since there is no infection. Your doctor may prescribe a nasal spray to help reduce swelling in the nose and eustachian tube. This can allow the ear to drain.  If your OME doesn't improve after 3 months, surgery may be used to drain the fluid and insert a small tube in the eardrum to allow continued drainage.  Because the middle ear fluid can become infected, it is important to watch  for signs of an ear infection which may develop later. These signs include increased ear pain, fever, or drainage from the ear.  Home care  The following guidelines will help you care for yourself at home:    You may use over-the-counter medicine as directed to control pain, unless another medicine was prescribed. If you have chronic liver or kidney disease or ever had a stomach ulcer or GI bleeding, talk with your doctor before using these medicines. Aspirin should never be used in anyone under 18 years of age who is ill with a fever. It may cause severe liver damage.    You may use over-the-counter decongestants such as phenylephrine or pseudoephedrine. But they are not always helpful. Don't use nasal spray decongestants more than 3 days. Longer use can make congestion worse. Prescription nasal sprays from your doctor don't typically have those restrictions.    Antihistamines may help if you are also having allergy symptoms.    You may use medicines such as guaifenesin to thin mucus and promote drainage.  Follow-up care  Follow up with your healthcare provider or as advised if you are not feeling better after 3 days.  When to seek medical advice  Call your healthcare provider right away if any of the following occur:    Your ear pain gets worse or does not start to improve     Fever of 100.4 F (38 C) or higher, or as directed by your healthcare provider    Fluid or blood draining from the ear    Headache or sinus pain    Stiff neck    Unusual drowsiness or confusion  Date Last Reviewed: 10/1/2016    [id number] [employer]. [address] [place] [address], [place] [address]. All rights reserved. This information is not intended as a substitute for professional medical care. Always follow your healthcare professional's instructions.

## 2019-06-24 ENCOUNTER — OFFICE VISIT (OUTPATIENT)
Dept: URGENT CARE | Facility: URGENT CARE | Age: 59
End: 2019-06-24
Payer: COMMERCIAL

## 2019-06-24 VITALS
SYSTOLIC BLOOD PRESSURE: 116 MMHG | HEIGHT: 70 IN | HEART RATE: 78 BPM | BODY MASS INDEX: 22.19 KG/M2 | WEIGHT: 155 LBS | DIASTOLIC BLOOD PRESSURE: 62 MMHG | TEMPERATURE: 97.9 F | RESPIRATION RATE: 16 BRPM | OXYGEN SATURATION: 97 %

## 2019-06-24 DIAGNOSIS — T14.8XXA PUNCTURE WOUND: Primary | ICD-10-CM

## 2019-06-24 PROCEDURE — 90715 TDAP VACCINE 7 YRS/> IM: CPT | Performed by: PHYSICIAN ASSISTANT

## 2019-06-24 PROCEDURE — 90471 IMMUNIZATION ADMIN: CPT | Performed by: PHYSICIAN ASSISTANT

## 2019-06-24 PROCEDURE — 99212 OFFICE O/P EST SF 10 MIN: CPT | Mod: 25 | Performed by: PHYSICIAN ASSISTANT

## 2019-06-24 ASSESSMENT — ENCOUNTER SYMPTOMS
FEVER: 0
NUMBNESS: 0
CARDIOVASCULAR NEGATIVE: 1
CHILLS: 0
WOUND: 1
WEAKNESS: 0
RESPIRATORY NEGATIVE: 1

## 2019-06-24 ASSESSMENT — MIFFLIN-ST. JEOR: SCORE: 1345.39

## 2019-06-25 NOTE — PATIENT INSTRUCTIONS
Signs and symptoms to monitor for redness, swollen, purulent drainage, fever, chills, or worsening pain.

## 2019-06-25 NOTE — PROGRESS NOTES
SUBJECTIVE:   Latesha Ruiz is a 59 year old female presenting with a chief complaint of   Chief Complaint   Patient presents with     Laceration     puncture on middle left finger, not bleeding x this morning, may need to TDAP       She is an established patient of [facility].    Laceration    Mechanism of injury:   History provided by: Patient  Time of injury was 8 hours(s) ago.    This is a non-work related injury.    Associated symptoms: Denies numbness, weakness, or loss of function    Last tetanus booster within 10 years: Yes (2012)    LACERATION EXAM:   Size of laceration:  0.1 centimeters  Characteristics of the laceration: clean  Depth of laceration: superficial  Tendon function intact: Yes  Sensation to light touch intact: Yes  Pulses/capillary refill intact: Yes  Foreign body: No    Picture included in patient's chart: no      Review of Systems   Constitutional: Negative for chills and fever.   Respiratory: Negative.    Cardiovascular: Negative.    Skin: Positive for wound.   Neurological: Negative for weakness and numbness.       Past Medical History:   Diagnosis Date     AK (actinic keratosis) 6/17/2013     Allergic rhinitis, cause unspecified      Arthritis 2013?    mild in thumbs, jaw     Closed fracture of metacarpal bone(s), site unspecified 12/04    Right 5th metacarpal fx--caught in a door     Closed fracture of rib(s), unspecified ~1995     Contact dermatitis and other eczema, due to unspecified cause      Disorder of bone and cartilage, unspecified ~4/2000    T-score -2.00 in L-spine in 5/03     Lumbago      Premature menopause     Elevated FSH/LH in 1993--normal brain MRI 12/2001     Family History   Problem Relation Age of Onset     Psychotic Disorder Mother         anxiety     Thyroid Disease Mother         Born 1922. Has CAD, two stents placed.      C.A.D. Mother         Had MI/stents x 3 at age 84.     Cancer Mother         BCC of face     Coronary Artery Disease Mother         at age 84  "- stents     Other Cancer Mother         lung cancer - age 92 non-smoker     Family History Negative Father         Born [birth year]. Resides in SNF. Has spinal stenosis, difficulty ambulating, also short term memory loss.      Prostate Cancer Father         age 65 or so     Breast Cancer Other      Gastrointestinal Disease Son         gastroesophageal reflux     Neurologic Disorder Son         seizure disorder     Family History Negative Sister         Brief period of anxiety, born      Thyroid Disease Sister         Non cancerous growth on thyroid removed     Psychotic Disorder Brother         anxiety/Born      Respiratory Child         mild asthma/mild asthma     Cancer Paternal Grandfather         ?stomach CA (was a digestive organ)     Family History Negative Maternal Grandfather          age 74, pulmonary embolism. Had colitis     Breast Cancer Maternal Grandmother      Depression Brother      Thyroid Disease Sister      Current Outpatient Medications   Medication Sig Dispense Refill     cholecalciferol (VITAMIN D3) 35448 units capsule Take 1 capsule (50,000 Units) by mouth once a week 8 capsule 0     Social History     Tobacco Use     Smoking status: Never Smoker     Smokeless tobacco: Never Used   Substance Use Topics     Alcohol use: No       OBJECTIVE  /62 (BP Location: Right arm, Patient Position: Chair, Cuff Size: Adult Regular)   Pulse 78   Temp 97.9  F (36.6  C) (Oral)   Resp 16   Ht 1.765 m (5' 9.5\")   Wt 70.3 kg (155 lb)   SpO2 97%   BMI 22.56 kg/m      Physical Exam   Constitutional: She is oriented to person, place, and time. She appears well-developed and well-nourished. No distress.   Pulmonary/Chest: Effort normal.   Neurological: She is alert and oriented to person, place, and time.   Skin: Skin is warm and dry.        Psychiatric: She has a normal mood and affect.   Nursing note and vitals reviewed.       Labs:  No results found for this or any previous visit (from the past " 24 hour(s)).    X-Ray was not done.    ASSESSMENT:      ICD-10-CM    1. Puncture wound T14.8XXA ADMIN 1st VACCINE        Medical Decision Making:    Differential Diagnosis: laceration    Serious Comorbid Conditions:  Adult:  None    PLAN:    Laceration:  Keep wound clean. Patient will return immediately if they experience redness around the laceration, drainage, worsening pain, or fever.    We discussed indications for tetanus booster. Tetanus booster given. Patient discharged in stable condition.       Followup:    If not improving or if condition worsens, follow up with your Primary Care Provider    Patient Instructions   Signs and symptoms to monitor for redness, swollen, purulent drainage, fever, chills, or worsening pain.

## 2019-06-25 NOTE — PROGRESS NOTES
MEDICATION: Tdap  ROUTE: IM  SITE: Deltoid - Left  DOSE: .5ml  LOT #:[id number]  :  [employer]  EXPIRATION DATE:  14mar21  NDC#: 19725-852-94  Injection administered @ 7:20p pt. Waited 15 min w/ no side affects  Tiffany Navarro CMA on 6/24/2019 at 7:37 PM

## 2019-09-26 ASSESSMENT — ENCOUNTER SYMPTOMS
CONSTIPATION: 0
DIZZINESS: 0
DIARRHEA: 0
PALPITATIONS: 0
COUGH: 0
ARTHRALGIAS: 0
PARESTHESIAS: 0
EYE PAIN: 0
DYSURIA: 0
HEADACHES: 0
WEAKNESS: 0
NERVOUS/ANXIOUS: 0
HEMATURIA: 0
MYALGIAS: 0
SORE THROAT: 0
FEVER: 0
SHORTNESS OF BREATH: 0
BREAST MASS: 0
HEMATOCHEZIA: 0
HEARTBURN: 0
NAUSEA: 0
ABDOMINAL PAIN: 0
JOINT SWELLING: 0
CHILLS: 0
FREQUENCY: 0

## 2019-09-26 NOTE — PATIENT INSTRUCTIONS
It was nice to see you in clinic.    I'll be in touch with your lab results via [employer].    Please call the clinic at [phone] to schedule your mammogram.    Osteoporosis  - checking vitamin D  - Endocrinology referral. Keep up with the weight bearing exercise.  Your referral information is below. You will need to call to schedule an appointment.       Preventive Health Recommendations  Female Ages 50 - 64    Yearly exam: See your health care provider every year in order to  o Review health changes.   o Discuss preventive care.    o Review your medicines if your doctor has prescribed any.      Get a Pap test every three years (unless you have an abnormal result and your provider advises testing more often).    If you get Pap tests with HPV test, you only need to test every 5 years, unless you have an abnormal result.     You do not need a Pap test if your uterus was removed (hysterectomy) and you have not had cancer.    You should be tested each year for STDs (sexually transmitted diseases) if you're at risk.     Have a mammogram every 1 to 2 years.    Have a colonoscopy at age 50, or have a yearly FIT test (stool test). These exams screen for colon cancer.      Have a cholesterol test every 5 years, or more often if advised.    Have a diabetes test (fasting glucose) every three years. If you are at risk for diabetes, you should have this test more often.     If you are at risk for osteoporosis (brittle bone disease), think about having a bone density scan (DEXA).    Shots: Get a flu shot each year. Get a tetanus shot every 10 years.    Nutrition:     Eat at least 5 servings of fruits and vegetables each day.    Eat whole-grain bread, whole-wheat pasta and brown rice instead of white grains and rice.    Get adequate Calcium and Vitamin D.     Lifestyle    Exercise at least 150 minutes a week (30 minutes a day, 5 days a week). This will help you control your weight and prevent disease.    Limit alcohol to one  drink per day.    No smoking.     Wear sunscreen to prevent skin cancer.     See your dentist every six months for an exam and cleaning.    See your eye doctor every 1 to 2 years.

## 2019-09-26 NOTE — PROGRESS NOTES
SUBJECTIVE:   CC: Latesha Ruiz is an 59 year old woman who presents for preventive health visit.     Healthy Habits:     Getting at least 3 servings of Calcium per day:  Yes    Bi-annual eye exam:  Yes    Dental care twice a year:  Yes    Sleep apnea or symptoms of sleep apnea:  None    Diet:  Regular (no restrictions)    Frequency of exercise:  None    Taking medications regularly:  Yes    Medication side effects:  Not applicable    PHQ-2 Total Score: 0    # Osteoporosis  - went through menopause really early  - didn't tolerate bisphosphonates (GI issues) - took a long time to recover  - not taking any vitamin d right now  - not sure she wants to meet with Endo but would be okay with a referral and she can     Couple years ago pulled muscle in right groin that sometimes bothers her   - saw ortho and did some pt  - has had some residual pain in right groin  - pulls in the area of her appy/c section  - no weight loss  - no stool change  - no abd pain    Today's PHQ-2 Score:   PHQ-2 ( 1999 [employer]izer) 9/27/2019   Q1: Little interest or pleasure in doing things 0   Q2: Feeling down, depressed or hopeless 0   PHQ-2 Score 0   Q1: Little interest or pleasure in doing things -   Q2: Feeling down, depressed or hopeless -   PHQ-2 Score -     Abuse: Current or Past(Physical, Sexual or Emotional)- no  Do you feel safe in your environment? Yes    Social History     Tobacco Use     Smoking status: Never Smoker     Smokeless tobacco: Never Used   Substance Use Topics     Alcohol use: No     Frequency: Never     Binge frequency: Never     Alcohol Use 9/26/2019   Prescreen: >3 drinks/day or >7 drinks/week? Not Applicable   Prescreen: >3 drinks/day or >7 drinks/week? -     Reviewed orders with patient.  Reviewed health maintenance and updated orders accordingly - Yes  Patient Active Problem List   Diagnosis     Allergic rhinitis     Osteoporosis     RUSSEL (generalized anxiety disorder)     CARDIOVASCULAR SCREENING; LDL GOAL LESS  THAN 160     Lactose intolerance     Stress incontinence     Sebaceous hyperplasia of face     AK (actinic keratosis)     Vitamin D deficiency     Past Surgical History:   Procedure Laterality Date     ABDOMEN SURGERY  , ,     3 C-sections     BIOPSY  , ?    breast lumps - benign     BREAST SURGERY  ,     benign lumps     C APPENDECTOMY  1971     C  DELIVERY ONLY      Primary C/S - FTP     C  DELIVERY ONLY      Repeat C/S     C  DELIVERY ONLY      Repeat C/S, TL     C NONSPECIFIC PROCEDURE      Breast Bx - Right     COLONOSCOPY  ,        Social History     Tobacco Use     Smoking status: Never Smoker     Smokeless tobacco: Never Used   Substance Use Topics     Alcohol use: No     Frequency: Never     Binge frequency: Never     Family History   Problem Relation Age of Onset     Psychotic Disorder Mother         anxiety     Thyroid Disease Mother         Born [birth year]. Has CAD, two stents placed.      C.A.D. Mother         Had MI/stents x 3 at age 84.     Cancer Mother         BCC of face     Coronary Artery Disease Mother         at age 84 - stents     Other Cancer Mother         lung cancer - age 92 non-smoker     Family History Negative Father         Born [birth year]. Resides in SNF. Has spinal stenosis, difficulty ambulating, also short term memory loss.      Prostate Cancer Father         age 65 or so     Breast Cancer Other      Gastrointestinal Disease Son         gastroesophageal reflux     Neurologic Disorder Son         seizure disorder     Family History Negative Sister         Brief period of anxiety, born      Thyroid Disease Sister         Non cancerous growth on thyroid removed     Psychotic Disorder Brother         anxiety/Born [birth year]     Respiratory Child         mild asthma/mild asthma     Cancer Paternal Grandfather         ?stomach CA (was a digestive organ)     Family History Negative Maternal Grandfather          age 74,  pulmonary embolism. Had colitis     Breast Cancer Maternal Grandmother      Depression Brother      Thyroid Disease Sister          No current outpatient medications on file.     Allergies   Allergen Reactions     Asa [Aspirin]      Codeine Nausea and Vomiting     Erythromycin Nausea     Penicillins Hives       Mammogram Screening: Patient over age 50, mutual decision to screen reflected in health maintenance.    Pertinent mammograms are reviewed under the imaging tab.  History of abnormal Pap smear: NO - age 30- 65 PAP every 3 years recommended  PAP / HPV 2016 11/3/2014 2011   PAP NIL NIL NIL     Reviewed and updated as needed this visit by clinical staff  Tobacco  Allergies  Meds  Med Hx  Surg Hx  Fam Hx  Soc Hx        Reviewed and updated as needed this visit by Provider  Meds        Past Medical History:   Diagnosis Date     AK (actinic keratosis) 2013     Allergic rhinitis, cause unspecified      Arthritis ?    mild in thumbs, jaw     Closed fracture of metacarpal bone(s), site unspecified     Right 5th metacarpal fx--caught in a door     Closed fracture of rib(s), unspecified ~     Contact dermatitis and other eczema, due to unspecified cause      Disorder of bone and cartilage, unspecified ~2000    T-score -2.00 in L-spine in      Lumbago      Premature menopause     Elevated FSH/LH in --normal brain MRI 2001      Past Surgical History:   Procedure Laterality Date     ABDOMEN SURGERY  , ,     3 C-sections     BIOPSY  , ?    breast lumps - benign     BREAST SURGERY  ,     benign lumps     C APPENDECTOMY       C  DELIVERY ONLY      Primary C/S - FTP     C  DELIVERY ONLY      Repeat C/S     C  DELIVERY ONLY      Repeat C/S, TL     C NONSPECIFIC PROCEDURE      Breast Bx - Right     COLONOSCOPY  ,        Review of Systems   Constitutional: Negative for chills and fever.   HENT: Negative for  "congestion, ear pain, hearing loss and sore throat.    Eyes: Negative for pain and visual disturbance.   Respiratory: Negative for cough and shortness of breath.    Cardiovascular: Negative for chest pain, palpitations and peripheral edema.   Gastrointestinal: Negative for abdominal pain, constipation, diarrhea, heartburn, hematochezia and nausea.   Breasts:  Negative for tenderness, breast mass and discharge.   Genitourinary: Negative for dysuria, frequency, genital sores, hematuria, pelvic pain, urgency, vaginal bleeding and vaginal discharge.   Musculoskeletal: Negative for arthralgias, joint swelling and myalgias.   Skin: Negative for rash.   Neurological: Negative for dizziness, weakness, headaches and paresthesias.   Psychiatric/Behavioral: Negative for mood changes. The patient is not nervous/anxious.       OBJECTIVE:   BP 96/60 (BP Location: Right arm, Patient Position: Sitting)   Pulse 62   Resp 16   Ht 1.765 m (5' 9.5\")   Wt 70.8 kg (156 lb)   BMI 22.71 kg/m    Physical Exam  GENERAL: healthy, alert and no distress  EYES: Eyes grossly normal to inspection, PERRL and conjunctivae and sclerae normal  HENT: ear canals and TM's normal, nose and mouth without ulcers or lesions  NECK: no adenopathy, no asymmetry, masses, or scars and thyroid normal to palpation  RESP: lungs clear to auscultation - no rales, rhonchi or wheezes  CV: regular rate and rhythm, normal S1 S2, no S3 or S4, no murmur, click or rub, no peripheral edema and peripheral pulses strong  ABDOMEN: soft, nontender, no hepatosplenomegaly, no masses and bowel sounds normal   (female): normal female external genitalia, normal urethral meatus, vaginal mucosa pink, moist, well rugated, and normal cervix/adnexa/uterus without masses or discharge  MS: no gross musculoskeletal defects noted, no edema  SKIN: no suspicious lesions or rashes  NEURO: Normal strength and tone, mentation intact and speech normal  PSYCH: mentation appears normal, affect " "normal/bright    Diagnostic Test Results:  Labs reviewed in Epic    ASSESSMENT/PLAN:   1. Routine general medical examination at a health care facility  Suspect intermittent right groin pain is msk.     2. Age-related osteoporosis without current pathological fracture  Reviewed history of bisphosphonate intolerance.   She will consider meeting with endo in the next year.   Emphasized vit d/exercise/calcium.  - ENDOCRINOLOGY ADULT REFERRAL    3. Vitamin D deficiency  - Vitamin D Deficiency; Future    4. Screening cholesterol level  - Lipid panel reflex to direct LDL Fasting    5. Screening for diabetes mellitus  - Comprehensive metabolic panel    6. Encounter for screening mammogram for breast cancer  - MA Screening Digital Bilateral; Future    7. Screening for cervical cancer  - Pap imaged thin layer screen with HPV - recommended age 30 - 65 years (select HPV order below)  - HPV High Risk Types DNA Cervical    COUNSELING:  Reviewed preventive health counseling, as reflected in patient instructions       Regular exercise       Healthy diet/nutrition       Vision screening       Immunizations    Vaccinated for: Influenza         Osteoporosis Prevention/Bone Health    Estimated body mass index is 22.71 kg/m  as calculated from the following:    Height as of this encounter: 1.765 m (5' 9.5\").    Weight as of this encounter: 70.8 kg (156 lb).     reports that she has never smoked. She has never used smokeless tobacco.    Counseling Resources:  ATP IV Guidelines  Pooled Cohorts Equation Calculator  Breast Cancer Risk Calculator  FRAX Risk Assessment  ICSI Preventive Guidelines  Dietary Guidelines for Americans, 2010  USDA's MyPlate  ASA Prophylaxis  Lung CA Screening    Darryl Wu MD  [facility] DONALD  "

## 2019-09-27 ENCOUNTER — OFFICE VISIT (OUTPATIENT)
Dept: PEDIATRICS | Facility: CLINIC | Age: 59
End: 2019-09-27
Payer: COMMERCIAL

## 2019-09-27 ENCOUNTER — ANCILLARY PROCEDURE (OUTPATIENT)
Dept: MAMMOGRAPHY | Facility: CLINIC | Age: 59
End: 2019-09-27
Attending: INTERNAL MEDICINE
Payer: COMMERCIAL

## 2019-09-27 VITALS
HEIGHT: 70 IN | DIASTOLIC BLOOD PRESSURE: 60 MMHG | SYSTOLIC BLOOD PRESSURE: 96 MMHG | BODY MASS INDEX: 22.33 KG/M2 | RESPIRATION RATE: 16 BRPM | HEART RATE: 62 BPM | WEIGHT: 156 LBS

## 2019-09-27 DIAGNOSIS — Z00.00 ROUTINE GENERAL MEDICAL EXAMINATION AT A HEALTH CARE FACILITY: Primary | ICD-10-CM

## 2019-09-27 DIAGNOSIS — Z13.1 SCREENING FOR DIABETES MELLITUS: ICD-10-CM

## 2019-09-27 DIAGNOSIS — M81.0 AGE-RELATED OSTEOPOROSIS WITHOUT CURRENT PATHOLOGICAL FRACTURE: ICD-10-CM

## 2019-09-27 DIAGNOSIS — E55.9 VITAMIN D DEFICIENCY: ICD-10-CM

## 2019-09-27 DIAGNOSIS — Z12.31 ENCOUNTER FOR SCREENING MAMMOGRAM FOR BREAST CANCER: ICD-10-CM

## 2019-09-27 DIAGNOSIS — Z13.220 SCREENING CHOLESTEROL LEVEL: ICD-10-CM

## 2019-09-27 DIAGNOSIS — Z12.4 SCREENING FOR CERVICAL CANCER: ICD-10-CM

## 2019-09-27 DIAGNOSIS — R74.8 ELEVATED LIVER ENZYMES: ICD-10-CM

## 2019-09-27 LAB
ALBUMIN SERPL-MCNC: 4 G/DL (ref 3.4–5)
ALP SERPL-CCNC: 71 U/L (ref 40–150)
ALT SERPL W P-5'-P-CCNC: 56 U/L (ref 0–50)
ANION GAP SERPL CALCULATED.3IONS-SCNC: 4 MMOL/L (ref 3–14)
AST SERPL W P-5'-P-CCNC: 23 U/L (ref 0–45)
BILIRUB SERPL-MCNC: 0.8 MG/DL (ref 0.2–1.3)
BUN SERPL-MCNC: 14 MG/DL (ref 7–30)
CALCIUM SERPL-MCNC: 8.6 MG/DL (ref 8.5–10.1)
CHLORIDE SERPL-SCNC: 107 MMOL/L (ref 94–109)
CHOLEST SERPL-MCNC: 242 MG/DL
CO2 SERPL-SCNC: 27 MMOL/L (ref 20–32)
CREAT SERPL-MCNC: 0.76 MG/DL (ref 0.52–1.04)
GFR SERPL CREATININE-BSD FRML MDRD: 86 ML/MIN/{1.73_M2}
GLUCOSE SERPL-MCNC: 93 MG/DL (ref 70–99)
HDLC SERPL-MCNC: 59 MG/DL
LDLC SERPL CALC-MCNC: 153 MG/DL
NONHDLC SERPL-MCNC: 183 MG/DL
POTASSIUM SERPL-SCNC: 4.2 MMOL/L (ref 3.4–5.3)
PROT SERPL-MCNC: 7.5 G/DL (ref 6.8–8.8)
SODIUM SERPL-SCNC: 138 MMOL/L (ref 133–144)
TRIGL SERPL-MCNC: 148 MG/DL

## 2019-09-27 PROCEDURE — 90682 RIV4 VACC RECOMBINANT DNA IM: CPT | Performed by: INTERNAL MEDICINE

## 2019-09-27 PROCEDURE — 82306 VITAMIN D 25 HYDROXY: CPT | Performed by: INTERNAL MEDICINE

## 2019-09-27 PROCEDURE — 77067 SCR MAMMO BI INCL CAD: CPT | Mod: TC

## 2019-09-27 PROCEDURE — 77063 BREAST TOMOSYNTHESIS BI: CPT | Mod: TC

## 2019-09-27 PROCEDURE — G0145 SCR C/V CYTO,THINLAYER,RESCR: HCPCS | Performed by: INTERNAL MEDICINE

## 2019-09-27 PROCEDURE — 80061 LIPID PANEL: CPT | Performed by: INTERNAL MEDICINE

## 2019-09-27 PROCEDURE — 80053 COMPREHEN METABOLIC PANEL: CPT | Performed by: INTERNAL MEDICINE

## 2019-09-27 PROCEDURE — 87624 HPV HI-RISK TYP POOLED RSLT: CPT | Performed by: INTERNAL MEDICINE

## 2019-09-27 PROCEDURE — 90471 IMMUNIZATION ADMIN: CPT | Performed by: INTERNAL MEDICINE

## 2019-09-27 PROCEDURE — 36415 COLL VENOUS BLD VENIPUNCTURE: CPT | Performed by: INTERNAL MEDICINE

## 2019-09-27 PROCEDURE — 99396 PREV VISIT EST AGE 40-64: CPT | Performed by: INTERNAL MEDICINE

## 2019-09-27 ASSESSMENT — ENCOUNTER SYMPTOMS
FEVER: 0
MYALGIAS: 0
CHILLS: 0
NERVOUS/ANXIOUS: 0
NAUSEA: 0
WEAKNESS: 0
ABDOMINAL PAIN: 0
DIARRHEA: 0
HEMATOCHEZIA: 0
HEADACHES: 0
JOINT SWELLING: 0
SORE THROAT: 0
BREAST MASS: 0
PALPITATIONS: 0
EYE PAIN: 0
HEMATURIA: 0
PARESTHESIAS: 0
DYSURIA: 0
SHORTNESS OF BREATH: 0
COUGH: 0
ARTHRALGIAS: 0
CONSTIPATION: 0
HEARTBURN: 0
FREQUENCY: 0
DIZZINESS: 0

## 2019-09-27 ASSESSMENT — MIFFLIN-ST. JEOR: SCORE: 1349.92

## 2019-09-30 LAB — DEPRECATED CALCIDIOL+CALCIFEROL SERPL-MC: 20 UG/L (ref 20–75)

## 2019-10-02 LAB
COPATH REPORT: NORMAL
PAP: NORMAL

## 2019-10-03 LAB
FINAL DIAGNOSIS: NORMAL
HPV HR 12 DNA CVX QL NAA+PROBE: NEGATIVE
HPV16 DNA SPEC QL NAA+PROBE: NEGATIVE
HPV18 DNA SPEC QL NAA+PROBE: NEGATIVE
SPECIMEN DESCRIPTION: NORMAL
SPECIMEN SOURCE CVX/VAG CYTO: NORMAL

## 2019-11-04 ENCOUNTER — HEALTH MAINTENANCE LETTER (OUTPATIENT)
Age: 59
End: 2019-11-04

## 2019-11-29 DIAGNOSIS — R74.8 ELEVATED LIVER ENZYMES: ICD-10-CM

## 2019-11-29 LAB
ALBUMIN SERPL-MCNC: 4 G/DL (ref 3.4–5)
ALP SERPL-CCNC: 68 U/L (ref 40–150)
ALT SERPL W P-5'-P-CCNC: 51 U/L (ref 0–50)
AST SERPL W P-5'-P-CCNC: 23 U/L (ref 0–45)
BILIRUB DIRECT SERPL-MCNC: 0.2 MG/DL (ref 0–0.2)
BILIRUB SERPL-MCNC: 0.8 MG/DL (ref 0.2–1.3)
PROT SERPL-MCNC: 7.1 G/DL (ref 6.8–8.8)

## 2019-11-29 PROCEDURE — 80076 HEPATIC FUNCTION PANEL: CPT | Performed by: INTERNAL MEDICINE

## 2019-11-29 PROCEDURE — 36415 COLL VENOUS BLD VENIPUNCTURE: CPT | Performed by: INTERNAL MEDICINE

## 2020-01-21 ENCOUNTER — OFFICE VISIT (OUTPATIENT)
Dept: FAMILY MEDICINE | Facility: CLINIC | Age: 60
End: 2020-01-21
Payer: COMMERCIAL

## 2020-01-21 VITALS — SYSTOLIC BLOOD PRESSURE: 114 MMHG | DIASTOLIC BLOOD PRESSURE: 62 MMHG

## 2020-01-21 DIAGNOSIS — L57.0 AK (ACTINIC KERATOSIS): ICD-10-CM

## 2020-01-21 DIAGNOSIS — Z87.2 HISTORY OF ACTINIC KERATOSES: ICD-10-CM

## 2020-01-21 DIAGNOSIS — Z12.83 SKIN CANCER SCREENING: Primary | ICD-10-CM

## 2020-01-21 DIAGNOSIS — L81.4 SOLAR LENTIGO: ICD-10-CM

## 2020-01-21 DIAGNOSIS — Z80.8 FAMILY HISTORY OF BASAL CELL CARCINOMA: ICD-10-CM

## 2020-01-21 DIAGNOSIS — D22.5 MELANOCYTIC NEVI OF TRUNK: ICD-10-CM

## 2020-01-21 PROCEDURE — 99213 OFFICE O/P EST LOW 20 MIN: CPT | Mod: 25 | Performed by: FAMILY MEDICINE

## 2020-01-21 PROCEDURE — 17003 DESTRUCT PREMALG LES 2-14: CPT | Performed by: FAMILY MEDICINE

## 2020-01-21 PROCEDURE — 17000 DESTRUCT PREMALG LESION: CPT | Performed by: FAMILY MEDICINE

## 2020-01-21 NOTE — PATIENT INSTRUCTIONS
FUTURE APPOINTMENTS  Follow up in 1 year(s) for a full-body skin cancer screening.    ACTINIC KERATOSES POST-TREATMENT CARE INSTRUCTIONS  Actinic keratoses are benign, scaly or gritty lesions that appear in sun-exposed areas and may progress to skin cancers. For this reason, it is important to treat them before they become cancerous. Liquid nitrogen is the most commonly used and most effective treatment for actinic keratoses; it is mildly uncomfortable when applied to the skin, but the discomfort rapidly subsides.    Post-Treatment:  You may experience burning and/or stinging immediately following the procedure. The discomfort from the procedure may persist over the next 12-24 hours. The area treated will look pinker and slightly swollen before the healing process begins. You may also notice redness, swelling, tenderness, weeping and crusts or scabs. Healing time is approximately 10-14 days.    Blister - You may or may notice blistering from the freezing. If you develop an uncomfortable blister from today's treatment, you may gently puncture this with a needle that has been cleaned with alcohol. However, do not remove the protective skin layer of the blister.    Scab - After a few days, you may notice scaliness or scab formation. Do not pick at the scabs because this may cause slower healing and a permanent scar.    The skin may appear temporarily darker at the treatment site, but this usually fades over a period of months, provided that the area is protected from the sun.    Care of the areas treated:    Wash the area with a mild cleanser.    Gently pat dry.    Do not rub.     Keep protected from the sun during the healing process and for a full year following treatment as the skin continues to remodel during this time.    Apply Vaseline or Aquaphor ointment sparingly to the site for the first 7 days after treatment.    Do not use Neosporin, as many people eventually develop a medication allergy, that can easily be  "confused with an infection, to Neomycin.    Return if:  There should not be any residual scaling. If there is any concern that the lesion has persisted after 4-6 weeks, make an appointment for a re-check. Healing time does vary depending on your individual healing process and the area of the body treated. Most patients will be healed in one month.    Signs of Infection:  Thankfully this is rare. However if you notice persistent colored drainage, increasing pain, fever or other signs of infection, please call us at: [phone]    SUN PROTECTION INSTRUCTIONS  Sun damage can lead to skin cancer and premature aging of the skin.      The best way to protect from sun damage to your skin is to avoid the sun during peak hours (10 am - 2 pm) even on overcast days.    Never use tanning beds. Tanning beds are associated with much higher risks of skin cancer.    All tanning damages the skin. Aim for ivory skin year round and you will have less trouble with your skin in years to come. There is no merit in getting \"a base tan\" before a warm weather vacation, as any tanning indicates your body's response to sun damage.    Stop smoking. Smokers have higher rates of skin cancer and also have premature skin wrinkling.    Use UPF sun-protective clothing, which while more expensive initially provides longer lasting coverage without having to worry about remembering to re-apply.  1. Wear a wide-brimmed hat and sunglasses.   2. Wear sun-protective clothing.  [employer] other [employer] make sun protective clothing that are stylish, comfortable and cool.   [employer] and other [employer] make UV arm sleeves suitable for golfing, gardening and other activities.    Sunscreen instructions:  1. Use sunscreens with Zinc Oxide, Titanium Dioxide or Avobenzone to protect from UVA rays.  2. Use SPF 30-50+ to protect from UVB rays.  3. Re-apply every 2 hours even if water resistant.  4. Apply on your face every day even when " "cloudy and even in the winter. UVA \"aging rays\" penetrate window glass and are just as strong in the winter as in the summer.    FYI  You should use about 3 tablespoons of sunscreen to protect your whole body. Thus a typical eight ounce bottle of sunscreen should last 4 applications. Remember, that the SPF rating is compromised if you don t apply enough. Most people only apply 1/2 - 1/3 of the amount they need. Also don t forget areas such as your ears, feet, upper back and harder to reach places. Keep in mind that these amounts should be increased for larger body sizes.    Sunscreens with titanium dioxide and/or zinc oxide in the active ingredients are physical blockers as opposed to chemical blockers. Chemical-free sunscreens should not irritate the skin.    Spray-on sunscreens may be used for touch-up application only, not as a base layer. Also, use with caution around small children due to inhalation risk.    SPF means sun protection factor, which is just the degree to which the sunscreen can protect against UVB rays. There is no rating system for UVA rays. SPF is calculated as the time skin will burn when sunscreen is applied vs. skin without sunscreen.    Water resistant sunscreens should be re-applied every 1-2 hours.    Product Recommendations:    Consider use of sunscreen sticks with Zinc Oxide and Titanium Dioxide active ingredients such as Neutrogena Pure&Free Baby Sunscreen Stick.    Good examples include: Blue Lizard, EltaMD, Solbar    Good daily moisturizers with SPF: Vanicream, CeraVe.    For sensitive skin, consider : SkinMedica Essential Defense Mineral Shield Broad Spectrum SPF 35    Men: consider use of Neutrogena Triple Protect Facial Lotion    Avoid retinyl palmitate products.  Avoid combination products that include both sunscreen and insect repellant, as sunscreen should be applied every 2 hours, but insect repellant should not be applied as frequently.    For more " "information:  https://www.skincancer.org/prevention/sun-protection/sunscreen/sunscreens-safe-and-effective        DRY SKIN MANAGEMENT INSTRUCTIONS  Routine use of moisturizer is important for healthy, resilient skin not just for soft skin.     Sealing in moisture    Twice daily use of a moisturizer such as over-the-counter (OTC) CeraVe moisturizer cream (in the jar) or CeraVe healing ointment or Vaseline. CeraVe products contain ceramides and filaggrin proteins that can help to maintain the body's moisture layer.    After cleansing or washing, always apply moisturizer immediately after drying off (pat dry only) for best effect.    Protection while hydrating    Do not overuse soap. Unless you have been sweating extensively, just apply soap to groin and armpits.    Recommended products for body include: OTC unscented Dove for sensitive skin or OTC Vanicream cleansing bar.    Recommended facial cleansers include: OTC CeraVe hydrating facial cleanser or OTC Cetaphil daily facial cleanser.    Always try to wear rubber gloves when washing dishes or cleaning.    Avoid use of    Scented/perfumed products    Irritating clothing (wool, new jeans, new/unwashed clothing, scratchy synthetics)    Neosporin or triple antibiotic topical products    Products containing aloe, herbs, Vitamin E, or other \"natural ingredients\".    Dryer sheets or fabric softeners (while symptoms are present)    If a topical medication is prescribed, apply topical prescription first, followed by use of moisturizing product.    Wear socks and/or cotton gloves (can purchase at any retail pharmacy) after application of moisturizer nightly at bedtime and wear until the morning.    "

## 2020-01-21 NOTE — PROGRESS NOTES
[facility] - PRIMARY CARE SKIN    CC: skin cancer screening (full-body)  SUBJECTIVE:   Latesha Ruiz is a(n) 60 year old female who presents to clinic today for a full-body skin exam.    No bothersome lesions noticed by the patient or other skin concerns.  She has had an actinic keratosis on the left cheek treated with cryotherapy in August 2018.    Her hands are very dry in the winter. She is applying Lubriderm and Vaseline regularly. She has noticed some fissures on the hands.    Personal Medical History  Skin cancer: NO - but history of actinic keratoses    Family Medical History  Skin cancer: YES - basal cell carcinoma in mother at age 90    Sun Exposure History  Previous history of significant sun exposure: YES  Sunscreen usage: YES, frequency: intermittently.    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    Refer to electronic medical record (EMR) for past medical history and medications.    INTEGUMENTARY/SKIN: NEGATIVE for worrisome rashes, moles or lesions  ROS: 14 point review of systems was negative except the symptoms listed above in the HPI.    This document serves as a record of the services and decisions personally performed and made by Tamara Gonzalez MD and was created by Robbie Lowe, a trained medical scribe, based on personal observations and provider statements to the medical scribe.  January 21, 2020 12:05 PM   Robbie Lowe    OBJECTIVE:   GENERAL: healthy, alert and no distress.  HEENT: PERRL. Conjunctiva, sclera clear.  SKIN: [place] Skin Type - I.  This patient was examined from the top of the head to the bottom of the feet  including scalp, face, neck, trunk, buttocks, both arms, both legs, both hands, both feet, and all fingers and toes. The dermatoscope was used to help evaluate pigmented lesions.  Skin Pertinent Findings:  Face: brown, macule(s) most consistent with benign solar lentigo.    Upper and lower extremities: brown, macule(s) most  consistent with benign solar lentigo.    Left mid-forearm: Vague subepidermal fullness.    Back: brown, macule(s) most consistent with benign solar lentigo. Infrequent brown macules of various sizes and shapes most consistent with (benign) melanocytic nevi    Significant Findings:  Erythematous, scaly, non-indurated lesion(s) most consistent with actinic keratosis involving the left cheek (2 cm inferior to the left lower mid-eyelid) x1. Atrophic actinic keratosis involving the left forearm x1  Name: Liquid nitrogen Cry-Ac cryotherapy  Indication: Pre-malignant actinic keratosis  Completed by: Amy Gonzalez MD.  Note : Discussed natural history of lesion and treatment options. Prior to treatment, we discussed inflammation, tenderness post-procedure, the healing process, and the risks of pain, infection, scarring, blistering, and hypo-/hyperpigmentation after healing. Explained that these lesions may grow back and may need additional treatment or re-evaluation. The patient understood and verbally agreed to proceed with cryotherapy.    Each actinic keratosis was treated using liquid nitrogen Cry-Ac with a two five second bursts with a pause to allow for the area to thaw.    The patient tolerated the procedure well and left in good condition. If this lesion should persist or recur, then it needs to be re-evaluated.  Total number of lesions treated: 2.    ASSESSMENT:     Encounter Diagnoses   Name Primary?     Skin cancer screening Yes     History of actinic keratoses      Family history of basal cell carcinoma      AK (actinic keratosis)      Solar lentigo      Melanocytic nevi of trunk        PLAN:   Patient Instructions   FUTURE APPOINTMENTS  Follow up in 1 year(s) for a full-body skin cancer screening.    ACTINIC KERATOSES POST-TREATMENT CARE INSTRUCTIONS  Actinic keratoses are benign, scaly or gritty lesions that appear in sun-exposed areas and may progress to skin cancers. For this reason, it is important to treat  them before they become cancerous. Liquid nitrogen is the most commonly used and most effective treatment for actinic keratoses; it is mildly uncomfortable when applied to the skin, but the discomfort rapidly subsides.    Post-Treatment:  You may experience burning and/or stinging immediately following the procedure. The discomfort from the procedure may persist over the next 12-24 hours. The area treated will look pinker and slightly swollen before the healing process begins. You may also notice redness, swelling, tenderness, weeping and crusts or scabs. Healing time is approximately 10-14 days.    Blister - You may or may notice blistering from the freezing. If you develop an uncomfortable blister from today's treatment, you may gently puncture this with a needle that has been cleaned with alcohol. However, do not remove the protective skin layer of the blister.    Scab - After a few days, you may notice scaliness or scab formation. Do not pick at the scabs because this may cause slower healing and a permanent scar.    The skin may appear temporarily darker at the treatment site, but this usually fades over a period of months, provided that the area is protected from the sun.    Care of the areas treated:    Wash the area with a mild cleanser.    Gently pat dry.    Do not rub.     Keep protected from the sun during the healing process and for a full year following treatment as the skin continues to remodel during this time.    Apply Vaseline or Aquaphor ointment sparingly to the site for the first 7 days after treatment.    Do not use Neosporin, as many people eventually develop a medication allergy, that can easily be confused with an infection, to Neomycin.    Return if:  There should not be any residual scaling. If there is any concern that the lesion has persisted after 4-6 weeks, make an appointment for a re-check. Healing time does vary depending on your individual healing process and the area of the body  "treated. Most patients will be healed in one month.    Signs of Infection:  Thankfully this is rare. However if you notice persistent colored drainage, increasing pain, fever or other signs of infection, please call us at: [phone]    SUN PROTECTION INSTRUCTIONS  Sun damage can lead to skin cancer and premature aging of the skin.      The best way to protect from sun damage to your skin is to avoid the sun during peak hours (10 am - 2 pm) even on overcast days.    Never use tanning beds. Tanning beds are associated with much higher risks of skin cancer.    All tanning damages the skin. Aim for ivory skin year round and you will have less trouble with your skin in years to come. There is no merit in getting \"a base tan\" before a warm weather vacation, as any tanning indicates your body's response to sun damage.    Stop smoking. Smokers have higher rates of skin cancer and also have premature skin wrinkling.    Use UPF sun-protective clothing, which while more expensive initially provides longer lasting coverage without having to worry about remembering to re-apply.  1. Wear a wide-brimmed hat and sunglasses.   2. Wear sun-protective clothing.  [employer] and other [employer] make sun protective clothing that are stylish, comfortable and cool.   [employer] and other [employer] make UV arm sleeves suitable for golfing, gardening and other activities.    Sunscreen instructions:  1. Use sunscreens with Zinc Oxide, Titanium Dioxide or Avobenzone to protect from UVA rays.  2. Use SPF 30-50+ to protect from UVB rays.  3. Re-apply every 2 hours even if water resistant.  4. Apply on your face every day even when cloudy and even in the winter. UVA \"aging rays\" penetrate window glass and are just as strong in the winter as in the summer.    FYI  You should use about 3 tablespoons of sunscreen to protect your whole body. Thus a typical eight ounce bottle of sunscreen should last 4 applications. Remember, that the " SPF rating is compromised if you don t apply enough. Most people only apply 1/2 - 1/3 of the amount they need. Also don t forget areas such as your ears, feet, upper back and harder to reach places. Keep in mind that these amounts should be increased for larger body sizes.    Sunscreens with titanium dioxide and/or zinc oxide in the active ingredients are physical blockers as opposed to chemical blockers. Chemical-free sunscreens should not irritate the skin.    Spray-on sunscreens may be used for touch-up application only, not as a base layer. Also, use with caution around small children due to inhalation risk.    SPF means sun protection factor, which is just the degree to which the sunscreen can protect against UVB rays. There is no rating system for UVA rays. SPF is calculated as the time skin will burn when sunscreen is applied vs. skin without sunscreen.    Water resistant sunscreens should be re-applied every 1-2 hours.    Product Recommendations:    Consider use of sunscreen sticks with Zinc Oxide and Titanium Dioxide active ingredients such as Neutrogena Pure&Free Baby Sunscreen Stick.    Good examples include: Blue Lizard, EltaMD, Solbar    Good daily moisturizers with SPF: Vanicream, CeraVe.    For sensitive skin, consider : SkinMedica Essential Defense Mineral Shield Broad Spectrum SPF 35    Men: consider use of Neutrogena Triple Protect Facial Lotion    Avoid retinyl palmitate products.  Avoid combination products that include both sunscreen and insect repellant, as sunscreen should be applied every 2 hours, but insect repellant should not be applied as frequently.    For more information:  https://www.skincancer.org/prevention/sun-protection/sunscreen/sunscreens-safe-and-effective        DRY SKIN MANAGEMENT INSTRUCTIONS  Routine use of moisturizer is important for healthy, resilient skin not just for soft skin.     Sealing in moisture    Twice daily use of a moisturizer such as over-the-counter (OTC)  "CeraVe moisturizer cream (in the jar) or CeraVe healing ointment or Vaseline. CeraVe products contain ceramides and filaggrin proteins that can help to maintain the body's moisture layer.    After cleansing or washing, always apply moisturizer immediately after drying off (pat dry only) for best effect.    Protection while hydrating    Do not overuse soap. Unless you have been sweating extensively, just apply soap to groin and armpits.    Recommended products for body include: OTC unscented Dove for sensitive skin or OTC Vanicream cleansing bar.    Recommended facial cleansers include: OTC CeraVe hydrating facial cleanser or OTC Cetaphil daily facial cleanser.    Always try to wear rubber gloves when washing dishes or cleaning.    Avoid use of    Scented/perfumed products    Irritating clothing (wool, new jeans, new/unwashed clothing, scratchy synthetics)    Neosporin or triple antibiotic topical products    Products containing aloe, herbs, Vitamin E, or other \"natural ingredients\".    Dryer sheets or fabric softeners (while symptoms are present)    If a topical medication is prescribed, apply topical prescription first, followed by use of moisturizing product.    Wear socks and/or cotton gloves (can purchase at any retail pharmacy) after application of moisturizer nightly at bedtime and wear until the morning.      The patient was counseled about sunscreens and sun avoidance. The patient was counseled to check the skin regularly and report any lesion that is new, changing, itching, scabbing, bleeding or otherwise bothersome. The patient was discharged ambulatory and in stable condition.    TT: 25 minutes.  CT: 15 minutes.    The information in this document, created by the medical scribe for me, accurately reflects the services I personally performed and the decisions made by me. I have reviewed and approved this document for accuracy prior to leaving the patient care area.  January 21, 2020 12:05 PM  Tamara TORRES" MD Lisa  [facility] [place]

## 2020-01-21 NOTE — LETTER
1/21/2020         RE: Latesha Ruiz  [address]  [facility] [address]        Dear Colleague,    Thank you for referring your patient, Latesha Ruiz, to the [facility]. Please see a copy of my visit note below.    [facility] - PRIMARY CARE SKIN    CC: skin cancer screening (full-body)  SUBJECTIVE:   Latesha Ruiz is a(n) 60 year old female who presents to clinic today for a full-body skin exam.    No bothersome lesions noticed by the patient or other skin concerns.  She has had an actinic keratosis on the left cheek treated with cryotherapy in August 2018.    Her hands are very dry in the winter. She is applying Lubriderm and Vaseline regularly. She has noticed some fissures on the hands.    Personal Medical History  Skin cancer: NO - but history of actinic keratoses    Family Medical History  Skin cancer: YES - basal cell carcinoma in mother at age 90    Sun Exposure History  Previous history of significant sun exposure: YES  Sunscreen usage: YES, frequency: intermittently.    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    Refer to electronic medical record (EMR) for past medical history and medications.    INTEGUMENTARY/SKIN: NEGATIVE for worrisome rashes, moles or lesions  ROS: 14 point review of systems was negative except the symptoms listed above in the HPI.    This document serves as a record of the services and decisions personally performed and made by Tamara Gonzalez MD and was created by Robbie Lowe, a trained medical scribe, based on personal observations and provider statements to the medical scribe.  January 21, 2020 12:05 PM   Robbie Lowe    OBJECTIVE:   GENERAL: healthy, alert and no distress.  HEENT: PERRL. Conjunctiva, sclera clear.  SKIN: Crespo Skin Type - I.  This patient was examined from the top of the head to the bottom of the feet  including scalp, face, neck, trunk, buttocks, both arms, both legs,  both hands, both feet, and all fingers and toes. The dermatoscope was used to help evaluate pigmented lesions.  Skin Pertinent Findings:  Face: brown, macule(s) most consistent with benign solar lentigo.    Upper and lower extremities: brown, macule(s) most consistent with benign solar lentigo.    Left mid-forearm: Vague subepidermal fullness.    Back: brown, macule(s) most consistent with benign solar lentigo. Infrequent brown macules of various sizes and shapes most consistent with (benign) melanocytic nevi    Significant Findings:  Erythematous, scaly, non-indurated lesion(s) most consistent with actinic keratosis involving the left cheek (2 cm inferior to the left lower mid-eyelid) x1. Atrophic actinic keratosis involving the left forearm x1  Name: Liquid nitrogen Cry-Ac cryotherapy  Indication: Pre-malignant actinic keratosis  Completed by: Amy Gonzalez MD.  Note : Discussed natural history of lesion and treatment options. Prior to treatment, we discussed inflammation, tenderness post-procedure, the healing process, and the risks of pain, infection, scarring, blistering, and hypo-/hyperpigmentation after healing. Explained that these lesions may grow back and may need additional treatment or re-evaluation. The patient understood and verbally agreed to proceed with cryotherapy.    Each actinic keratosis was treated using liquid nitrogen Cry-Ac with a two five second bursts with a pause to allow for the area to thaw.    The patient tolerated the procedure well and left in good condition. If this lesion should persist or recur, then it needs to be re-evaluated.  Total number of lesions treated: 2.    ASSESSMENT:     Encounter Diagnoses   Name Primary?     Skin cancer screening Yes     History of actinic keratoses      Family history of basal cell carcinoma      AK (actinic keratosis)      Solar lentigo      Melanocytic nevi of trunk        PLAN:   Patient Instructions   FUTURE APPOINTMENTS  Follow up in 1 year(s)  for a full-body skin cancer screening.    ACTINIC KERATOSES POST-TREATMENT CARE INSTRUCTIONS  Actinic keratoses are benign, scaly or gritty lesions that appear in sun-exposed areas and may progress to skin cancers. For this reason, it is important to treat them before they become cancerous. Liquid nitrogen is the most commonly used and most effective treatment for actinic keratoses; it is mildly uncomfortable when applied to the skin, but the discomfort rapidly subsides.    Post-Treatment:  You may experience burning and/or stinging immediately following the procedure. The discomfort from the procedure may persist over the next 12-24 hours. The area treated will look pinker and slightly swollen before the healing process begins. You may also notice redness, swelling, tenderness, weeping and crusts or scabs. Healing time is approximately 10-14 days.    Blister - You may or may notice blistering from the freezing. If you develop an uncomfortable blister from today's treatment, you may gently puncture this with a needle that has been cleaned with alcohol. However, do not remove the protective skin layer of the blister.    Scab - After a few days, you may notice scaliness or scab formation. Do not pick at the scabs because this may cause slower healing and a permanent scar.    The skin may appear temporarily darker at the treatment site, but this usually fades over a period of months, provided that the area is protected from the sun.    Care of the areas treated:    Wash the area with a mild cleanser.    Gently pat dry.    Do not rub.     Keep protected from the sun during the healing process and for a full year following treatment as the skin continues to remodel during this time.    Apply Vaseline or Aquaphor ointment sparingly to the site for the first 7 days after treatment.    Do not use Neosporin, as many people eventually develop a medication allergy, that can easily be confused with an infection, to  "Neomycin.    Return if:  There should not be any residual scaling. If there is any concern that the lesion has persisted after 4-6 weeks, make an appointment for a re-check. Healing time does vary depending on your individual healing process and the area of the body treated. Most patients will be healed in one month.    Signs of Infection:  Thankfully this is rare. However if you notice persistent colored drainage, increasing pain, fever or other signs of infection, please call us at: [phone]    SUN PROTECTION INSTRUCTIONS  Sun damage can lead to skin cancer and premature aging of the skin.      The best way to protect from sun damage to your skin is to avoid the sun during peak hours (10 am - 2 pm) even on overcast days.    Never use tanning beds. Tanning beds are associated with much higher risks of skin cancer.    All tanning damages the skin. Aim for ivory skin year round and you will have less trouble with your skin in years to come. There is no merit in getting \"a base tan\" before a warm weather vacation, as any tanning indicates your body's response to sun damage.    Stop smoking. Smokers have higher rates of skin cancer and also have premature skin wrinkling.    Use UPF sun-protective clothing, which while more expensive initially provides longer lasting coverage without having to worry about remembering to re-apply.  1. Wear a wide-brimmed hat and sunglasses.   2. Wear sun-protective clothing.  [employer] and other [employer] make sun protective clothing that are stylish, comfortable and cool.   [employer] and other [employer] make UV arm sleeves suitable for golfing, gardening and other activities.    Sunscreen instructions:  1. Use sunscreens with Zinc Oxide, Titanium Dioxide or Avobenzone to protect from UVA rays.  2. Use SPF 30-50+ to protect from UVB rays.  3. Re-apply every 2 hours even if water resistant.  4. Apply on your face every day even when cloudy and even in the winter. UVA " "\"aging rays\" penetrate window glass and are just as strong in the winter as in the summer.    FYI  You should use about 3 tablespoons of sunscreen to protect your whole body. Thus a typical eight ounce bottle of sunscreen should last 4 applications. Remember, that the SPF rating is compromised if you don t apply enough. Most people only apply 1/2 - 1/3 of the amount they need. Also don t forget areas such as your ears, feet, upper back and harder to reach places. Keep in mind that these amounts should be increased for larger body sizes.    Sunscreens with titanium dioxide and/or zinc oxide in the active ingredients are physical blockers as opposed to chemical blockers. Chemical-free sunscreens should not irritate the skin.    Spray-on sunscreens may be used for touch-up application only, not as a base layer. Also, use with caution around small children due to inhalation risk.    SPF means sun protection factor, which is just the degree to which the sunscreen can protect against UVB rays. There is no rating system for UVA rays. SPF is calculated as the time skin will burn when sunscreen is applied vs. skin without sunscreen.    Water resistant sunscreens should be re-applied every 1-2 hours.    Product Recommendations:    Consider use of sunscreen sticks with Zinc Oxide and Titanium Dioxide active ingredients such as Neutrogena Pure&Free Baby Sunscreen Stick.    Good examples include: Blue Lizard, EltaMD, Solbar    Good daily moisturizers with SPF: Vanicream, CeraVe.    For sensitive skin, consider : Skin[employer]ica Essential Defense Mineral Shield Broad Spectrum SPF 35    Men: consider use of Neutrogena Triple Protect Facial Lotion    Avoid retinyl palmitate products.  Avoid combination products that include both sunscreen and insect repellant, as sunscreen should be applied every 2 hours, but insect repellant should not be applied as frequently.    For more " "information:  https://www.skincancer.org/prevention/sun-protection/sunscreen/sunscreens-safe-and-effective        DRY SKIN MANAGEMENT INSTRUCTIONS  Routine use of moisturizer is important for healthy, resilient skin not just for soft skin.     Sealing in moisture    Twice daily use of a moisturizer such as over-the-counter (OTC) CeraVe moisturizer cream (in the jar) or CeraVe healing ointment or Vaseline. CeraVe products contain ceramides and filaggrin proteins that can help to maintain the body's moisture layer.    After cleansing or washing, always apply moisturizer immediately after drying off (pat dry only) for best effect.    Protection while hydrating    Do not overuse soap. Unless you have been sweating extensively, just apply soap to groin and armpits.    Recommended products for body include: OTC unscented Dove for sensitive skin or OTC Vanicream cleansing bar.    Recommended facial cleansers include: OTC CeraVe hydrating facial cleanser or OTC Cetaphil daily facial cleanser.    Always try to wear rubber gloves when washing dishes or cleaning.    Avoid use of    Scented/perfumed products    Irritating clothing (wool, new jeans, new/unwashed clothing, scratchy synthetics)    Neosporin or triple antibiotic topical products    Products containing aloe, herbs, Vitamin E, or other \"natural ingredients\".    Dryer sheets or fabric softeners (while symptoms are present)    If a topical medication is prescribed, apply topical prescription first, followed by use of moisturizing product.    Wear socks and/or cotton gloves (can purchase at any retail pharmacy) after application of moisturizer nightly at bedtime and wear until the morning.      The patient was counseled about sunscreens and sun avoidance. The patient was counseled to check the skin regularly and report any lesion that is new, changing, itching, scabbing, bleeding or otherwise bothersome. The patient was discharged ambulatory and in stable " condition.    TT: 25 minutes.  CT: 15 minutes.    The information in this document, created by the medical scribe for me, accurately reflects the services I personally performed and the decisions made by me. I have reviewed and approved this document for accuracy prior to leaving the patient care area.  January 21, 2020 12:05 PM  Tamara Gonzalez MD  [facility] [place]    Again, thank you for allowing me to participate in the care of your patient.        Sincerely,        Tamara Gonzalez MD

## 2020-03-08 ENCOUNTER — NURSE TRIAGE (OUTPATIENT)
Dept: NURSING | Facility: CLINIC | Age: 60
End: 2020-03-08

## 2020-03-08 NOTE — TELEPHONE ENCOUNTER
Symptoms started yesterday.  Started with a scratchy throat. Gargled with salt water.   Low grade temp: .1 Oral   +Cough, muscle pain, low temp, post nasal drip  Denies sinus pressure.     Pt was advised of home care recommendations per care advice in protocol.   Pt stated understanding and denied any further questions or concerns.     Swetha Jackson RN   [facility] [place] RN Triage         Additional Information    Negative: [1] Difficulty breathing AND [2] not severe AND [3] not from stuffy nose (e.g., not relieved by cleaning out the nose)    Negative: Severe difficulty breathing (e.g., struggling for each breath, speaks in single words)    Negative: Bluish (or gray) lips or face now    Negative: Shock suspected (e.g., cold/pale/clammy skin, too weak to stand, low BP, rapid pulse)    Negative: Sounds like a life-threatening emergency to the triager    Negative: [1] Sounds like a cold AND [2] no fever    Negative: [1] Cough with sputum AND [2] no fever    Negative: [1] Dry cough (no sputum) sputum AND [2] no fever    Negative: [1] Throat pain AND [2] no fever    Negative: Influenza vaccine reaction is suspected    Negative: Chest pain  (Exception: MILD central chest pain, present only when coughing)    Negative: [1] Headache AND [2] stiff neck (can't touch chin to chest)    Negative: Patient sounds very sick or weak to the triager    Negative: Fever > 104 F (40 C)    Negative: [1] Fever > 101 F (38.3 C) AND [2] age > 60    Negative: [1] Fever > 100.0 F (37.8 C) AND [2] diabetes mellitus or weak immune system (e.g., HIV positive, cancer chemo, splenectomy, chronic steroids)    Negative: [1] Fever > 100.0 F (37.8 C) AND [2] bedridden (e.g., nursing home patient, CVA, chronic illness, recovering from surgery)    Negative: Patient is HIGH RISK (e.g., age > 64 years, pregnant, HIV+, or chronic medical condition)    Negative: Fever present > 3 days (72 hours)    Negative: [1] Fever returns after gone for over 24  hours AND [2] symptoms worse or not improved    Negative: [1] Using nasal washes and pain medicine > 24 hours AND [2] sinus pain (around cheekbone or eye) persists    Negative: Earache    Negative: [1] Nasal discharge AND [2] present > 10 days    Negative: Cough present > 3 weeks    [1] Probable influenza (fever) with no complications AND [2] NOT HIGH RISK    Negative: [1] Patient is NOT HIGH RISK AND [2] strongly requests antiviral medicine AND [3] flu symptoms present < 48 hours    Protocols used: INFLUENZA - SEASONAL-A-

## 2020-08-28 DIAGNOSIS — E55.9 VITAMIN D DEFICIENCY: ICD-10-CM

## 2020-08-28 DIAGNOSIS — Z20.822 COVID-19 RULED OUT: ICD-10-CM

## 2020-08-28 DIAGNOSIS — Z00.00 ENCOUNTER FOR WELLNESS EXAMINATION: ICD-10-CM

## 2020-08-28 DIAGNOSIS — Z13.6 CARDIOVASCULAR SCREENING; LDL GOAL LESS THAN 160: ICD-10-CM

## 2020-08-28 LAB
ALBUMIN SERPL-MCNC: 3.9 G/DL (ref 3.4–5)
ALP SERPL-CCNC: 56 U/L (ref 40–150)
ALT SERPL W P-5'-P-CCNC: 37 U/L (ref 0–50)
ANION GAP SERPL CALCULATED.3IONS-SCNC: 3 MMOL/L (ref 3–14)
AST SERPL W P-5'-P-CCNC: 20 U/L (ref 0–45)
BILIRUB SERPL-MCNC: 0.6 MG/DL (ref 0.2–1.3)
BUN SERPL-MCNC: 12 MG/DL (ref 7–30)
CALCIUM SERPL-MCNC: 8.8 MG/DL (ref 8.5–10.1)
CHLORIDE SERPL-SCNC: 110 MMOL/L (ref 94–109)
CHOLEST SERPL-MCNC: 212 MG/DL
CO2 SERPL-SCNC: 26 MMOL/L (ref 20–32)
CREAT SERPL-MCNC: 0.74 MG/DL (ref 0.52–1.04)
DEPRECATED CALCIDIOL+CALCIFEROL SERPL-MC: 25 UG/L (ref 20–75)
GFR SERPL CREATININE-BSD FRML MDRD: 87 ML/MIN/{1.73_M2}
GLUCOSE SERPL-MCNC: 91 MG/DL (ref 70–99)
HDLC SERPL-MCNC: 56 MG/DL
LDLC SERPL CALC-MCNC: 134 MG/DL
NONHDLC SERPL-MCNC: 156 MG/DL
POTASSIUM SERPL-SCNC: 4 MMOL/L (ref 3.4–5.3)
PROT SERPL-MCNC: 7.3 G/DL (ref 6.8–8.8)
SODIUM SERPL-SCNC: 139 MMOL/L (ref 133–144)
TRIGL SERPL-MCNC: 110 MG/DL

## 2020-08-28 PROCEDURE — 86769 SARS-COV-2 COVID-19 ANTIBODY: CPT | Performed by: PEDIATRICS

## 2020-08-28 PROCEDURE — 82306 VITAMIN D 25 HYDROXY: CPT | Performed by: PEDIATRICS

## 2020-08-28 PROCEDURE — 80053 COMPREHEN METABOLIC PANEL: CPT | Performed by: PEDIATRICS

## 2020-08-28 PROCEDURE — 80061 LIPID PANEL: CPT | Performed by: PEDIATRICS

## 2020-08-28 PROCEDURE — 36415 COLL VENOUS BLD VENIPUNCTURE: CPT | Performed by: PEDIATRICS

## 2020-09-01 LAB
COVID-19 SPIKE RBD ABY TITER: NORMAL
COVID-19 SPIKE RBD ABY: NEGATIVE

## 2020-11-16 ENCOUNTER — HEALTH MAINTENANCE LETTER (OUTPATIENT)
Age: 60
End: 2020-11-16

## 2020-12-29 ENCOUNTER — HOSPITAL ENCOUNTER (OUTPATIENT)
Dept: MAMMOGRAPHY | Facility: CLINIC | Age: 60
Discharge: HOME | End: 2020-12-29
Attending: PEDIATRICS | Admitting: PEDIATRICS
Payer: COMMERCIAL

## 2020-12-29 DIAGNOSIS — Z12.31 VISIT FOR SCREENING MAMMOGRAM: ICD-10-CM

## 2020-12-29 PROCEDURE — 77063 BREAST TOMOSYNTHESIS BI: CPT

## 2021-02-09 ENCOUNTER — DOCUMENTATION ONLY (OUTPATIENT)
Dept: OTHER | Facility: CLINIC | Age: 61
End: 2021-02-09

## 2021-06-24 ENCOUNTER — OFFICE VISIT (OUTPATIENT)
Dept: URGENT CARE | Facility: URGENT CARE | Age: 61
End: 2021-06-24
Payer: COMMERCIAL

## 2021-06-24 VITALS
HEART RATE: 58 BPM | TEMPERATURE: 97.2 F | BODY MASS INDEX: 22.42 KG/M2 | SYSTOLIC BLOOD PRESSURE: 118 MMHG | WEIGHT: 154 LBS | OXYGEN SATURATION: 99 % | DIASTOLIC BLOOD PRESSURE: 71 MMHG

## 2021-06-24 DIAGNOSIS — R22.32 ARM MASS, LEFT: ICD-10-CM

## 2021-06-24 DIAGNOSIS — M25.512 LEFT SHOULDER PAIN, UNSPECIFIED CHRONICITY: Primary | ICD-10-CM

## 2021-06-24 PROCEDURE — 99213 OFFICE O/P EST LOW 20 MIN: CPT | Performed by: FAMILY MEDICINE

## 2021-06-24 NOTE — PROGRESS NOTES
"SUBJECTIVE:  Chief Complaint   Patient presents with     Urgent Care     L SHOULDER PAIN NO KNOWN INJURY X1 WEEK FEELS MUSCLE KNOT, THIS MORNING FELT A BUMP ON THE ARM      Latesha Ruiz is a 61 year old female who presents with a chief complaint of left shoulder pain.    Woke up about 1 week ago and noted left shoulder pain, thought that she slept wrong and did not think anything else about it.  Gets intermittent neck muscle \"knots\", more in trapezius area.  Has taken ibuprofen intermittently and did seem to help.  Able to move shoulder without problems.  Denies any trauma or excessive use.  Notice more of a burning feeling in shoulder and now noted a bump on mid arm.  No pain at site of bump.    Worried that may have a blood clot in arm as she knew someone else who had a lump and  due to clot.  Also worried that symptoms is due to cardiac, denies any SOB or chest pain.    Will be going out of town for 1 week     Past Medical History:   Diagnosis Date     AK (actinic keratosis) 2013     Allergic rhinitis, cause unspecified      Arthritis ?    mild in thumbs, jaw     Closed fracture of metacarpal bone(s), site unspecified     Right 5th metacarpal fx--caught in a door     Closed fracture of rib(s), unspecified ~     Contact dermatitis and other eczema, due to unspecified cause      Disorder of bone and cartilage, unspecified ~2000    T-score -2.00 in L-spine in      Lumbago      Premature menopause     Elevated FSH/LH in --normal brain MRI 2001     No current outpatient medications on file.     Social History     Tobacco Use     Smoking status: Never Smoker     Smokeless tobacco: Never Used   Substance Use Topics     Alcohol use: No     Frequency: Never     Binge frequency: Never       ROS:  Review of systems negative except as stated above.    EXAM:   /71   Pulse 58   Temp 97.2  F (36.2  C)   Wt 69.9 kg (154 lb)   SpO2 99%   BMI 22.42 kg/m    M/S Exam:left shoulder - " ROM intact, non-tender, no erythema.  Left humerus with soft, slightly mobile, oval shaped nodule, nontender, no erythema.  No edema on arm  GENERAL APPEARANCE: healthy, alert and no distress  NECK: supple, mild tender to palpation on left trapezius, FROM   EXTREMITIES: peripheral pulses normal  PSYCH: alert, affect bright    X-RAY was not done.    ASSESSMENT/PLAN:  (M25.512) Left shoulder pain, unspecified chronicity  (primary encounter diagnosis)  Plan: ibuprofen, ice    (R22.32) Arm mass, left  Comment: lipoma vs cyst  Plan: monitor      Reassurance given that exam is not concerning for blood clot or cardiac etiology, suspect more musculoskeletal in nature.  Discussed possible rotator cuff tendinitis as more localized in shoulder area and encourage gentle range of motion to prevent frozen shoulder.  Encourage ice, ibuprofen.  Deferred Xray as no trauma and low liklihood of fracture.  Offered muscle relaxant for neck muscle spasm but did declined.      Discussed mass on arm as possible lipoma vs cyst.  Okay to monitor for now, no concerns for infection.  Can follow up with dermatology or primary provider for excision.    Follow up with primary provider after returns from trip.    Brody James MD  June 24, 2021 10:42 AM

## 2021-06-24 NOTE — PATIENT INSTRUCTIONS
Okay for tylenol and ibuprofen for discomfort  Ice to area of bone location and heat to area of muscles      Patient Education     Understanding Rotator Cuff Tendonitis    The rotator cuff is a group of 4 muscles and tendons in the shoulder. Tendons are tough tissues that connect muscles to bone. The 4 muscles and their tendons form a  cuff  around the head of the upper arm bone. The rotator cuff connects the upper arm to the shoulder blade. It keeps the shoulder joint stable and gives it strength. It also helps the shoulder joint with certain movements. These include reaching the arm over the head and rotating the arm.  If tendons are injured or strained, they may get irritated and swollen (inflamed). This is called tendonitis. Rotator cuff tendonitis may cause shoulder pain. It may make it hard to move your shoulder.  What causes rotator cuff tendonitis?  When the rotator cuff tendons are injured or overworked it causes tendonitis. The most common cause of injury is repetitive overhead activities. These can be work-related activities such as reaching, pushing, or lifting. Or they can be sports-related activities such as throwing, swimming, or lifting weights.  Symptoms of rotator cuff tendonitis  Pain on the side of the upper arm at the shoulder is the most common symptom. Pain may get worse with overhead movements. Or it can get worse when you raise the arm above shoulder level. It may also hurt to lie on the shoulder at night.  Treatment for rotator cuff tendonitis  Treatment may include:    Active rest. This lets the rotator cuff heal. Active rest means using your arm and shoulder, but not doing activities that cause pain. These might be reaching overhead or sleeping on the shoulder.    Cold packs. Putting ice packs on the shoulder helps reduce swelling and ease pain.    Medicines. Prescription or over-the-counter medicines can help ease pain and swelling. NSAIDs (nonsteroidal anti-inflammatory drugs) are the  most common medicines used. They may be taken as pills. Or they may be put on the skin as a gel, cream, or patch.    Arm and shoulder exercises. These help keep the shoulder joint moving as it heals. They also help improve the strength of muscles around the joint.  Possible complications  You may be tempted to stop using your shoulder completely to prevent pain. But doing so may lead to a condition called frozen shoulder. To help prevent this, follow instructions you are given for active rest and for doing exercises to help your shoulder heal.  When to call your healthcare provider  Call your healthcare provider right away if you have any of these:    Fever of 100.4 F (38 C) or higher, or as advised by your healthcare provider    Chills    Symptoms that don t get better, or get worse    New symptoms  Pam last reviewed this educational content on 6/1/2019 2000-2021 [employer]. All rights reserved. This information is not intended as a substitute for professional medical care. Always follow your healthcare professional's instructions.           Patient Education     Understanding a Lipoma     A lipoma is a lump under the skin that s made of fat. It s not cancer (benign). It feels soft like rubber when you press it, and in most cases it doesn t hurt. Some people have more than one. A lipoma grows slowly over time and doesn t cause many problems. Lipomas occur most often in adults from ages 40 to 60. They are more common in men.   How to say it  Ly-[id number]-bo  What causes a lipoma?  Experts don't know yet what causes lipomas. They are still learning more. They may be partly caused by a problem in a gene. They can run in families. Familial multiple lipomatosis is when 2 or more family members have many lipomas.  Symptoms of a lipoma  The main symptom of a lipoma is a soft lump under the skin that doesn t hurt unless it is pressing on a nerve. It may be small, around 1/4 inch across. Or it may be  larger, up to 4 inches across or more.  There are different kinds of lipomas. The most common kind occurs under the skin of the shoulders, chest, back, belly, or under the arms. In some cases, a lipoma can occur on the legs. In rare cases, one may occur deeper in the body or in a muscle.  Treatment for a lipoma  In most cases, a lipoma doesn t need treatment. Your healthcare provider may look at it during regular checkups to see if it changes.  But if the lipoma is painful or you want it removed for cosmetic reasons, it can be removed with surgery. The surgery is called excision. The lipoma will most likely not grow back after surgery. During surgery, the area around the lipoma is numbed. If you have a deep lipoma, you may need medicine to numb a larger area (regional anesthesia). Or you may need medicine to put you to sleep during the procedure (general anesthesia). Then the doctor makes a cut over the area of the lipoma. He or she removes the lump of fat. The cut is then closed with stitches.  Possible complications of a lipoma  A large lipoma inside the body can press on organs, nerves, or other tissues and cause problems. For example, it can cause problems with breathing or digestion.  Living with a lipoma  Your healthcare provider may look at the lipoma during regular checkups to see if it changes or is causing problems.  When to call your healthcare provider  Call your healthcare provider right away if you have any of these:    Lipoma that grows quickly, causes pain, or feels hard    New lipomas  [employer] last reviewed this educational content on 11/1/2018 2000-2021 [employer]. All rights reserved. This information is not intended as a substitute for professional medical care. Always follow your healthcare professional's instructions.

## 2021-07-15 ENCOUNTER — TELEPHONE (OUTPATIENT)
Dept: PEDIATRICS | Facility: CLINIC | Age: 61
End: 2021-07-15

## 2021-07-15 DIAGNOSIS — R22.30 MASS OF UPPER EXTREMITY, UNSPECIFIED LATERALITY: Primary | ICD-10-CM

## 2021-07-15 NOTE — TELEPHONE ENCOUNTER
Ph. [phone] can leave detailed message    Patient calling in regards to a lump she has on her arm.  Noted in office visit from 06/24/21     Discussed mass on arm as possible lipoma vs cyst.  Okay to monitor for now, no concerns for infection.  Can follow up with dermatology or primary provider for excision.     Follow up with primary provider after returns from trip.     Brody James MD  June 24, 2021 10:42 AM     Patient states it is now bothersome and wondering whom to schedule with to get removed.  Dermatology is out till 09/30    Please advise    Ella Abreu

## 2021-07-16 NOTE — TELEPHONE ENCOUNTER
Based on the size, I would recommend having general surgery look at it instead.  Referral placed for general surgery - they will reach out to patient to schedule.  Please let her know.    Maday Anaya MD

## 2021-07-16 NOTE — TELEPHONE ENCOUNTER
Pt returned call following VM from Esther ZARAGOZA. The patient noted that she received a call for the scheduling of a surgical consult, she was on the phone with them and that is why she did not answer Esther's call.    Reviewed previous documentation, no further action needed. I informed the patient that Esther was calling to relay the information re: need for surgical consult (which she has now scheduled).    Swetha Rm, AGUILAR on 7/16/2021 at 1:27 PM

## 2021-07-16 NOTE — TELEPHONE ENCOUNTER
Forwarded to triage to get a little more information about the size of the cyst/lipoma on her arm - no measurements available in the reviewed notes.   Depending on the size, will send to dermatology or general surgery for excision.    Maday Anaya MD

## 2021-07-16 NOTE — TELEPHONE ENCOUNTER
Patient thinks that the mass is roughly the size of a quarter. Patient denies any redness, pain or fever.     A few weeks ago had issus with shoulder. During that time when she was in pain she massaged her shoulder and she felt the mass. The mass is of the top of her arm toward her chest right where her short sleeve would end.     Patient was concerned that the lump could possibly be cancerous. Discussed with patient that Dr. James thought it might be a lipoma vs cyst and what those ment and the possible risk of infection with a cyst. Reviewed signs and symptoms of infection with patient. She will call back if she develops them.     Patient will call and schedule with dermatology first available.     Esther Gill RN on 7/16/2021 at 9:16 AM

## 2021-07-16 NOTE — TELEPHONE ENCOUNTER
Called and left message for patient requesting a call back. Esther Gill RN on 7/16/2021 at 1:13 PM

## 2021-07-19 ENCOUNTER — OFFICE VISIT (OUTPATIENT)
Dept: SURGERY | Facility: CLINIC | Age: 61
End: 2021-07-19
Payer: COMMERCIAL

## 2021-07-19 VITALS
WEIGHT: 154 LBS | OXYGEN SATURATION: 97 % | HEIGHT: 70 IN | SYSTOLIC BLOOD PRESSURE: 104 MMHG | HEART RATE: 63 BPM | RESPIRATION RATE: 16 BRPM | BODY MASS INDEX: 22.05 KG/M2 | DIASTOLIC BLOOD PRESSURE: 62 MMHG

## 2021-07-19 DIAGNOSIS — R22.32 SUBCUTANEOUS MASS OF LEFT UPPER EXTREMITY: Primary | ICD-10-CM

## 2021-07-19 DIAGNOSIS — R22.32 ARM MASS, LEFT: Primary | ICD-10-CM

## 2021-07-19 PROCEDURE — 99203 OFFICE O/P NEW LOW 30 MIN: CPT | Performed by: SURGERY

## 2021-07-19 ASSESSMENT — MIFFLIN-ST. JEOR: SCORE: 1330.85

## 2021-07-19 NOTE — PROGRESS NOTES
HPI:  Latesha presents today for a subcutaneous mass on her upper left arm for the past 3 weeks. She denies trauma at to the site.  She has had  no drainage from the site in the past.  She has no history of  infection.  It is not painful.  It's size is stable. She first noticed this when she developed some shoulder pain. She is concerned over malignancy.  She also has a nodule over her left forearm which has been stable.    PE:  There were no vitals taken for this visit.  General appearance: well-nourished, no apparent distress  Lungs: respirations unlabored  Neurologic: nonfocal, grossly intact times four extremities, alert and oriented times three  Psychiatric: Mood and affect are appropriate  Skin: There is a 2.5 cm subcutaneous mass on the left upper arm.  The overlying skin is  normal.  It is non-tender.  There is a second 2.0 cm nodule on the dorsal mid forearm that is also nontender.    Impression: Subcutaneous nodules left upper arm and forearm         Plan:  Discussed options including observation with measurements, ultrasound, MRI and surgical excision.  We discussed potential for numbness and nerve injury with surgical excision.  Discussed with radiology, they feel MRI is the most definitive imaging test to assure that these are benign.  She would like to proceed with the MRI scan in the hopes of avoiding surgery as she is asymptomatic at this point.    25  minutes spent on the date of the encounter and chart review, review of test results, patient visit, physical exam, education, counseling, development of care plan and documentation.    Gus Cronin MD    Please route or send letter to:  Primary Care Provider (PCP) and Include Progress Note

## 2021-07-19 NOTE — PATIENT INSTRUCTIONS
MRI LEFT ARM     Date: 8/5/2021  Time: 9:45 AM   Location: [facility]  [address] [address]  [address]       Please check in at the hospital  at 9:30 AM

## 2021-07-19 NOTE — LETTER
2021    RE: Latesha Ruiz, : 1960      HPI:  Latesha presents today for a subcutaneous mass on her upper left arm for the past 3 weeks. She denies trauma at to the site.  She has had  no drainage from the site in the past.  She has no history of  infection.  It is not painful.  It's size is stable. She first noticed this when she developed some shoulder pain. She is concerned over malignancy.  She also has a nodule over her left forearm which has been stable.     PE:  There were no vitals taken for this visit.  General appearance: well-nourished, no apparent distress  Lungs: respirations unlabored  Neurologic: nonfocal, grossly intact times four extremities, alert and oriented times three  Psychiatric: Mood and affect are appropriate  Skin: There is a 2.5 cm subcutaneous mass on the left upper arm.  The overlying skin is  normal.  It is non-tender.  There is a second 2.0 cm nodule on the dorsal mid forearm that is also nontender.     Impression: Subcutaneous nodules left upper arm and forearm         Plan:  Discussed options including observation with measurements, ultrasound, MRI and surgical excision.  We discussed potential for numbness and nerve injury with surgical excision.  Discussed with radiology, they feel MRI is the most definitive imaging test to assure that these are benign.  She would like to proceed with the MRI scan in the hopes of avoiding surgery as she is asymptomatic at this point.          Gus Cronin MD

## 2021-08-05 ENCOUNTER — HOSPITAL ENCOUNTER (OUTPATIENT)
Dept: MRI IMAGING | Facility: CLINIC | Age: 61
End: 2021-08-05
Attending: SURGERY
Payer: COMMERCIAL

## 2021-08-05 DIAGNOSIS — R22.32 ARM MASS, LEFT: ICD-10-CM

## 2021-08-05 PROCEDURE — 73218 MRI UPPER EXTREMITY W/O DYE: CPT | Mod: 26 | Performed by: RADIOLOGY

## 2021-08-05 PROCEDURE — A9585 GADOBUTROL INJECTION: HCPCS | Performed by: SURGERY

## 2021-08-05 PROCEDURE — 73218 MRI UPPER EXTREMITY W/O DYE: CPT | Mod: LT,XS

## 2021-08-05 PROCEDURE — 73218 MRI UPPER EXTREMITY W/O DYE: CPT | Mod: LT

## 2021-08-05 PROCEDURE — 255N000002 HC RX 255 OP 636: Performed by: SURGERY

## 2021-08-05 RX ORDER — GADOBUTROL 604.72 MG/ML
7.5 INJECTION INTRAVENOUS ONCE
Status: DISCONTINUED | OUTPATIENT
Start: 2021-08-05 | End: 2021-08-06 | Stop reason: HOSPADM

## 2021-08-09 NOTE — RESULT ENCOUNTER NOTE
Patient was notified of these results.  Normal fat is what would be expected to be seen on an MRI scan with a lipoma.  Since she is having minimal/no symptoms at this time, she prefers to observe.  I encouraged her to have this rechecked should she develop any symptoms or any significant growth.    Gus Cronin MD  8/9/2021 4:01 PM

## 2021-09-18 ENCOUNTER — HEALTH MAINTENANCE LETTER (OUTPATIENT)
Age: 61
End: 2021-09-18

## 2021-09-19 ASSESSMENT — ENCOUNTER SYMPTOMS
MYALGIAS: 1
BREAST MASS: 0

## 2021-09-21 ENCOUNTER — OFFICE VISIT (OUTPATIENT)
Dept: PEDIATRICS | Facility: CLINIC | Age: 61
End: 2021-09-21
Payer: COMMERCIAL

## 2021-09-21 VITALS
DIASTOLIC BLOOD PRESSURE: 58 MMHG | OXYGEN SATURATION: 98 % | BODY MASS INDEX: 22.71 KG/M2 | HEART RATE: 75 BPM | TEMPERATURE: 97.5 F | HEIGHT: 69 IN | WEIGHT: 153.3 LBS | SYSTOLIC BLOOD PRESSURE: 96 MMHG | RESPIRATION RATE: 20 BRPM

## 2021-09-21 DIAGNOSIS — M25.551 HIP PAIN, RIGHT: ICD-10-CM

## 2021-09-21 DIAGNOSIS — M21.70 LEG LENGTH DISCREPANCY: ICD-10-CM

## 2021-09-21 DIAGNOSIS — Z00.00 ROUTINE GENERAL MEDICAL EXAMINATION AT A HEALTH CARE FACILITY: Primary | ICD-10-CM

## 2021-09-21 DIAGNOSIS — M81.0 OSTEOPOROSIS, UNSPECIFIED OSTEOPOROSIS TYPE, UNSPECIFIED PATHOLOGICAL FRACTURE PRESENCE: ICD-10-CM

## 2021-09-21 DIAGNOSIS — Z12.31 VISIT FOR SCREENING MAMMOGRAM: ICD-10-CM

## 2021-09-21 DIAGNOSIS — Z78.0 ASYMPTOMATIC POSTMENOPAUSAL STATUS: ICD-10-CM

## 2021-09-21 DIAGNOSIS — E55.9 VITAMIN D DEFICIENCY: ICD-10-CM

## 2021-09-21 PROCEDURE — 99213 OFFICE O/P EST LOW 20 MIN: CPT | Mod: 25 | Performed by: PEDIATRICS

## 2021-09-21 PROCEDURE — 99396 PREV VISIT EST AGE 40-64: CPT | Mod: 25 | Performed by: PEDIATRICS

## 2021-09-21 PROCEDURE — 90471 IMMUNIZATION ADMIN: CPT | Performed by: PEDIATRICS

## 2021-09-21 PROCEDURE — 84443 ASSAY THYROID STIM HORMONE: CPT | Performed by: PEDIATRICS

## 2021-09-21 PROCEDURE — 36415 COLL VENOUS BLD VENIPUNCTURE: CPT | Performed by: PEDIATRICS

## 2021-09-21 PROCEDURE — 80061 LIPID PANEL: CPT | Performed by: PEDIATRICS

## 2021-09-21 PROCEDURE — 82306 VITAMIN D 25 HYDROXY: CPT | Performed by: PEDIATRICS

## 2021-09-21 PROCEDURE — 80053 COMPREHEN METABOLIC PANEL: CPT | Performed by: PEDIATRICS

## 2021-09-21 PROCEDURE — 90682 RIV4 VACC RECOMBINANT DNA IM: CPT | Performed by: PEDIATRICS

## 2021-09-21 ASSESSMENT — ENCOUNTER SYMPTOMS
BREAST MASS: 0
MYALGIAS: 1

## 2021-09-21 ASSESSMENT — MIFFLIN-ST. JEOR: SCORE: 1319.74

## 2021-09-21 NOTE — PROGRESS NOTES
SUBJECTIVE:   CC: Latesha Ruiz is an 61 year old woman who presents for preventive health visit.   Patient has been advised of split billing requirements and indicates understanding: Yes     Healthy Habits:     Getting at least 3 servings of Calcium per day:  NO    Bi-annual eye exam:  NO    Dental care twice a year:  Yes    Sleep apnea or symptoms of sleep apnea:  None    Diet:  Regular (no restrictions)    Frequency of exercise:  2-3 days/week    Duration of exercise:  15-30 minutes    Taking medications regularly:  No    PHQ-2 Total Score: 0        Today's PHQ-2 Score:   PHQ-2 ( 1999 Pf[employer]) 9/19/2021   Q1: Little interest or pleasure in doing things 0   Q2: Feeling down, depressed or hopeless 0   PHQ-2 Score 0   Q1: Little interest or pleasure in doing things Not at all   Q2: Feeling down, depressed or hopeless Not at all   PHQ-2 Score 0       Abuse: Current or Past (Physical, Sexual or Emotional) - No  Do you feel safe in your environment? Yes      Social History     Tobacco Use     Smoking status: Never Smoker     Smokeless tobacco: Never Used   Substance Use Topics     Alcohol use: No         Alcohol Use 9/19/2021   Prescreen: >3 drinks/day or >7 drinks/week? Not Applicable   Prescreen: >3 drinks/day or >7 drinks/week? -       Reviewed orders with patient.  Reviewed health maintenance and updated orders accordingly - Yes  Lab work is in process    Breast Cancer Screening:    FHS-7:   Breast CA Risk Assessment (FHS-7) 9/19/2021   Did any of your first-degree relatives have breast or ovarian cancer? No   Did any of your relatives have bilateral breast cancer? No   Did any man in your family have breast cancer? No   Did any woman in your family have breast and ovarian cancer? No   Did any woman in your family have breast cancer before age 50 y? Unknown   Do you have 2 or more relatives with breast and/or ovarian cancer? Yes   Do you have 2 or more relatives with breast and/or bowel cancer? Yes  "      Mammogram Screening: Recommended mammography every 1-2 years with patient discussion and risk factor consideration  Pertinent mammograms are reviewed under the imaging tab.    History of abnormal Pap smear: NO - age 30- 65 PAP every 3 years recommended  PAP / HPV Latest Ref Rng & Units 9/27/2019 8/1/2016 11/3/2014   PAP (Historical) - NIL NIL NIL   HPV16 NEG:Negative Negative - -   HPV18 NEG:Negative Negative - -   HRHPV NEG:Negative Negative - -     Reviewed and updated as needed this visit by clinical staff  Tobacco  Allergies  Meds   Med Hx  Surg Hx  Fam Hx  Soc Hx        Reviewed and updated as needed this visit by Provider                  Osteoporosis - has not tolerated actonel or fosamax due to GI side effects    Retired in June and is spending time focusing on her son's health needs    LBP - chronic issues with intermittent low back pain and right groin issues - concern about leg length discrepancy that is contributing.    Great questions around possible reasons why liver function tests might be slightly elevated in the past - most recently normal        Review of Systems   Breasts:  Negative for tenderness, breast mass and discharge.   Genitourinary: Negative for pelvic pain, vaginal bleeding and vaginal discharge.   Musculoskeletal: Positive for myalgias.    ROS: 10 point ROS neg other than the symptoms noted above in the HPI.       OBJECTIVE:   BP 96/58 (BP Location: Right arm, Patient Position: Sitting, Cuff Size: Adult Regular)   Pulse 75   Temp 97.5  F (36.4  C) (Tympanic)   Resp 20   Ht 1.753 m (5' 9\")   Wt 69.5 kg (153 lb 4.8 oz)   SpO2 98%   BMI 22.64 kg/m     Wt Readings from Last 4 Encounters:   09/21/21 69.5 kg (153 lb 4.8 oz)   07/19/21 69.9 kg (154 lb)   06/24/21 69.9 kg (154 lb)   09/27/19 70.8 kg (156 lb)       Physical Exam  GENERAL: healthy, alert and no distress  EYES: Eyes grossly normal to inspection, PERRL and conjunctivae and sclerae normal  HENT: ear canals and " TM's normal, nose and mouth without ulcers or lesions  NECK: no adenopathy, no asymmetry, masses, or scars and thyroid normal to palpation  RESP: lungs clear to auscultation - no rales, rhonchi or wheezes  BREAST: normal without masses, tenderness or nipple discharge and no palpable axillary masses or adenopathy  CV: regular rate and rhythm, normal S1 S2, no S3 or S4, no murmur, click or rub, no peripheral edema and peripheral pulses strong  ABDOMEN: soft, nontender, no hepatosplenomegaly, no masses and bowel sounds normal  MS: right iliac crest measures .75 inches higher than left, tender to palpation in right inguinal canal with tense musculature in this area - doesn't feel like a mass, great hip ROM  SKIN: no suspicious lesions or rashes  NEURO: Normal strength and tone, mentation intact and speech normal  PSYCH: mentation appears normal, affect normal/bright    Diagnostic Test Results:  pending    ASSESSMENT/PLAN:       ICD-10-CM    1. Routine general medical examination at a health care facility  Z00.00 Lipid panel reflex to direct LDL Fasting     Comprehensive metabolic panel (BMP + Alb, Alk Phos, ALT, AST, Total. Bili, TP)     Vitamin D Deficiency     TSH with free T4 reflex   2. Osteoporosis, unspecified osteoporosis type, unspecified pathological fracture presence  M81.0 Vitamin D Deficiency     DX Hip/Pelvis/Spine    Due for repeat DEXA, previously had poor tolerance due to GI side effects.      3. Vitamin D deficiency  E55.9 Vitamin D Deficiency   4. Leg length discrepancy  M21.70 Orthopedic  Referral  Recommend sports med eval with Dr Wallace - orders placed   5. Hip pain, right  M25.551 Orthopedic  Referral   6. Asymptomatic postmenopausal status  Z78.0 DX Hip/Pelvis/Spine   7. Visit for screening mammogram  Z12.31 MA Screen Bilateral w/Kwesi         COUNSELING:  Reviewed preventive health counseling, as reflected in patient instructions    Estimated body mass index is 22.64 kg/m  as  "calculated from the following:    Height as of this encounter: 1.753 m (5' 9\").    Weight as of this encounter: 69.5 kg (153 lb 4.8 oz).        She reports that she has never smoked. She has never used smokeless tobacco.      Counseling Resources:  ATP IV Guidelines  Pooled Cohorts Equation Calculator  Breast Cancer Risk Calculator  BRCA-Related Cancer Risk Assessment: FHS-7 Tool  FRAX Risk Assessment  ICSI Preventive Guidelines  Dietary Guidelines for Americans, 2010  USDA's MyPlate  ASA Prophylaxis  Lung CA Screening    Maday Anaya MD  [facility]  "

## 2021-09-21 NOTE — PATIENT INSTRUCTIONS
Call for visit with Dr Wallace at [facility] [place] today    Flu shot today    Mammogram and DEXA scan ordered - schedule when you can    Keep up activity          Preventive Health Recommendations  Female Ages 50 - 64    Yearly exam: See your health care provider every year in order to  o Review health changes.   o Discuss preventive care.    o Review your medicines if your doctor has prescribed any.      Get a Pap test every three years (unless you have an abnormal result and your provider advises testing more often).    If you get Pap tests with HPV test, you only need to test every 5 years, unless you have an abnormal result.     You do not need a Pap test if your uterus was removed (hysterectomy) and you have not had cancer.    You should be tested each year for STDs (sexually transmitted diseases) if you're at risk.     Have a mammogram every 1 to 2 years.    Have a colonoscopy at age 50, or have a yearly FIT test (stool test). These exams screen for colon cancer.      Have a cholesterol test every 5 years, or more often if advised.    Have a diabetes test (fasting glucose) every three years. If you are at risk for diabetes, you should have this test more often.     If you are at risk for osteoporosis (brittle bone disease), think about having a bone density scan (DEXA).    Shots: Get a flu shot each year. Get a tetanus shot every 10 years.    Nutrition:     Eat at least 5 servings of fruits and vegetables each day.    Eat whole-grain bread, whole-wheat pasta and brown rice instead of white grains and rice.    Get adequate Calcium and Vitamin D.     Lifestyle    Exercise at least 150 minutes a week (30 minutes a day, 5 days a week). This will help you control your weight and prevent disease.    Limit alcohol to one drink per day.    No smoking.     Wear sunscreen to prevent skin cancer.     See your dentist every six months for an exam and cleaning.    See your eye doctor every 1 to 2 years.

## 2021-09-22 LAB
ALBUMIN SERPL-MCNC: 3.8 G/DL (ref 3.4–5)
ALP SERPL-CCNC: 52 U/L (ref 40–150)
ALT SERPL W P-5'-P-CCNC: 38 U/L (ref 0–50)
ANION GAP SERPL CALCULATED.3IONS-SCNC: 1 MMOL/L (ref 3–14)
AST SERPL W P-5'-P-CCNC: 18 U/L (ref 0–45)
BILIRUB SERPL-MCNC: 0.6 MG/DL (ref 0.2–1.3)
BUN SERPL-MCNC: 11 MG/DL (ref 7–30)
CALCIUM SERPL-MCNC: 7.9 MG/DL (ref 8.5–10.1)
CHLORIDE BLD-SCNC: 109 MMOL/L (ref 94–109)
CHOLEST SERPL-MCNC: 205 MG/DL
CO2 SERPL-SCNC: 29 MMOL/L (ref 20–32)
CREAT SERPL-MCNC: 0.87 MG/DL (ref 0.52–1.04)
DEPRECATED CALCIDIOL+CALCIFEROL SERPL-MC: 23 UG/L (ref 20–75)
FASTING STATUS PATIENT QL REPORTED: YES
GFR SERPL CREATININE-BSD FRML MDRD: 72 ML/MIN/1.73M2
GLUCOSE BLD-MCNC: 86 MG/DL (ref 70–99)
HDLC SERPL-MCNC: 54 MG/DL
LDLC SERPL CALC-MCNC: 125 MG/DL
NONHDLC SERPL-MCNC: 151 MG/DL
POTASSIUM BLD-SCNC: 4 MMOL/L (ref 3.4–5.3)
PROT SERPL-MCNC: 6.9 G/DL (ref 6.8–8.8)
SODIUM SERPL-SCNC: 139 MMOL/L (ref 133–144)
TRIGL SERPL-MCNC: 131 MG/DL
TSH SERPL DL<=0.005 MIU/L-ACNC: 0.84 MU/L (ref 0.4–4)

## 2021-10-04 ENCOUNTER — TRANSFERRED RECORDS (OUTPATIENT)
Dept: HEALTH INFORMATION MANAGEMENT | Facility: CLINIC | Age: 61
End: 2021-10-04

## 2021-10-11 ENCOUNTER — E-VISIT (OUTPATIENT)
Dept: INTERNAL MEDICINE | Facility: CLINIC | Age: 61
End: 2021-10-11
Payer: COMMERCIAL

## 2021-10-11 DIAGNOSIS — J01.90 ACUTE BACTERIAL SINUSITIS: Primary | ICD-10-CM

## 2021-10-11 DIAGNOSIS — B96.89 ACUTE BACTERIAL SINUSITIS: Primary | ICD-10-CM

## 2021-10-11 PROCEDURE — 99421 OL DIG E/M SVC 5-10 MIN: CPT | Performed by: PHYSICIAN ASSISTANT

## 2021-10-11 RX ORDER — AZITHROMYCIN 250 MG/1
TABLET, FILM COATED ORAL
Qty: 6 TABLET | Refills: 0 | Status: SHIPPED | OUTPATIENT
Start: 2021-10-11 | End: 2021-10-16

## 2021-10-11 NOTE — PATIENT INSTRUCTIONS
Dear Latesha Ruiz    After reviewing your responses, I've been able to diagnose you with?a sinus infection caused by bacteria.?     Based on your responses and diagnosis, I have prescribed zpak to treat your symptoms. I have sent this to your pharmacy.?     It is also important to stay well hydrated, get lots of rest and take over-the-counter decongestants,?tylenol?or ibuprofen if you?are able to?take those medications per your primary care provider to help relieve discomfort.?     It is important that you take?all of?your prescribed medication even if your symptoms are improving after a few doses.? Taking?all of?your medicine helps prevent the symptoms from returning.?     If your symptoms worsen, you develop severe headache, vomiting, high fever (>102), or are not improving in 7 days, please contact your primary care provider for an appointment or visit any of our convenient Walk-in Care or Urgent Care Centers to be seen which can be found on our website?here.?     Thanks again for choosing?us?as your health care partner,?   ?  Davey Slater PA-C?

## 2021-11-22 ENCOUNTER — MYC MEDICAL ADVICE (OUTPATIENT)
Dept: PEDIATRICS | Facility: CLINIC | Age: 61
End: 2021-11-22
Payer: COMMERCIAL

## 2021-11-22 ENCOUNTER — E-VISIT (OUTPATIENT)
Dept: PEDIATRICS | Facility: CLINIC | Age: 61
End: 2021-11-22
Payer: COMMERCIAL

## 2021-11-22 DIAGNOSIS — J01.01 ACUTE RECURRENT MAXILLARY SINUSITIS: Primary | ICD-10-CM

## 2021-11-22 PROCEDURE — 99421 OL DIG E/M SVC 5-10 MIN: CPT | Performed by: PEDIATRICS

## 2021-11-22 RX ORDER — DOXYCYCLINE 100 MG/1
100 CAPSULE ORAL 2 TIMES DAILY
Qty: 20 CAPSULE | Refills: 0 | Status: SHIPPED | OUTPATIENT
Start: 2021-11-22 | End: 2021-12-02

## 2021-11-22 RX ORDER — FLUTICASONE PROPIONATE 50 MCG
1 SPRAY, SUSPENSION (ML) NASAL DAILY
Qty: 16 G | Refills: 0 | Status: SHIPPED | OUTPATIENT
Start: 2021-11-22 | End: 2022-03-05

## 2021-11-22 NOTE — TELEPHONE ENCOUNTER
Called patient and scheduled her with Macy for a virtual appointment tomorrow, limited availability. Explained to patient that it would be better to come into the clinic in order for a physical exam to be complete and so that her lungs could be checked, but she states she doesn't want to come into the clinic before the holidays and potentially  something worse, she would just like a refill on her z-pack she has struggled with sinus issues in the past and believes this will help.     Scheduled, closing encounter.     Radha Cunha, CMA

## 2021-11-22 NOTE — TELEPHONE ENCOUNTER
Per Niharika's request, spoke with patient and requested patient to complete and E-visit directed to her PCP since patient does not want to come into clinic. Informed patient that this message string will be sent to PCP for review.    Linda Henderson on 11/22/2021 at 2:43 PM

## 2021-11-22 NOTE — TELEPHONE ENCOUNTER
MA/TC: Please call pt to make an appt with any provider for continued sinus problems    Thank you  Marcos Evans RN on 11/22/2021 at 1:35 PM

## 2022-02-25 ENCOUNTER — HOSPITAL ENCOUNTER (OUTPATIENT)
Dept: MAMMOGRAPHY | Facility: CLINIC | Age: 62
Discharge: HOME | End: 2022-02-25
Attending: PEDIATRICS | Admitting: PEDIATRICS
Payer: COMMERCIAL

## 2022-02-25 DIAGNOSIS — Z12.31 VISIT FOR SCREENING MAMMOGRAM: ICD-10-CM

## 2022-02-25 PROCEDURE — 77067 SCR MAMMO BI INCL CAD: CPT

## 2022-03-02 ENCOUNTER — NURSE TRIAGE (OUTPATIENT)
Dept: PEDIATRICS | Facility: CLINIC | Age: 62
End: 2022-03-02
Payer: COMMERCIAL

## 2022-03-02 NOTE — TELEPHONE ENCOUNTER
"S-(situation): Pt calling in regarding mild abdominal pain following eating with lower right mild abdominal pain.    B-(background): Pt states that, \"I have lactose intolerance, this is happened in the past and it last a few hours and goes away, I thought it was probably something I ate but it happened again this morning.\"     A-(assessment): Pt reports pain as a \"dull ache in lower right abdomen.\" Reports pain comes and goes, \"it is not painful but sort of annoying like I thought I just pulled a muscle.\" Pt denies fever, denies vomiting. Pt has reported x2 loose stools on 3/1/22 and x1 loose stool on 3/2/22 but states, \"it is not like diarrhea.\" Both times occurred after eating. Pt states, \"I don't think I ate anything with dairy in it. I just want to know if I should be seen in clinic or urgent care. I need to be seen today.\"     R-(recommendations): Reviewed advise under care tab. Attempted to schedule an appt with pt, no available appts today. Pt states, \"I have a special needs son that lives with us and I don't know if my  is available to watch him tomorrow, I know he is available this afternoon.\" Pt states she will go to UC if symptoms worsen and will call tomorrow if needing to schedule an appt. No further questions or concerns at this time.    Reason for Disposition    Abdominal pain is a chronic symptom (recurrent or ongoing AND lasting > 4 weeks)    Additional Information    Negative: Passed out (i.e., fainted, collapsed and was not responding)    Negative: Shock suspected (e.g., cold/pale/clammy skin, too weak to stand, low BP, rapid pulse)    Negative: Sounds like a life-threatening emergency to the triager    Negative: Chest pain    Negative: Pain is mainly in upper abdomen (if needed ask: 'is it mainly above the belly button?')    Negative: Abdominal pain and pregnant > 20 weeks    Negative: Abdominal pain and pregnant < 20 weeks    Negative: SEVERE abdominal pain (e.g., excruciating)    " Negative: Vomiting red blood or black (coffee ground) material    Negative: Bloody, black, or tarry bowel movements (Exception: chronic-unchanged black-grey bowel movements and is taking iron pills or Pepto-bismol)    Negative: Constant abdominal pain lasting > 2 hours    Negative: Vomiting bile (green color)    Negative: Patient sounds very sick or weak to the triager    Negative: Vomiting and abdomen looks much more swollen than usual    Negative: White of the eyes have turned yellow (i.e., jaundice)    Negative: Blood in urine (red, pink, or tea-colored)    Negative: Fever > 103 F (39.4 C)    Negative: Fever > 101 F (38.3 C) and over 60 years of age    Negative: Fever > 100.0 F (37.8 C) and has diabetes mellitus or a weak immune system (e.g., HIV positive, cancer chemotherapy, organ transplant, splenectomy, chronic steroids)    Negative: Fever > 100.0 F (37.8 C) and bedridden (e.g., nursing home patient, stroke, chronic illness, recovering from surgery)    Negative: Pregnant or could be pregnant (i.e., missed last menstrual period)    Negative: Unusual vaginal discharge    Negative: Age > 60 years    Negative: Patient wants to be seen    Negative: MODERATE OR MILD pain that comes and goes (cramps) lasts > 24 hours    Protocols used: ABDOMINAL PAIN - FEMALE-A-[place]    Angelica CLEMENTS RN

## 2022-03-05 ENCOUNTER — OFFICE VISIT (OUTPATIENT)
Dept: URGENT CARE | Facility: URGENT CARE | Age: 62
End: 2022-03-05
Payer: COMMERCIAL

## 2022-03-05 VITALS
BODY MASS INDEX: 22.89 KG/M2 | DIASTOLIC BLOOD PRESSURE: 64 MMHG | TEMPERATURE: 97.4 F | HEART RATE: 78 BPM | SYSTOLIC BLOOD PRESSURE: 102 MMHG | OXYGEN SATURATION: 99 % | RESPIRATION RATE: 16 BRPM | WEIGHT: 155 LBS

## 2022-03-05 DIAGNOSIS — B96.89 BV (BACTERIAL VAGINOSIS): Primary | ICD-10-CM

## 2022-03-05 DIAGNOSIS — N76.0 BV (BACTERIAL VAGINOSIS): Primary | ICD-10-CM

## 2022-03-05 DIAGNOSIS — R30.0 DYSURIA: ICD-10-CM

## 2022-03-05 LAB
ALBUMIN UR-MCNC: NEGATIVE MG/DL
AMORPH CRY #/AREA URNS HPF: ABNORMAL /HPF
APPEARANCE UR: CLEAR
BACTERIA #/AREA URNS HPF: ABNORMAL /HPF
BILIRUB UR QL STRIP: NEGATIVE
CLUE CELLS: PRESENT
COLOR UR AUTO: YELLOW
GLUCOSE UR STRIP-MCNC: NEGATIVE MG/DL
HGB UR QL STRIP: ABNORMAL
KETONES UR STRIP-MCNC: NEGATIVE MG/DL
LEUKOCYTE ESTERASE UR QL STRIP: NEGATIVE
NITRATE UR QL: NEGATIVE
PH UR STRIP: 7.5 [PH] (ref 5–7)
RBC #/AREA URNS AUTO: ABNORMAL /HPF
SP GR UR STRIP: 1.01 (ref 1–1.03)
SQUAMOUS #/AREA URNS AUTO: ABNORMAL /LPF
TRICHOMONAS, WET PREP: ABNORMAL
UROBILINOGEN UR STRIP-ACNC: 0.2 E.U./DL
WBC #/AREA URNS AUTO: ABNORMAL /HPF
WBC'S/HIGH POWER FIELD, WET PREP: ABNORMAL
YEAST, WET PREP: ABNORMAL

## 2022-03-05 PROCEDURE — 87210 SMEAR WET MOUNT SALINE/INK: CPT | Performed by: PHYSICIAN ASSISTANT

## 2022-03-05 PROCEDURE — 81001 URINALYSIS AUTO W/SCOPE: CPT | Performed by: PHYSICIAN ASSISTANT

## 2022-03-05 PROCEDURE — 99213 OFFICE O/P EST LOW 20 MIN: CPT | Performed by: PHYSICIAN ASSISTANT

## 2022-03-05 RX ORDER — METRONIDAZOLE 500 MG/1
500 TABLET ORAL 2 TIMES DAILY
Qty: 14 TABLET | Refills: 0 | Status: SHIPPED | OUTPATIENT
Start: 2022-03-05 | End: 2022-03-12

## 2022-03-05 NOTE — PROGRESS NOTES
SUBJECTIVE:   Latesha Ruiz is a 62 year old female who comes in for concerns of possible UTI  States that for the past 5 days with pressure over bladder but no actual UTI sx or blood in urine.  Does have slight vaginal odor but no real discharge and no concerns for STD  Does have long hx of mild on and off right sided abdominal/groin pain.  Started initially after activity and felt to be a strain in the muscle  Was seen and went to PT.  No hernia ever noted.  Pain seem to come and go over time and often worse after activity.  Did have C-sections and wondered if scar tissue.  Was seen by PCP years ago for same thing and normal exam and no cysts noted on ovary.  Again with the same type of mild pain.  Wants to make sure that not UTI or something else before talks  To PCP about imaging studies or next step.  Did also eat a [place] 5 day ago and had a few episodes on loose stools for 2 days but very mild and since resolved.    Past Medical History:   Diagnosis Date     AK (actinic keratosis) 6/17/2013     Allergic rhinitis, cause unspecified      Arthritis 2013?    mild in thumbs, jaw     Closed fracture of metacarpal bone(s), site unspecified 12/04    Right 5th metacarpal fx--caught in a door     Closed fracture of rib(s), unspecified ~1995     Contact dermatitis and other eczema, due to unspecified cause      Disorder of bone and cartilage, unspecified ~4/2000    T-score -2.00 in L-spine in 5/03     Lumbago      Premature menopause     Elevated FSH/LH in 1993--normal brain MRI 12/2001     No current outpatient medications on file.     Social History     Tobacco Use     Smoking status: Never Smoker     Smokeless tobacco: Never Used   Substance Use Topics     Alcohol use: No       ROS:   Review of systems negative except as stated above.    OBJECTIVE:  /64 (BP Location: Right arm, Patient Position: Sitting, Cuff Size: Adult Regular)   Pulse 78   Temp 97.4  F (36.3  C) (Tympanic)   Resp 16   Wt 70.3 kg  (155 lb)   SpO2 99%   BMI 22.89 kg/m    GENERAL APPEARANCE: healthy, alert and no distress  RESP: lungs clear to auscultation - no rales, rhonchi or wheezes  CV: regular rates and rhythm, normal S1 S2, no murmur noted  ABDOMEN:  soft, nontender, no HSM or masses and bowel sounds normal  BACK: No CVA tenderness  GU_female: deferred, self wet prep obtained   SKIN: no suspicious lesions or rashes    Results for orders placed or performed in visit on 03/05/22   UA macro with reflex to Microscopic and Culture - Clinc Collect     Status: Abnormal    Specimen: Urine, Clean Catch   Result Value Ref Range    Color Urine Yellow Colorless, Straw, Light Yellow, Yellow    Appearance Urine Clear Clear    Glucose Urine Negative Negative mg/dL    Bilirubin Urine Negative Negative    Ketones Urine Negative Negative mg/dL    Specific Gravity Urine 1.015 1.003 - 1.035    Blood Urine Trace (A) Negative    pH Urine 7.5 (H) 5.0 - 7.0    Protein Albumin Urine Negative Negative mg/dL    Urobilinogen Urine 0.2 0.2, 1.0 E.U./dL    Nitrite Urine Negative Negative    Leukocyte Esterase Urine Negative Negative   Urine Microscopic     Status: Abnormal   Result Value Ref Range    Bacteria Urine Moderate (A) None Seen /HPF    RBC Urine 0-2 0-2 /HPF /HPF    WBC Urine 0-5 0-5 /HPF /HPF    Squamous Epithelials Urine Few (A) None Seen /LPF    Amorphous Crystals Urine Few (A) None Seen /HPF    Narrative    Urine Culture not indicated   Wet prep - Clinic Collect     Status: Abnormal    Specimen: Vagina; Swab   Result Value Ref Range    Trichomonas Absent Absent    Yeast Absent Absent    Clue Cells Present (A) Absent    WBCs/high power field 2+ (A) None       assessment/plan:  (N76.0,  B96.89) BV (bacterial vaginosis)  (primary encounter diagnosis)  Comment:   Plan: metroNIDAZOLE (FLAGYL) 500 MG tablet          Med as directed and side affects discussed.  Nature of BV reviewed along with sx.  No sign of UTI noted. Advised to Follow-up with PCP as  needed if sx worsen or new sx develop    (R30.0) Dysuria  Comment:   Plan: UA macro with reflex to Microscopic and Culture        - Clinc Collect, Urine Microscopic, Wet prep -         Clinic Collect         As above

## 2022-04-01 ENCOUNTER — ANCILLARY PROCEDURE (OUTPATIENT)
Dept: BONE DENSITY | Facility: CLINIC | Age: 62
End: 2022-04-01
Attending: PEDIATRICS
Payer: COMMERCIAL

## 2022-04-01 DIAGNOSIS — Z78.0 ASYMPTOMATIC POSTMENOPAUSAL STATUS: ICD-10-CM

## 2022-04-01 DIAGNOSIS — M81.0 OSTEOPOROSIS, UNSPECIFIED OSTEOPOROSIS TYPE, UNSPECIFIED PATHOLOGICAL FRACTURE PRESENCE: ICD-10-CM

## 2022-04-01 PROCEDURE — 77080 DXA BONE DENSITY AXIAL: CPT | Performed by: INTERNAL MEDICINE

## 2022-04-15 ENCOUNTER — MYC MEDICAL ADVICE (OUTPATIENT)
Dept: PEDIATRICS | Facility: CLINIC | Age: 62
End: 2022-04-15
Payer: COMMERCIAL

## 2022-04-15 NOTE — TELEPHONE ENCOUNTER
Please see patient MyChart message as update. Patient does have upcoming appointment w/ PCP 4/20/22.   Parmjit SANCHEZ RN

## 2022-04-20 ENCOUNTER — VIRTUAL VISIT (OUTPATIENT)
Dept: PEDIATRICS | Facility: CLINIC | Age: 62
End: 2022-04-20
Payer: COMMERCIAL

## 2022-04-20 DIAGNOSIS — R10.31 RLQ ABDOMINAL PAIN: Primary | ICD-10-CM

## 2022-04-20 DIAGNOSIS — Z12.11 COLON CANCER SCREENING: ICD-10-CM

## 2022-04-20 DIAGNOSIS — M81.0 OSTEOPOROSIS, UNSPECIFIED OSTEOPOROSIS TYPE, UNSPECIFIED PATHOLOGICAL FRACTURE PRESENCE: ICD-10-CM

## 2022-04-20 PROCEDURE — 99214 OFFICE O/P EST MOD 30 MIN: CPT | Mod: 95 | Performed by: PEDIATRICS

## 2022-04-20 NOTE — PROGRESS NOTES
Latesha is a 62 year old who is being evaluated via a billable telephone visit.      What phone number would you like to be contacted at? Cell #  How would you like to obtain your AVS? MyChart    Assessment & Plan       ICD-10-CM    1. RLQ abdominal pain  R10.31 US Pelvic Complete with Transvaginal    Vaginal symptoms improved with BV treatment - discussed considering rephresh or replens.  Given chronicity of right sided pain, also discussed pelvic US     2. Osteoporosis, unspecified osteoporosis type, unspecified pathological fracture presence  M81.0 Adult Endocrinology  Referral    Has poorly tolerated oral therapies - referred to endocrinology to discuss other treatment options     3. Colon cancer screening  Z12.11 Adult Gastro Ref - Procedure Only           Return in about 4 weeks (around 5/18/2022), or if symptoms worsen or fail to improve.    Maday Anaya MD  [facility] Charlton is a 62 year old who presents for the following health issues       History of Present Illness       Reason for visit:  Occasional discomfort in lower right abdomen.  Symptom onset:  More than a month  Symptoms include:  Dull ache at times  Symptom intensity:  Mild  Had these symptoms before:  Yes  Has tried/received treatment for these symptoms:  NoShe consumes 0 sweetened beverage(s) daily.She exercises with enough effort to increase her heart rate 10 to 19 minutes per day.  She exercises with enough effort to increase her heart rate 4 days per week.   She is taking medications regularly.       COVID last week - entire family got it.  Recovering ok.   Tested positive last Friday, but might have had symptoms even the Tuesday/Wednesday before.  Symptoms have been largely in sinuses.  Mild cough - now better.  Gargling with salt water has been helpful.  Slightly fatigued.      Abdominal symptoms - recent UC visit 3/5 with BV and treated with metronidazole.      Annoying, pinch of pain  lower right hip bone to right lower quadrant.  Intermittent pain there that has been present on and off for a few years.  Pain was worse with BV.  Sensitive area in the right lower quadrant has been persistent.  Stooling sometimes helps with sometimes.      Infection symptoms improved with antibiotics for BV.       Planning colonoscopy later this year for 5 year plan due to polyp.  No new GI symptoms.      Osteoporosis - very bad experience with oral bisphosphonates - severe cramping and diarrhea.    Lactose intolerant, but gets calcium other ways.        Objective           Vitals:  No vitals were obtained today due to virtual visit.    Physical Exam   alert and no distress  PSYCH: Alert and oriented times 3; coherent speech, normal   rate and volume, able to articulate logical thoughts, able   to abstract reason, no tangential thoughts, no hallucinations   or delusions  Her affect is normal  RESP: No cough, no audible wheezing, able to talk in full sentences  Remainder of exam unable to be completed due to telephone visits      Maday Anaya MD          Phone call duration: 35 minutes

## 2022-05-05 ENCOUNTER — ANCILLARY PROCEDURE (OUTPATIENT)
Dept: ULTRASOUND IMAGING | Facility: CLINIC | Age: 62
End: 2022-05-05
Attending: PEDIATRICS
Payer: COMMERCIAL

## 2022-05-05 DIAGNOSIS — R10.31 RLQ ABDOMINAL PAIN: ICD-10-CM

## 2022-05-05 PROCEDURE — 76856 US EXAM PELVIC COMPLETE: CPT | Performed by: OBSTETRICS & GYNECOLOGY

## 2022-05-05 PROCEDURE — 76830 TRANSVAGINAL US NON-OB: CPT | Performed by: OBSTETRICS & GYNECOLOGY

## 2022-07-05 ENCOUNTER — OFFICE VISIT (OUTPATIENT)
Dept: DERMATOLOGY | Facility: CLINIC | Age: 62
End: 2022-07-05
Payer: COMMERCIAL

## 2022-07-05 DIAGNOSIS — L72.0 MILIA: ICD-10-CM

## 2022-07-05 DIAGNOSIS — L73.8 SENILE SEBACEOUS GLAND HYPERPLASIA: ICD-10-CM

## 2022-07-05 DIAGNOSIS — D22.9 MULTIPLE NEVI: ICD-10-CM

## 2022-07-05 DIAGNOSIS — B07.9 VERRUCA VULGARIS: Primary | ICD-10-CM

## 2022-07-05 DIAGNOSIS — L82.1 SEBORRHEIC KERATOSIS: ICD-10-CM

## 2022-07-05 PROCEDURE — 17110 DESTRUCTION B9 LES UP TO 14: CPT | Performed by: DERMATOLOGY

## 2022-07-05 PROCEDURE — 99203 OFFICE O/P NEW LOW 30 MIN: CPT | Mod: 25 | Performed by: DERMATOLOGY

## 2022-07-05 NOTE — LETTER
7/5/2022      RE: Latesha Ruiz  [address]  [place] [facility] [address]     Dear Colleague,    Thank you for the opportunity to participate in the care of your patient, Latesha Ruiz, at the [facility] at [facility]. Please see a copy of my visit note below.    Dermatology Clinic Note    Dermatology Problem List:  1. Actinic keratoses  2. Verruca   3. Benign pigmented nevi   4. Melasma  5. Seborrheic keratoses  6. Lipoma L forearm        Assessment and Plan:  1. Benign pigmented nevi: No lesions of concern. Sun protection recommended. Discussed ABCDEs of malignant melanoma.      2. Seborrheic keratoses: Benign hyperkeratotic papules and plaques. No treatment advised unless irritation occurs.    3. Lipoma: Not bothersome to the patient.     4. Verruca on the L 3rd finger treated with 2 10 sec freeze cycles with liquid nitrogen. Wound info discussed.     5. Melasma: Sun protection.     6. Sebaceous hyperplasia and milia. Benign skin papules on the face.     RTC 1 year, sooner prn.       Thank you for involving me in this patient's care.     Maday Way MD   of Dermatology  [facility]    CC: Referred MD Suresh  No address on file    Maday Anaya MD    ____________________________________________________________________________________________________________________________________________    CC: Patient presents with:  New Patient: Np/skin check    HPI: Latesha Ruiz is a 62 year old female presenting for initial evaluation of skin growths seen at the request of Maday Anaya. No history of skin cancer. Had seen Amy Jones previously and Dermatology Consultant. She has history of liquid nitrogen to aks previously.       Patient Active Problem List   Diagnosis     Allergic rhinitis     Osteoporosis     CARDIOVASCULAR SCREENING; LDL GOAL LESS THAN 160     Lactose intolerance     Stress  incontinence     Sebaceous hyperplasia of face     AK (actinic keratosis)     Vitamin D deficiency       Allergies   Allergen Reactions     Asa [Aspirin]      Codeine Nausea and Vomiting     Erythromycin Nausea     Penicillins Hives         No current outpatient medications on file.     No current facility-administered medications for this visit.       Family History   Problem Relation Age of Onset     Psychotic Disorder Mother         anxiety     Thyroid Disease Mother         Born . Has CAD, two stents placed.      C.A.D. Mother         Had MI/stents x 3 at age 84.     Cancer Mother         BCC of face     Coronary Artery Disease Mother         at age 84 - stents     Other Cancer Mother         lung cancer - age 92 non-smoker     Family History Negative Father         Born . Resides in SNF. Has spinal stenosis, difficulty ambulating, also short term memory loss.      Prostate Cancer Father         age 65 or so     Family History Negative Sister         Brief period of anxiety, born      Thyroid Disease Sister         Non cancerous growth on thyroid removed     Thyroid Disease Sister      Psychotic Disorder Brother         anxiety/Born 194     Depression Brother      Breast Cancer Maternal Grandmother      Family History Negative Maternal Grandfather          age 74, pulmonary embolism. Had colitis     Cancer Paternal Grandfather         ?stomach CA (was a digestive organ)     Gastrointestinal Disease Son         gastroesophageal reflux     Neurologic Disorder Son         seizure disorder     Colon Polyps Son      Respiratory Child         mild asthma/mild asthma     Breast Cancer Other        Social History     Tobacco Use     Smoking status: Never Smoker     Smokeless tobacco: Never Used   Substance Use Topics     Alcohol use: No     Drug use: No         ROS: Patient in normal state of health and is feeling well on complete ROS.     EXAM:  There were no vitals taken for this visit.  GEN: Alert,  no distress  NEURO: A/O x3  SKIN: Full body excluding genitals  --Tan and gray 3-5 mm waxy papules on the upper chest, extensor upper arms, back, thighs, shins  --Cherry red papules on the chest, abdomen, back  --Few medium brown macules on the back, chest  --Hypopigmented macules on the forearms  --Light brown patches on the lateral forehead and lateral cheeks  --White 1 mm papules on the mid cheeks  --Yellow pink papules on the forehead  --verrucous papule 2 mm on the L 3rd palmar finger  --subcutaneous soft nodule on the L dorsal forearm    Please do not hesitate to contact me if you have any questions/concerns.     Sincerely,       Maday Way MD

## 2022-07-05 NOTE — PROGRESS NOTES
Dermatology Clinic Note    Dermatology Problem List:  1. Actinic keratoses  2. Verruca   3. Benign pigmented nevi   4. Melasma  5. Seborrheic keratoses  6. Lipoma L forearm        Assessment and Plan:  1. Benign pigmented nevi: No lesions of concern. Sun protection recommended. Discussed ABCDEs of malignant melanoma.      2. Seborrheic keratoses: Benign hyperkeratotic papules and plaques. No treatment advised unless irritation occurs.    3. Lipoma: Not bothersome to the patient.     4. Verruca on the L 3rd finger treated with 2 10 sec freeze cycles with liquid nitrogen. Wound info discussed.     5. Melasma: Sun protection.     6. Sebaceous hyperplasia and milia. Benign skin papules on the face.     RTC 1 year, sooner prn.       Thank you for involving me in this patient's care.     Maday Way MD   of Dermatology  [facility]    CC: Referred MD Suresh  No address on file    Maday Anaya MD    ____________________________________________________________________________________________________________________________________________    CC: Patient presents with:  New Patient: Np/skin check    HPI: Latesha Ruiz is a 62 year old female presenting for initial evaluation of skin growths seen at the request of Maday Anaya. No history of skin cancer. Had seen Amy Jones previously and Dermatology Consultant. She has history of liquid nitrogen to aks previously.       Patient Active Problem List   Diagnosis     Allergic rhinitis     Osteoporosis     CARDIOVASCULAR SCREENING; LDL GOAL LESS THAN 160     Lactose intolerance     Stress incontinence     Sebaceous hyperplasia of face     AK (actinic keratosis)     Vitamin D deficiency       Allergies   Allergen Reactions     Asa [Aspirin]      Codeine Nausea and Vomiting     Erythromycin Nausea     Penicillins Hives         No current outpatient medications on file.     No current facility-administered medications for this  visit.       Family History   Problem Relation Age of Onset     Psychotic Disorder Mother         anxiety     Thyroid Disease Mother         Born [birth year]. Has CAD, two stents placed.      C.A.D. Mother         Had MI/stents x 3 at age 84.     Cancer Mother         BCC of face     Coronary Artery Disease Mother         at age 84 - stents     Other Cancer Mother         lung cancer - age 92 non-smoker     Family History Negative Father         Born . Resides in SNF. Has spinal stenosis, difficulty ambulating, also short term memory loss.      Prostate Cancer Father         age 65 or so     Family History Negative Sister         Brief period of anxiety, born      Thyroid Disease Sister         Non cancerous growth on thyroid removed     Thyroid Disease Sister      Psychotic Disorder Brother         anxiety/Born      Depression Brother      Breast Cancer Maternal Grandmother      Family History Negative Maternal Grandfather          age 74, pulmonary embolism. Had colitis     Cancer Paternal Grandfather         ?stomach CA (was a digestive organ)     Gastrointestinal Disease Son         gastroesophageal reflux     Neurologic Disorder Son         seizure disorder     Colon Polyps Son      Respiratory Child         mild asthma/mild asthma     Breast Cancer Other        Social History     Tobacco Use     Smoking status: Never Smoker     Smokeless tobacco: Never Used   Substance Use Topics     Alcohol use: No     Drug use: No         ROS: Patient in normal state of health and is feeling well on complete ROS.     EXAM:  There were no vitals taken for this visit.  GEN: Alert, no distress  NEURO: A/O x3  SKIN: Full body excluding genitals  --Tan and gray 3-5 mm waxy papules on the upper chest, extensor upper arms, back, thighs, shins  --Cherry red papules on the chest, abdomen, back  --Few medium brown macules on the back, chest  --Hypopigmented macules on the forearms  --Light brown patches on the lateral  forehead and lateral cheeks  --White 1 mm papules on the mid cheeks  --Yellow pink papules on the forehead  --verrucous papule 2 mm on the L 3rd palmar finger  --subcutaneous soft nodule on the L dorsal forearm

## 2022-08-05 ENCOUNTER — MYC MEDICAL ADVICE (OUTPATIENT)
Dept: PEDIATRICS | Facility: CLINIC | Age: 62
End: 2022-08-05

## 2022-08-05 DIAGNOSIS — Z12.11 COLON CANCER SCREENING: Primary | ICD-10-CM

## 2022-08-05 DIAGNOSIS — R13.19 ESOPHAGEAL DYSPHAGIA: ICD-10-CM

## 2022-08-05 NOTE — TELEPHONE ENCOUNTER
Dr. Anaya,    Please see [facility] message    What are your thoughts on an endoscopy?  Referral was placed for [facility], is there someone specific you would recommend?    Immunizations are updated    Thank you  Marcos Evans RN on 8/5/2022 at 11:44 AM

## 2022-08-08 ASSESSMENT — ENCOUNTER SYMPTOMS
ABDOMINAL PAIN: 0
EYE IRRITATION: 1
BACK PAIN: 0
BLOATING: 0
HEARTBURN: 1
EYE PAIN: 0
STIFFNESS: 0
JOINT SWELLING: 0
NECK PAIN: 0
MUSCLE WEAKNESS: 0
MYALGIAS: 1
MUSCLE CRAMPS: 0
DOUBLE VISION: 0
BLOOD IN STOOL: 0
NAUSEA: 0
EYE REDNESS: 0
RECTAL PAIN: 0
ARTHRALGIAS: 0
JAUNDICE: 0
EYE WATERING: 0
DIARRHEA: 0
CONSTIPATION: 0
BOWEL INCONTINENCE: 0
VOMITING: 0

## 2022-08-15 ENCOUNTER — VIRTUAL VISIT (OUTPATIENT)
Dept: ENDOCRINOLOGY | Facility: CLINIC | Age: 62
End: 2022-08-15
Attending: PEDIATRICS
Payer: COMMERCIAL

## 2022-08-15 DIAGNOSIS — M81.0 OSTEOPOROSIS, UNSPECIFIED OSTEOPOROSIS TYPE, UNSPECIFIED PATHOLOGICAL FRACTURE PRESENCE: ICD-10-CM

## 2022-08-15 PROCEDURE — 99204 OFFICE O/P NEW MOD 45 MIN: CPT | Mod: 95 | Performed by: INTERNAL MEDICINE

## 2022-08-15 RX ORDER — ALENDRONATE SODIUM 70 MG/1
70 TABLET ORAL
Qty: 12 TABLET | Refills: 3 | Status: SHIPPED | OUTPATIENT
Start: 2022-08-15 | End: 2022-10-13

## 2022-08-15 NOTE — PROGRESS NOTES
Latesha Ruiz is being evaluated via a billable video visit.        How would you like to obtain your AVS? MyChart  For the video visit, send the invitation by: Send to e-mail at: [email or web address]  Will anyone else be joining your video visit? No

## 2022-08-15 NOTE — PROGRESS NOTES
"THIS IS A VIDEO VISIT:    Phone call visit/virtual visit encounter:    Name of patient: Latesha Ruiz    Date of encounter: 8/15/2022    Time of start of video visit: 11:30    Video started: 11:37    Video ended: 12:04    Provider location: working from [place]/ [facility]    Patient location: patients home.    Mode of transmission: video/ Doximity    Verbal consent: obtained before starting visit. Pt is agreeable.      The patient has been notified of following:      \"This VIDEO visit will be conducted via a call between you and your physician/provider. We have found that certain health care needs can be provided without the need for a physical exam.  This service lets us provide the care you need with a short phone conversation.  If a prescription is necessary we can send it directly to your pharmacy.  If lab work is needed we can place an order for that and you can then stop by our lab to have the test done at a later time.     With new updates with corona virus patient might be billed as clinic visit.     If during the course of the call the physician/provider feels a telephone visit is not appropriate, you will not be charged for this service.\"      Past medical history, social history, family history, allergy and medications were reviewed and updated as appropriate.  Reviewed pertinent labs, notes, imaging studies personally.    Name: Latesha Ruiz  Date: 8/15/2022  Seen in consultation with Maday Anaya for osteoporosis.     HPI:  Latesha Ruiz is a 62 year old female who presents for the evaluation of osteoporosis.   has a past medical history of AK (actinic keratosis) (6/17/2013), Allergic rhinitis, cause unspecified, Arthritis (2013?), Closed fracture of metacarpal bone(s), site unspecified (12/04), Closed fracture of rib(s), unspecified (~1995), Contact dermatitis and other eczema, due to unspecified cause, Disorder of bone and cartilage, unspecified (~4/2000), Lumbago, and Premature " menopause.    H/o Ostepenia in her 40s.    DEXA 4/2022: Osteoporosis. There has been significant decrease in bone density of the lumbar spine. There has been no significant change in bone density of the hip(s).     GERD: No    Dental: ok    Kidney function: within normal limits    RISK FACTORS:  Post-menopausal, Fractures of ribs, hand, follow up osteoporosis.    CURRENT TREATMENT:  Calcium with Vitamin D, previous Fosamax, stopped approximately 2008.    In 2000s- she was told to takes calcium and vit D medication.  2003- Actonel was started but not sure how long she took it  Actonel [id number] 2005- Evista was recommended. But not sure if she took it.  Fosamax [id number]    Not on medication since 2010.  ( medication were on hold as she was having some GI issues)    Then in 2010- she had extensive GI work up was done.  She was diagnosed with lactose intolerance.  Has upcoming endoscopy and colonoscopy.    Smoke:No  Family History:No  Menstrual history/Birthing: early menopause in 30s. Was on HRT for brief period of time.  Fractures:Yes: h.o rib fracture after a fall in 1996.  Kidney stones: No  GI Surgery:No  Duration of therapy:  As noted above.  Exercise: walking few times a week. Also plays tennis and golf. Used to be a .  Diet: is some lactose intolerant.   Ca/Vitamin D: takes sporadically.   Alcohol:  No  Eating Disorder: No  Steroid Use:  No  PMH/PSH:  Past Medical History:   Diagnosis Date     AK (actinic keratosis) 6/17/2013     Allergic rhinitis, cause unspecified      Arthritis 2013?    mild in thumbs, jaw     Closed fracture of metacarpal bone(s), site unspecified 12/04    Right 5th metacarpal fx--caught in a door     Closed fracture of rib(s), unspecified ~1995     Contact dermatitis and other eczema, due to unspecified cause      Disorder of bone and cartilage, unspecified ~4/2000    T-score -2.00 in L-spine in 5/03     Lumbago      Premature menopause     Elevated FSH/LH in 1993--normal  brain MRI 2001     Past Surgical History:   Procedure Laterality Date     ABDOMEN SURGERY  , ,     3 C-sections     BIOPSY  1983, ?    breast lumps - benign     BREAST SURGERY  ,     benign lumps     COLONOSCOPY  ,      ZZC APPENDECTOMY  1971     ZZC  DELIVERY ONLY      Primary C/S - FTP     ZZC  DELIVERY ONLY      Repeat C/S     ZZC  DELIVERY ONLY      Repeat C/S, TL     ZZC NONSPECIFIC PROCEDURE  1989    Breast Bx - Right     Family Hx:  Family History   Problem Relation Age of Onset     Psychotic Disorder Mother         anxiety     Thyroid Disease Mother         Born 192. Has CAD, two stents placed.      C.A.D. Mother         Had MI/stents x 3 at age 84.     Cancer Mother         BCC of face     Coronary Artery Disease Mother         at age 84 - stents     Other Cancer Mother         lung cancer - age 92 non-smoker     Family History Negative Father         Born . Resides in SNF. Has spinal stenosis, difficulty ambulating, also short term memory loss.      Prostate Cancer Father         age 65 or so     Family History Negative Sister         Brief period of anxiety, born      Thyroid Disease Sister         Non cancerous growth on thyroid removed     Thyroid Disease Sister      Psychotic Disorder Brother         anxiety/Born 1949     Depression Brother      Breast Cancer Maternal Grandmother      Family History Negative Maternal Grandfather          age 74, pulmonary embolism. Had colitis     Cancer Paternal Grandfather         ?stomach CA (was a digestive organ)     Gastrointestinal Disease Son         gastroesophageal reflux     Neurologic Disorder Son         seizure disorder     Colon Polyps Son      Respiratory Child         mild asthma/mild asthma     Breast Cancer Other              Social Hx:  Social History     Socioeconomic History     Marital status:      Spouse name: Ramu     Number of children: 2     Years of  education: Not on file     Highest education level: Bachelor's degree (e.g., BA, AB, BS)   Occupational History     Occupation: Teacher     Employer: NONE      Comment: Part-time    Tobacco Use     Smoking status: Never Smoker     Smokeless tobacco: Never Used   Substance and Sexual Activity     Alcohol use: No     Drug use: No     Sexual activity: Yes     Partners: Male     Birth control/protection: Post-menopausal     Comment: 1 partner -    Other Topics Concern      Service Not Asked     Blood Transfusions Not Asked     Caffeine Concern Not Asked     Occupational Exposure Not Asked     Hobby Hazards Not Asked     Sleep Concern Not Asked     Stress Concern Not Asked     Weight Concern Not Asked     Special Diet Not Asked     Back Care Not Asked     Exercise Not Asked     Bike Helmet Not Asked     Seat Belt Not Asked     Self-Exams Not Asked     Parent/sibling w/ CABG, MI or angioplasty before 65F 55M? No   Social History Narrative    Has 3 sons--middle son mentally handicapped, has seizure disorder and behavioral concerns.        Works as  for [employer], previously         Originally from [place]         is a      Social Determinants of Health     Financial Resource Strain: Not on file   Food Insecurity: Not on file   Transportation Needs: Not on file   Physical Activity: Not on file   Stress: Not on file   Social Connections: Not on file   Intimate Partner Violence: Not on file   Housing Stability: Not on file          MEDICATIONS:  currently has no medications in their medication list.    ROS     ROS: 10 point ROS neg other than the symptoms noted above in the HPI.    Physical Exam   VS: There were no vitals taken for this visit.  GENERAL: healthy, alert and no distress  EYES: Eyes grossly normal to inspection, conjunctivae and sclerae normal  ENT: no nose swelling, nasal discharge.  Thyroid: no apparent thyroid nodules  RESP: no  audible wheeze, cough, or visible cyanosis.  No visible retractions or increased work of breathing.  Able to speak fully in complete sentences.  ABDO: not evaluated.  EXTREMITIES: no hand tremors.  NEURO: Cranial nerves grossly intact, mentation intact and speech normal  SKIN: No apparent skin lesions, rash or edema seen   PSYCH: mentation appears normal, affect normal/bright, judgement and insight intact, normal speech and appearance well-groomed    LABS:  BMP:  Last Basic Metabolic Panel:  Lab Results   Component Value Date     09/21/2021     08/28/2020      Lab Results   Component Value Date    POTASSIUM 4.0 09/21/2021    POTASSIUM 4.0 08/28/2020     Lab Results   Component Value Date    CHLORIDE 109 09/21/2021    CHLORIDE 110 08/28/2020     Lab Results   Component Value Date    MONE 7.9 09/21/2021    MONE 8.8 08/28/2020     Lab Results   Component Value Date    CO2 29 09/21/2021    CO2 26 08/28/2020     Lab Results   Component Value Date    BUN 11 09/21/2021    BUN 12 08/28/2020     Lab Results   Component Value Date    CR 0.87 09/21/2021    CR 0.74 08/28/2020     Lab Results   Component Value Date    GLC 86 09/21/2021    GLC 91 08/28/2020       TFTs:  Lab Results   Component Value Date    TSH 0.84 09/21/2021    TSH 0.69 11/03/2014       LFTs:  Liver Function Studies -   Recent Labs   Lab Test 09/21/21  1050   PROTTOTAL 6.9   ALBUMIN 3.8   BILITOTAL 0.6   ALKPHOS 52   AST 18   ALT 38       PTH:    Vitamin D:  Vitamin D Deficiency Screening Results:  Lab Results   Component Value Date    VITDT 23 09/21/2021    VITDT 25 08/28/2020    VITDT 20 09/27/2019    VITDT 29 09/21/2018    VITDT 19 (L) 06/27/2018       BoneTurnOverMarkers:    DEXA:  RISK FACTORS:  Post-menopausal, Fractures of ribs, hand, follow up osteoporosis     CURRENT TREATMENT:  Calcium with Vitamin D, previous Fosamax, stopped approximately 2008     FINDINGS:               Lumbar Spine (L1-L4)      T-score:  -3.0, degenerative changes  present               Left Femoral Neck            T-score:  -1.8               Right Femoral Neck          T-score:  -2.0                   Lumbar (L1-L4) BMD: 0.818  Previous: 0.875                          Total Hip Mean BMD: 0.845  Previous: 0.859     Comparison is made to another DXA performed on the same [employer]  machine on 07/26/2018.        IMPRESSION  Osteoporosis., Degenerative changes of the lumbar spine which may falsely elevate results.       All pertinent notes, labs, and images personally reviewed by me.     A/P  Ms.Louise LATA Ruiz is a 62 year old here for the evaluation of:    #1 Osteoporosis:  H/o Ostepenia in her 40s.  DEXA 4/2022: Osteoporosis. There has been significant decrease in bone density of the lumbar spine. There has been no significant change in bone density of the hip(s).   Her risk factors include Post-menopausal, Fractures of ribs, hand, follow up osteoporosis.  She had been on Actonel and Fosamax in past.  Not on medication since 2010.  Plan:  Discussed diagnosis, pathophysiology, management and treatment options of condition with pt.  Start FOSAMAX 70 mg/week.  DEXA in 1-2 years. ( Based on insurance)  Follow up in 1 year.    The pt was advised to    Maintain an adequate calcium and vitamin D intake and to supplement vitamin D if needed to maintain serum levels of 25 hydroxy D (25 OH D) between 30-60 ng/ml.    Limit alcohol intake to no more than 2 servings per day.    Limit caffeine intake.    Maintain an active lifestyle including weight-bearing exercises for at least 30 mins daily.    Take measures to reduce the risk of falling.  Discussed indications, risks and benefits of all medications prescribed, and answered questions to patient's satisfaction.      Instructions on Fosamax use and side effects - particularly esophageal adverse events - are carefully reviewed with her. This drug must be taken upon arising for the day on an empty stomach, with a large 6-8 ounce glass  of water; she must remain NPO in the upright position for at least 30 minutes afterwards and until after the first food of the day. If esophageal irritation is noted, she will stop the drug and call my office.  Treatment with bisphosphonate therapy will decrease fracture risk 50-70%.   There is risk of osteonecrosis of the jaw in patients using bisphosphonates is approximately 1/1700-1/100,000, with development most likely related to invasive dental procedures. If an invasive dental procedure was necessary, preferably stop the bisphosphonate approximately 3 months prior to reduce the risk. Let your dentist know that you are on this medication.  The data available at this time suggests that there is probably a small increase risk of atypical (nontraumatic) subtrochanteric fractures of the femur in patients on bisphosphonate therapy compared to those not on it. One large study suggested that for every 100 fractures prevented with bisphosphonate therapy, less than one femur fracture will occur. Other studies suggest one episode per 2,500 patient years. Patient should call with leg pain.            More than 50% of the time spent with Ms. Ruiz on counseling / coordinating her care.   All questions were answered.  The patient indicates understanding of the above issues and agrees with the plan set forth.    Follow-up:  1 year.    Chelsea Kenny MD  Endocrinology  [facility]/[place] [place]  CC: Maday Anaya    Answers for HPI/ROS submitted by the patient on 8/8/2022  General Symptoms: No  Skin Symptoms: No  HENT Symptoms: No  EYE SYMPTOMS: Yes  HEART SYMPTOMS: No  LUNG SYMPTOMS: No  INTESTINAL SYMPTOMS: Yes  URINARY SYMPTOMS: No  GYNECOLOGIC SYMPTOMS: No  BREAST SYMPTOMS: No  SKELETAL SYMPTOMS: Yes  BLOOD SYMPTOMS: No  NERVOUS SYSTEM SYMPTOMS: No  MENTAL HEALTH SYMPTOMS: No  Eye pain: No  Vision loss: No  Dry eyes: Yes  Watery eyes: No  Eye bulging: No  Double vision: No  Flashing of lights: No  Spots:  No  Floaters: No  Redness: No  Crossed eyes: No  Tunnel Vision: No  Yellowing of eyes: No  Eye irritation: Yes  Heart burn or indigestion: Yes  Nausea: No  Vomiting: No  Abdominal pain: No  Bloating: No  Constipation: No  Diarrhea: No  Blood in stool: No  Black stools: No  Rectal or Anal pain: No  Fecal incontinence: No  Yellowing of skin or eyes: No  Vomit with blood: No  Change in stools: No  Back pain: No  Muscle aches: Yes  Neck pain: No  Swollen joints: No  Joint pain: No  Bone pain: No  Muscle cramps: No  Muscle weakness: No  Joint stiffness: No  Bone fracture: No

## 2022-08-15 NOTE — LETTER
"    8/15/2022         RE: Latesha Ruiz  [address]  [facility] [address]        Dear Colleague,    Thank you for referring your patient, Latesha Ruiz, to the [facility]. Please see a copy of my visit note below.    Latesha Ruiz is being evaluated via a billable video visit.        How would you like to obtain your AVS? MyChart  For the video visit, send the invitation by: Send to e-mail at: [email or web address]  Will anyone else be joining your video visit? No      THIS IS A VIDEO VISIT:    Phone call visit/virtual visit encounter:    Name of patient: Latesha Ruiz    Date of encounter: 8/15/2022    Time of start of video visit: 11:30    Video started: 11:37    Video ended: 12:04    Provider location: working from [place]/ [facility]    Patient location: patients home.    Mode of transmission: video/ Doximity    Verbal consent: obtained before starting visit. Pt is agreeable.      The patient has been notified of following:      \"This VIDEO visit will be conducted via a call between you and your physician/provider. We have found that certain health care needs can be provided without the need for a physical exam.  This service lets us provide the care you need with a short phone conversation.  If a prescription is necessary we can send it directly to your pharmacy.  If lab work is needed we can place an order for that and you can then stop by our lab to have the test done at a later time.     With new updates with corona virus patient might be billed as clinic visit.     If during the course of the call the physician/provider feels a telephone visit is not appropriate, you will not be charged for this service.\"      Past medical history, social history, family history, allergy and medications were reviewed and updated as appropriate.  Reviewed pertinent labs, notes, imaging studies personally.    Name: Latesha Ruiz  Date: 8/15/2022  Seen in " consultation with Maday Anaya for osteoporosis.     HPI:  Latesha Ruiz is a 62 year old female who presents for the evaluation of osteoporosis.   has a past medical history of AK (actinic keratosis) (6/17/2013), Allergic rhinitis, cause unspecified, Arthritis (2013?), Closed fracture of metacarpal bone(s), site unspecified (12/04), Closed fracture of rib(s), unspecified (~1995), Contact dermatitis and other eczema, due to unspecified cause, Disorder of bone and cartilage, unspecified (~4/2000), Lumbago, and Premature menopause.    H/o Ostepenia in her 40s.    DEXA 4/2022: Osteoporosis. There has been significant decrease in bone density of the lumbar spine. There has been no significant change in bone density of the hip(s).     GERD: No    Dental: ok    Kidney function: within normal limits    RISK FACTORS:  Post-menopausal, Fractures of ribs, hand, follow up osteoporosis.    CURRENT TREATMENT:  Calcium with Vitamin D, previous Fosamax, stopped approximately 2008.    In 2000s- she was told to takes calcium and vit D medication.  2003- Actonel was started but not sure how long she took it  Actonel [id number] 2005- Evista was recommended. But not sure if she took it.  Fosamax 3147-6043    Not on medication since 2010.  ( medication were on hold as she was having some GI issues)    Then in 2010- she had extensive GI work up was done.  She was diagnosed with lactose intolerance.  Has upcoming endoscopy and colonoscopy.    Smoke:No  Family History:No  Menstrual history/Birthing: early menopause in 30s. Was on HRT for brief period of time.  Fractures:Yes: h.o rib fracture after a fall in 1996.  Kidney stones: No  GI Surgery:No  Duration of therapy:  As noted above.  Exercise: walking few times a week. Also plays tennis and golf. Used to be a .  Diet: is some lactose intolerant.   Ca/Vitamin D: takes sporadically.   Alcohol:  No  Eating Disorder: No  Steroid Use:  No  PMH/PSH:  Past Medical  History:   Diagnosis Date     AK (actinic keratosis) 2013     Allergic rhinitis, cause unspecified      Arthritis ?    mild in thumbs, jaw     Closed fracture of metacarpal bone(s), site unspecified     Right 5th metacarpal fx--caught in a door     Closed fracture of rib(s), unspecified ~     Contact dermatitis and other eczema, due to unspecified cause      Disorder of bone and cartilage, unspecified ~2000    T-score -2.00 in L-spine in      Lumbago      Premature menopause     Elevated FSH/LH in --normal brain MRI 2001     Past Surgical History:   Procedure Laterality Date     ABDOMEN SURGERY  , ,     3 C-sections     BIOPSY  , ?    breast lumps - benign     BREAST SURGERY  ,     benign lumps     COLONOSCOPY  ,      ZZC APPENDECTOMY  1971     ZZC  DELIVERY ONLY      Primary C/S - FTP     ZZC  DELIVERY ONLY      Repeat C/S     ZZC  DELIVERY ONLY      Repeat C/S, TL     ZZC NONSPECIFIC PROCEDURE      Breast Bx - Right     Family Hx:  Family History   Problem Relation Age of Onset     Psychotic Disorder Mother         anxiety     Thyroid Disease Mother         Born [birth year]. Has CAD, two stents placed.      C.A.D. Mother         Had MI/stents x 3 at age 84.     Cancer Mother         BCC of face     Coronary Artery Disease Mother         at age 84 - stents     Other Cancer Mother         lung cancer - age 92 non-smoker     Family History Negative Father         Born 191. Resides in SNF. Has spinal stenosis, difficulty ambulating, also short term memory loss.      Prostate Cancer Father         age 65 or so     Family History Negative Sister         Brief period of anxiety, born      Thyroid Disease Sister         Non cancerous growth on thyroid removed     Thyroid Disease Sister      Psychotic Disorder Brother         anxiety/Born 194     Depression Brother      Breast Cancer Maternal Grandmother      Family  History Negative Maternal Grandfather          age 74, pulmonary embolism. Had colitis     Cancer Paternal Grandfather         ?stomach CA (was a digestive organ)     Gastrointestinal Disease Son         gastroesophageal reflux     Neurologic Disorder Son         seizure disorder     Colon Polyps Son      Respiratory Child         mild asthma/mild asthma     Breast Cancer Other              Social Hx:  Social History     Socioeconomic History     Marital status:      Spouse name: Ramu     Number of children: 2     Years of education: Not on file     Highest education level: Bachelor's degree (e.g., BA, AB, BS)   Occupational History     Occupation: Teacher     Employer: NONE      Comment: Part-time    Tobacco Use     Smoking status: Never Smoker     Smokeless tobacco: Never Used   Substance and Sexual Activity     Alcohol use: No     Drug use: No     Sexual activity: Yes     Partners: Male     Birth control/protection: Post-menopausal     Comment: 1 partner -    Other Topics Concern      Service Not Asked     Blood Transfusions Not Asked     Caffeine Concern Not Asked     Occupational Exposure Not Asked     Hobby Hazards Not Asked     Sleep Concern Not Asked     Stress Concern Not Asked     Weight Concern Not Asked     Special Diet Not Asked     Back Care Not Asked     Exercise Not Asked     Bike Helmet Not Asked     Seat Belt Not Asked     Self-Exams Not Asked     Parent/sibling w/ CABG, MI or angioplasty before 65F 55M? No   Social History Narrative    Has 3 sons--middle son mentally handicapped, has seizure disorder and behavioral concerns.        Works as  for [employer], previously         Originally from [place]         is a      Social Determinants of Health     Financial Resource Strain: Not on file   Food Insecurity: Not on file   Transportation Needs: Not on file   Physical Activity: Not on file   Stress: Not on  file   Social Connections: Not on file   Intimate Partner Violence: Not on file   Housing Stability: Not on file          MEDICATIONS:  currently has no medications in their medication list.    ROS     ROS: 10 point ROS neg other than the symptoms noted above in the HPI.    Physical Exam   VS: There were no vitals taken for this visit.  GENERAL: healthy, alert and no distress  EYES: Eyes grossly normal to inspection, conjunctivae and sclerae normal  ENT: no nose swelling, nasal discharge.  Thyroid: no apparent thyroid nodules  RESP: no audible wheeze, cough, or visible cyanosis.  No visible retractions or increased work of breathing.  Able to speak fully in complete sentences.  ABDO: not evaluated.  EXTREMITIES: no hand tremors.  NEURO: Cranial nerves grossly intact, mentation intact and speech normal  SKIN: No apparent skin lesions, rash or edema seen   PSYCH: mentation appears normal, affect normal/bright, judgement and insight intact, normal speech and appearance well-groomed    LABS:  BMP:  Last Basic Metabolic Panel:  Lab Results   Component Value Date     09/21/2021     08/28/2020      Lab Results   Component Value Date    POTASSIUM 4.0 09/21/2021    POTASSIUM 4.0 08/28/2020     Lab Results   Component Value Date    CHLORIDE 109 09/21/2021    CHLORIDE 110 08/28/2020     Lab Results   Component Value Date    MONE 7.9 09/21/2021    MONE 8.8 08/28/2020     Lab Results   Component Value Date    CO2 29 09/21/2021    CO2 26 08/28/2020     Lab Results   Component Value Date    BUN 11 09/21/2021    BUN 12 08/28/2020     Lab Results   Component Value Date    CR 0.87 09/21/2021    CR 0.74 08/28/2020     Lab Results   Component Value Date    GLC 86 09/21/2021    GLC 91 08/28/2020       TFTs:  Lab Results   Component Value Date    TSH 0.84 09/21/2021    TSH 0.69 11/03/2014       LFTs:  Liver Function Studies -   Recent Labs   Lab Test 09/21/21  1050   PROTTOTAL 6.9   ALBUMIN 3.8   BILITOTAL 0.6   ALKPHOS 52    AST 18   ALT 38       PTH:    Vitamin D:  Vitamin D Deficiency Screening Results:  Lab Results   Component Value Date    VITDT 23 09/21/2021    VITDT 25 08/28/2020    VITDT 20 09/27/2019    VITDT 29 09/21/2018    VITDT 19 (L) 06/27/2018       BoneTurnOverMarkers:    DEXA:  RISK FACTORS:  Post-menopausal, Fractures of ribs, hand, follow up osteoporosis     CURRENT TREATMENT:  Calcium with Vitamin D, previous Fosamax, stopped approximately 2008     FINDINGS:               Lumbar Spine (L1-L4)      T-score:  -3.0, degenerative changes present               Left Femoral Neck            T-score:  -1.8               Right Femoral Neck          T-score:  -2.0                   Lumbar (L1-L4) BMD: 0.818  Previous: 0.875                          Total Hip Mean BMD: 0.845  Previous: 0.859     Comparison is made to another DXA performed on the same Lunar [employer]dig[employer]  machine on 07/26/2018.        IMPRESSION  Osteoporosis., Degenerative changes of the lumbar spine which may falsely elevate results.       All pertinent notes, labs, and images personally reviewed by me.     A/P  Ms.Louise LATA Ruiz is a 62 year old here for the evaluation of:    #1 Osteoporosis:  H/o Ostepenia in her 40s.  DEXA 4/2022: Osteoporosis. There has been significant decrease in bone density of the lumbar spine. There has been no significant change in bone density of the hip(s).   Her risk factors include Post-menopausal, Fractures of ribs, hand, follow up osteoporosis.  She had been on Actonel and Fosamax in past.  Not on medication since 2010.  Plan:  Discussed diagnosis, pathophysiology, management and treatment options of condition with pt.  Start FOSAMAX 70 mg/week.  DEXA in 1-2 years. ( Based on insurance)  Follow up in 1 year.    The pt was advised to    Maintain an adequate calcium and vitamin D intake and to supplement vitamin D if needed to maintain serum levels of 25 hydroxy D (25 OH D) between 30-60 ng/ml.    Limit alcohol intake to no more  than 2 servings per day.    Limit caffeine intake.    Maintain an active lifestyle including weight-bearing exercises for at least 30 mins daily.    Take measures to reduce the risk of falling.  Discussed indications, risks and benefits of all medications prescribed, and answered questions to patient's satisfaction.      Instructions on Fosamax use and side effects - particularly esophageal adverse events - are carefully reviewed with her. This drug must be taken upon arising for the day on an empty stomach, with a large 6-8 ounce glass of water; she must remain NPO in the upright position for at least 30 minutes afterwards and until after the first food of the day. If esophageal irritation is noted, she will stop the drug and call my office.  Treatment with bisphosphonate therapy will decrease fracture risk 50-70%.   There is risk of osteonecrosis of the jaw in patients using bisphosphonates is approximately 1/1700-1/100,000, with development most likely related to invasive dental procedures. If an invasive dental procedure was necessary, preferably stop the bisphosphonate approximately 3 months prior to reduce the risk. Let your dentist know that you are on this medication.  The data available at this time suggests that there is probably a small increase risk of atypical (nontraumatic) subtrochanteric fractures of the femur in patients on bisphosphonate therapy compared to those not on it. One large study suggested that for every 100 fractures prevented with bisphosphonate therapy, less than one femur fracture will occur. Other studies suggest one episode per 2,500 patient years. Patient should call with leg pain.            More than 50% of the time spent with Ms. Ruiz on counseling / coordinating her care.   All questions were answered.  The patient indicates understanding of the above issues and agrees with the plan set forth.    Follow-up:  1 year.    Chelsea Kenny MD  Endocrinology  [facility]  [place]  CC: Maday Anaya    Answers for HPI/ROS submitted by the patient on 8/8/2022  General Symptoms: No  Skin Symptoms: No  HENT Symptoms: No  EYE SYMPTOMS: Yes  HEART SYMPTOMS: No  LUNG SYMPTOMS: No  INTESTINAL SYMPTOMS: Yes  URINARY SYMPTOMS: No  GYNECOLOGIC SYMPTOMS: No  BREAST SYMPTOMS: No  SKELETAL SYMPTOMS: Yes  BLOOD SYMPTOMS: No  NERVOUS SYSTEM SYMPTOMS: No  MENTAL HEALTH SYMPTOMS: No  Eye pain: No  Vision loss: No  Dry eyes: Yes  Watery eyes: No  Eye bulging: No  Double vision: No  Flashing of lights: No  Spots: No  Floaters: No  Redness: No  Crossed eyes: No  Tunnel Vision: No  Yellowing of eyes: No  Eye irritation: Yes  Heart burn or indigestion: Yes  Nausea: No  Vomiting: No  Abdominal pain: No  Bloating: No  Constipation: No  Diarrhea: No  Blood in stool: No  Black stools: No  Rectal or Anal pain: No  Fecal incontinence: No  Yellowing of skin or eyes: No  Vomit with blood: No  Change in stools: No  Back pain: No  Muscle aches: Yes  Neck pain: No  Swollen joints: No  Joint pain: No  Bone pain: No  Muscle cramps: No  Muscle weakness: No  Joint stiffness: No  Bone fracture: No          Again, thank you for allowing me to participate in the care of your patient.        Sincerely,        Chelsea Kenny MD

## 2022-08-15 NOTE — PATIENT INSTRUCTIONS
-[facility]  Dr Kenny, Endocrinology Department    [facility]   [address] [id number]  [place] [address]  Appointment [phone]  Fax: [phone]  [place]: Monday - Thursday      Start FOSAMAX 70 mg/week.  DEXA in 1-2 years. ( Based on insurance)  Follow up after that.    The pt was advised to  Maintain an adequate calcium and vitamin D intake and to supplement vitamin D if needed to maintain serum levels of 25 hydroxy D (25 OH D) between 30-60 ng/ml.  Limit alcohol intake to no more than 2 servings per day.  Limit caffeine intake.  Maintain an active lifestyle including weight-bearing exercises for at least 30 mins daily.  Take measures to reduce the risk of falling.    You should get 1000- 1200 mg/day calcium in divided doses of no more than 500 mg/dose.  This INCLUDES what is in your food as well as what is in any supplements you may be taking.    Vit D about 800-1000 IU/day ( unless you have vit D deficiency- in that case higher dose)  Dietary sources of calcium:: These also contain vitamin D  Milk                            8 oz            300 mg calcium  Yogurt                          1 cup           400 mg calcium   Hard cheese                     1.5 oz          300 mg  Cottage cheese                  2 cup           300 mg  Orange juice with Calcium       8 oz            300 mg  Low fat dairy sources are recommended      You should get 30 minutes of moderate weight bearing exercise on most days of the week .  Weight bearing exercise includes such things as walking, jogging, hiking, dancing.  You should also get Strength training 2 or more times/week in addition to other weight -being exercise. Strength training uses weight or resistance beyond that seen in everyday activities -(pilates, weight training with free weights, weight machines or resistance bands)    Instructions on Fosamax use and side effects - particularly esophageal adverse  events - are carefully reviewed with her. This drug must be taken upon arising for the day on an empty stomach, with a large 6-8 ounce glass of water; she must remain NPO in the upright position for at least 30 minutes afterwards and until after the first food of the day. If esophageal irritation is noted, she will stop the drug and call my office.  Treatment with bisphosphonate therapy will decrease fracture risk 50-70%.   There is risk of osteonecrosis of the jaw in patients using bisphosphonates is approximately 1/1700-1/100,000, with development most likely related to invasive dental procedures. If an invasive dental procedure was necessary, preferably stop the bisphosphonate approximately 3 months prior to reduce the risk. Let your dentist know that you are on this medication.  The data available at this time suggests that there is probably a small increase risk of atypical (nontraumatic) subtrochanteric fractures of the femur in patients on bisphosphonate therapy compared to those not on it. One large study suggested that for every 100 fractures prevented with bisphosphonate therapy, less than one femur fracture will occur. Other studies suggest one episode per 2,500 patient years. Patient should call with leg pain.

## 2022-08-24 ENCOUNTER — TRANSFERRED RECORDS (OUTPATIENT)
Dept: HEALTH INFORMATION MANAGEMENT | Facility: CLINIC | Age: 62
End: 2022-08-24

## 2022-09-27 ENCOUNTER — OFFICE VISIT (OUTPATIENT)
Dept: URGENT CARE | Facility: URGENT CARE | Age: 62
End: 2022-09-27
Payer: COMMERCIAL

## 2022-09-27 VITALS
HEART RATE: 66 BPM | OXYGEN SATURATION: 99 % | WEIGHT: 155 LBS | DIASTOLIC BLOOD PRESSURE: 60 MMHG | SYSTOLIC BLOOD PRESSURE: 100 MMHG | BODY MASS INDEX: 22.89 KG/M2 | TEMPERATURE: 97.1 F

## 2022-09-27 DIAGNOSIS — R59.0 PREAURICULAR ADENOPATHY: Primary | ICD-10-CM

## 2022-09-27 PROCEDURE — 99213 OFFICE O/P EST LOW 20 MIN: CPT | Performed by: PHYSICIAN ASSISTANT

## 2022-09-27 ASSESSMENT — PAIN SCALES - GENERAL: PAINLEVEL: NO PAIN (0)

## 2022-09-27 NOTE — PROGRESS NOTES
ASSESSMENT/PLAN:    (R59.0) Preauricular adenopathy  (primary encounter diagnosis)  Comment: See below patient discharge for MDM/Plan     Discharge Summary:     September 27, 2022 [facility] Plan:    On exam today, you have a mildly enlarged preauricular lymph node in front of your right ear. Because you just noticed this 5 days ago--and have some associated itching--I agree with you that it's possible you had a bug bite in this area and subsequent swelling of this lymph node.     I advise you make a 2 week follow-up to see your primary care provider team to re-examine your head/neck and this enlarged lymph node. If the lymph node remains, your provider will let you know if you need further evaluation with an ear, nose and throat specialist, cancer screening, or other other evaluation.   --------------------    Latesha Ruiz presents to urgent care today for evaluation of a red, slightly itchy, lump in front of her right ear. Patient states she first noted this 5 days ago and suspected perhaps she got a bug bite in this area. She has tried some Benadryl with some temporary relief.     No acute right ear pain, hearing loss or ear drainage. No right upper tooth problems. No other facial lesions. No fever. No other ENT symptoms. No acute cough/cold symptoms.           Past Medical History:   Diagnosis Date     AK (actinic keratosis) 6/17/2013     Allergic rhinitis, cause unspecified      Arthritis 2013?    mild in thumbs, jaw     Closed fracture of metacarpal bone(s), site unspecified 12/04    Right 5th metacarpal fx--caught in a door     Closed fracture of rib(s), unspecified ~1995     Contact dermatitis and other eczema, due to unspecified cause      Disorder of bone and cartilage, unspecified ~4/2000    T-score -2.00 in L-spine in 5/03     Lumbago      Premature menopause     Elevated FSH/LH in 1993--normal brain MRI 12/2001     Patient Active Problem List   Diagnosis     Allergic rhinitis      Osteoporosis     CARDIOVASCULAR SCREENING; LDL GOAL LESS THAN 160     Lactose intolerance     Stress incontinence     Sebaceous hyperplasia of face     AK (actinic keratosis)     Vitamin D deficiency     Current Outpatient Medications   Medication     alendronate (FOSAMAX) 70 MG tablet     No current facility-administered medications for this visit.     Allergies   Allergen Reactions     Asa [Aspirin]      Codeine Nausea and Vomiting     Erythromycin Nausea     Penicillins Hives     OBJECTIVE:   /60   Pulse 66   Temp 97.1  F (36.2  C) (Tympanic)   Wt 70.3 kg (155 lb)   SpO2 99%   BMI 22.89 kg/m      GENERAL:  Very pleasant, comfortable and generally well appearing.  HEENT:   FACE: Positive for right sided, isolated, enlarged preauricular lymph node. No evidence of local infection. No evidence of facial cellulitis.   Conjunctiva without infection.  Sclera clear.  Left TM is normal: no effusions, no erythema, and normal landmarks.  Right TM is normal: no effusions, no erythema, and normal landmarks.  Nasal mucosa is normal.  Oropharyngeal exam is normal: no lesions, erythema, adenopathy or exudate.  Neck is supple. FROM. No anterior or posterior cervical adenopathy.

## 2022-09-27 NOTE — PATIENT INSTRUCTIONS
September 27, 2022 [facility] Plan:    On exam today, you have a mildly enlarged preauricular lymph node in front of your right ear. Because you just noticed this 5 days ago--and have some associated itching--I agree with you that it's possible you had a bug bite in this area and subsequent swelling of this lymph node.     I advise you make a 2 week follow-up to see your primary care provider team to re-examine your head/neck and this enlarged lymph node. If the lymph node remains, your provider will let you know if you need further evaluation with an ear, nose and throat specialist, cancer screening, or other other evaluation.

## 2022-10-13 ENCOUNTER — OFFICE VISIT (OUTPATIENT)
Dept: FAMILY MEDICINE | Facility: CLINIC | Age: 62
End: 2022-10-13
Payer: COMMERCIAL

## 2022-10-13 VITALS
RESPIRATION RATE: 16 BRPM | HEIGHT: 69 IN | WEIGHT: 156.4 LBS | HEART RATE: 61 BPM | OXYGEN SATURATION: 99 % | DIASTOLIC BLOOD PRESSURE: 58 MMHG | SYSTOLIC BLOOD PRESSURE: 108 MMHG | TEMPERATURE: 98 F | BODY MASS INDEX: 23.16 KG/M2

## 2022-10-13 DIAGNOSIS — Z13.6 CARDIOVASCULAR SCREENING; LDL GOAL LESS THAN 160: ICD-10-CM

## 2022-10-13 DIAGNOSIS — L30.9 DERMATITIS: Primary | ICD-10-CM

## 2022-10-13 DIAGNOSIS — E55.9 VITAMIN D DEFICIENCY: ICD-10-CM

## 2022-10-13 DIAGNOSIS — Z23 NEED FOR PROPHYLACTIC VACCINATION AND INOCULATION AGAINST INFLUENZA: ICD-10-CM

## 2022-10-13 LAB
ALBUMIN SERPL-MCNC: 3.9 G/DL (ref 3.4–5)
ALP SERPL-CCNC: 65 U/L (ref 40–150)
ALT SERPL W P-5'-P-CCNC: 69 U/L (ref 0–50)
ANION GAP SERPL CALCULATED.3IONS-SCNC: 7 MMOL/L (ref 3–14)
AST SERPL W P-5'-P-CCNC: 35 U/L (ref 0–45)
BILIRUB SERPL-MCNC: 0.7 MG/DL (ref 0.2–1.3)
BUN SERPL-MCNC: 16 MG/DL (ref 7–30)
CALCIUM SERPL-MCNC: 8.8 MG/DL (ref 8.5–10.1)
CHLORIDE BLD-SCNC: 107 MMOL/L (ref 94–109)
CHOLEST SERPL-MCNC: 223 MG/DL
CO2 SERPL-SCNC: 26 MMOL/L (ref 20–32)
CREAT SERPL-MCNC: 0.81 MG/DL (ref 0.52–1.04)
ERYTHROCYTE [DISTWIDTH] IN BLOOD BY AUTOMATED COUNT: 12.5 % (ref 10–15)
FASTING STATUS PATIENT QL REPORTED: YES
GFR SERPL CREATININE-BSD FRML MDRD: 81 ML/MIN/1.73M2
GLUCOSE BLD-MCNC: 91 MG/DL (ref 70–99)
HCT VFR BLD AUTO: 44.5 % (ref 35–47)
HDLC SERPL-MCNC: 59 MG/DL
HGB BLD-MCNC: 14.7 G/DL (ref 11.7–15.7)
LDLC SERPL CALC-MCNC: 135 MG/DL
MCH RBC QN AUTO: 28.6 PG (ref 26.5–33)
MCHC RBC AUTO-ENTMCNC: 33 G/DL (ref 31.5–36.5)
MCV RBC AUTO: 87 FL (ref 78–100)
NONHDLC SERPL-MCNC: 164 MG/DL
PLATELET # BLD AUTO: 249 10E3/UL (ref 150–450)
POTASSIUM BLD-SCNC: 3.9 MMOL/L (ref 3.4–5.3)
PROT SERPL-MCNC: 7.2 G/DL (ref 6.8–8.8)
RBC # BLD AUTO: 5.14 10E6/UL (ref 3.8–5.2)
SODIUM SERPL-SCNC: 140 MMOL/L (ref 133–144)
TRIGL SERPL-MCNC: 143 MG/DL
TSH SERPL DL<=0.005 MIU/L-ACNC: 1.14 MU/L (ref 0.4–4)
WBC # BLD AUTO: 5.6 10E3/UL (ref 4–11)

## 2022-10-13 PROCEDURE — 84443 ASSAY THYROID STIM HORMONE: CPT | Performed by: NURSE PRACTITIONER

## 2022-10-13 PROCEDURE — 90471 IMMUNIZATION ADMIN: CPT | Performed by: NURSE PRACTITIONER

## 2022-10-13 PROCEDURE — 80053 COMPREHEN METABOLIC PANEL: CPT | Performed by: NURSE PRACTITIONER

## 2022-10-13 PROCEDURE — 85027 COMPLETE CBC AUTOMATED: CPT | Performed by: NURSE PRACTITIONER

## 2022-10-13 PROCEDURE — 82306 VITAMIN D 25 HYDROXY: CPT | Performed by: NURSE PRACTITIONER

## 2022-10-13 PROCEDURE — 80061 LIPID PANEL: CPT | Performed by: NURSE PRACTITIONER

## 2022-10-13 PROCEDURE — 90682 RIV4 VACC RECOMBINANT DNA IM: CPT | Performed by: NURSE PRACTITIONER

## 2022-10-13 PROCEDURE — 99213 OFFICE O/P EST LOW 20 MIN: CPT | Mod: 25 | Performed by: NURSE PRACTITIONER

## 2022-10-13 PROCEDURE — 36415 COLL VENOUS BLD VENIPUNCTURE: CPT | Performed by: NURSE PRACTITIONER

## 2022-10-13 RX ORDER — PANTOPRAZOLE SODIUM 40 MG/1
40 TABLET, DELAYED RELEASE ORAL 2 TIMES DAILY
COMMUNITY
Start: 2022-08-25 | End: 2023-11-15

## 2022-10-13 RX ORDER — LORATADINE 10 MG/1
10 TABLET ORAL DAILY
COMMUNITY
End: 2023-01-05

## 2022-10-13 ASSESSMENT — PAIN SCALES - GENERAL: PAINLEVEL: NO PAIN (0)

## 2022-10-13 NOTE — PROGRESS NOTES
Assessment & Plan     Dermatitis  Acute improving; site has decreased in swelling/inflammation per patient photos; advised to continue taking Claritin to help with itching; may use lotion to site BID. Does not appear to be a lymph node; d/t site improving over the past 3 weeks I do not have current concerns of skin CA or need for a biopsy at this time;  advised to follow up if site does not completely resolve and/or worsens may also make a dermatology appointment in the meantime while waiting.     CARDIOVASCULAR SCREENING; LDL GOAL LESS THAN 160  Routine lab work/testing.     - CBC with platelets; Future  - Comprehensive metabolic panel (BMP + Alb, Alk Phos, ALT, AST, Total. Bili, TP); Future  - TSH with free T4 reflex; Future  - Lipid panel reflex to direct LDL Fasting; Futureng    Vitamin D deficiency  Chronic stable; will check labs    - Vitamin D Deficiency; Future    Need for prophylactic vaccination and inoculation against influenza  Routine vaccination  - INFLUENZA QUAD, RECOMBINANT, P-FREE (RIV4) (FLUBLOK)      Return in about 1 month (around 11/13/2022) for if symptoms do not improve and/or worsen.    JOCELYN Roberson CNP  [facility] CATIA Charlton is a 62 year old, presenting for the following health issues:  Hospital F/U (Urgent Care) and Imm/Inj (Flu Shot)      HPI       Hospital Follow-up Visit:    Hospital/Nursing Home/IP Rehab Facility: [facility] JOCELYNE Jacobsen  Date of Admission: 9/27/22  Date of Discharge: 9/27/22  Reason(s) for Admission: Rash    Was your hospitalization related to COVID-19? No   Problems taking medications regularly:  None  Medication changes since discharge: None  Problems adhering to non-medication therapy:  None    Summary of hospitalization:  [facility] discharge summary reviewed  Diagnostic Tests/Treatments reviewed.  Follow up needed: none  Other Healthcare Providers Involved in Patient s Care:         None  Update since  "discharge: Still has a slight rash, taking Claritin so it no longer itches.     Plan of care communicated with patient     Patient presents to clinic s/p recent UC visit for an area noted to her face; was noted to be periauricular adenopathy; occurred 3 weeks ago cleaning out [employer]; was red, itching, and raised appeared to patient like one \"large hive.\" Denies any new/changes and does not recall a bug bite specifically. States the site is improving but continues to be there. She is taking Claritin OTC to help with the itching states it improves the itching. Patient does have concerns of skin CA and was wondering about a biopsy.       Review of Systems   Constitutional, HEENT, cardiovascular, pulmonary, gi and gu systems are negative, except as otherwise noted.      Objective    /58 (BP Location: Right arm, Patient Position: Sitting, Cuff Size: Adult Regular)   Pulse 61   Temp 98  F (36.7  C) (Oral)   Resp 16   Ht 1.749 m (5' 8.86\")   Wt 70.9 kg (156 lb 6.4 oz)   SpO2 99%   BMI 23.19 kg/m      Physical Exam   GENERAL: healthy, alert and no distress  NECK: no adenopathy, no asymmetry, masses, or scars and thyroid normal to palpation  RESP: lungs clear to auscultation - no rales, rhonchi or wheezes  CV: regular rate and rhythm, normal S1 S2, no S3 or S4, no murmur, click or rub, no peripheral edema and peripheral pulses strong  SKIN: small area noted to right side of face/cheek area dry flaky skin, erythematous, mild inflammation, no visible bite ameya              "

## 2022-10-17 LAB — DEPRECATED CALCIDIOL+CALCIFEROL SERPL-MC: 28 UG/L (ref 20–75)

## 2022-11-07 ENCOUNTER — MYC MEDICAL ADVICE (OUTPATIENT)
Dept: PEDIATRICS | Facility: CLINIC | Age: 62
End: 2022-11-07

## 2022-11-07 ENCOUNTER — TRANSFERRED RECORDS (OUTPATIENT)
Dept: HEALTH INFORMATION MANAGEMENT | Facility: CLINIC | Age: 62
End: 2022-11-07

## 2022-11-07 NOTE — TELEPHONE ENCOUNTER
I'd recommend placing an Evisit regarding these concerns also OK to schedule for a JARED spot if patient prefers meeting face to face.    Maday Anaya MD

## 2022-11-08 ENCOUNTER — E-VISIT (OUTPATIENT)
Dept: PEDIATRICS | Facility: CLINIC | Age: 62
End: 2022-11-08
Payer: COMMERCIAL

## 2022-11-08 DIAGNOSIS — R79.89 ELEVATED LIVER FUNCTION TESTS: Primary | ICD-10-CM

## 2022-11-08 PROCEDURE — 99422 OL DIG E/M SVC 11-20 MIN: CPT | Performed by: PEDIATRICS

## 2022-11-08 NOTE — TELEPHONE ENCOUNTER
lvm to relay pcp messge.    Also sent [employer] message to schedule evisit or call station to be scheduled.

## 2022-11-15 ENCOUNTER — LAB (OUTPATIENT)
Dept: LAB | Facility: CLINIC | Age: 62
End: 2022-11-15
Payer: COMMERCIAL

## 2022-11-15 DIAGNOSIS — R79.89 ELEVATED LIVER FUNCTION TESTS: ICD-10-CM

## 2022-11-15 LAB
ALBUMIN SERPL BCG-MCNC: 4.2 G/DL (ref 3.5–5.2)
ALP SERPL-CCNC: 66 U/L (ref 35–104)
ALT SERPL W P-5'-P-CCNC: 64 U/L (ref 10–35)
AST SERPL W P-5'-P-CCNC: 49 U/L (ref 10–35)
BILIRUB DIRECT SERPL-MCNC: <0.2 MG/DL (ref 0–0.3)
BILIRUB SERPL-MCNC: 0.5 MG/DL
HAV IGM SERPL QL IA: NONREACTIVE
HBV CORE IGM SERPL QL IA: NONREACTIVE
HBV SURFACE AB SERPL IA-ACNC: 0.99 M[IU]/ML
HBV SURFACE AB SERPL IA-ACNC: NONREACTIVE M[IU]/ML
HBV SURFACE AG SERPL QL IA: NONREACTIVE
HCV AB SERPL QL IA: NONREACTIVE
PROT SERPL-MCNC: 6.8 G/DL (ref 6.4–8.3)

## 2022-11-15 PROCEDURE — 36415 COLL VENOUS BLD VENIPUNCTURE: CPT

## 2022-11-15 PROCEDURE — 82040 ASSAY OF SERUM ALBUMIN: CPT

## 2022-11-15 PROCEDURE — 86706 HEP B SURFACE ANTIBODY: CPT

## 2022-11-15 PROCEDURE — 87340 HEPATITIS B SURFACE AG IA: CPT

## 2022-11-20 ENCOUNTER — HEALTH MAINTENANCE LETTER (OUTPATIENT)
Age: 62
End: 2022-11-20

## 2022-11-29 ENCOUNTER — HOSPITAL ENCOUNTER (OUTPATIENT)
Dept: ULTRASOUND IMAGING | Facility: CLINIC | Age: 62
Discharge: HOME | End: 2022-11-29
Attending: PEDIATRICS | Admitting: PEDIATRICS
Payer: COMMERCIAL

## 2022-11-29 DIAGNOSIS — R79.89 ELEVATED LIVER FUNCTION TESTS: Primary | ICD-10-CM

## 2022-11-29 DIAGNOSIS — R79.89 ELEVATED LIVER FUNCTION TESTS: ICD-10-CM

## 2022-11-29 PROCEDURE — 76705 ECHO EXAM OF ABDOMEN: CPT

## 2022-12-30 SDOH — ECONOMIC STABILITY: INCOME INSECURITY: HOW HARD IS IT FOR YOU TO PAY FOR THE VERY BASICS LIKE FOOD, HOUSING, MEDICAL CARE, AND HEATING?: NOT HARD AT ALL

## 2022-12-30 SDOH — ECONOMIC STABILITY: TRANSPORTATION INSECURITY
IN THE PAST 12 MONTHS, HAS THE LACK OF TRANSPORTATION KEPT YOU FROM MEDICAL APPOINTMENTS OR FROM GETTING MEDICATIONS?: NO

## 2022-12-30 SDOH — ECONOMIC STABILITY: FOOD INSECURITY: WITHIN THE PAST 12 MONTHS, THE FOOD YOU BOUGHT JUST DIDN'T LAST AND YOU DIDN'T HAVE MONEY TO GET MORE.: NEVER TRUE

## 2022-12-30 SDOH — HEALTH STABILITY: PHYSICAL HEALTH: ON AVERAGE, HOW MANY DAYS PER WEEK DO YOU ENGAGE IN MODERATE TO STRENUOUS EXERCISE (LIKE A BRISK WALK)?: 7 DAYS

## 2022-12-30 SDOH — HEALTH STABILITY: PHYSICAL HEALTH: ON AVERAGE, HOW MANY MINUTES DO YOU ENGAGE IN EXERCISE AT THIS LEVEL?: 30 MIN

## 2022-12-30 SDOH — ECONOMIC STABILITY: TRANSPORTATION INSECURITY
IN THE PAST 12 MONTHS, HAS LACK OF TRANSPORTATION KEPT YOU FROM MEETINGS, WORK, OR FROM GETTING THINGS NEEDED FOR DAILY LIVING?: NO

## 2022-12-30 SDOH — ECONOMIC STABILITY: INCOME INSECURITY: IN THE LAST 12 MONTHS, WAS THERE A TIME WHEN YOU WERE NOT ABLE TO PAY THE MORTGAGE OR RENT ON TIME?: NO

## 2022-12-30 SDOH — ECONOMIC STABILITY: FOOD INSECURITY: WITHIN THE PAST 12 MONTHS, YOU WORRIED THAT YOUR FOOD WOULD RUN OUT BEFORE YOU GOT MONEY TO BUY MORE.: NEVER TRUE

## 2022-12-30 ASSESSMENT — ENCOUNTER SYMPTOMS
ABDOMINAL PAIN: 0
DIZZINESS: 0
HEMATURIA: 0
NAUSEA: 0
ARTHRALGIAS: 0
HEARTBURN: 1
EYE PAIN: 0
SORE THROAT: 0
CHILLS: 0
MYALGIAS: 0
DIARRHEA: 0
HEADACHES: 0
HEMATOCHEZIA: 0
DYSURIA: 0
NERVOUS/ANXIOUS: 0
COUGH: 0
PARESTHESIAS: 0
SHORTNESS OF BREATH: 0
FREQUENCY: 0
BREAST MASS: 0
CONSTIPATION: 0
PALPITATIONS: 0
JOINT SWELLING: 0
FEVER: 0
WEAKNESS: 0

## 2022-12-30 ASSESSMENT — LIFESTYLE VARIABLES
HOW OFTEN DO YOU HAVE SIX OR MORE DRINKS ON ONE OCCASION: NEVER
HOW MANY STANDARD DRINKS CONTAINING ALCOHOL DO YOU HAVE ON A TYPICAL DAY: PATIENT DOES NOT DRINK
HOW OFTEN DO YOU HAVE A DRINK CONTAINING ALCOHOL: NEVER
AUDIT-C TOTAL SCORE: 0
SKIP TO QUESTIONS 9-10: 1

## 2022-12-30 ASSESSMENT — SOCIAL DETERMINANTS OF HEALTH (SDOH)
DO YOU BELONG TO ANY CLUBS OR ORGANIZATIONS SUCH AS CHURCH GROUPS UNIONS, FRATERNAL OR ATHLETIC GROUPS, OR SCHOOL GROUPS?: YES
HOW OFTEN DO YOU GET TOGETHER WITH FRIENDS OR RELATIVES?: TWICE A WEEK
HOW OFTEN DO YOU ATTEND CHURCH OR RELIGIOUS SERVICES?: MORE THAN 4 TIMES PER YEAR
IN A TYPICAL WEEK, HOW MANY TIMES DO YOU TALK ON THE PHONE WITH FAMILY, FRIENDS, OR NEIGHBORS?: MORE THAN THREE TIMES A WEEK

## 2023-01-05 ENCOUNTER — OFFICE VISIT (OUTPATIENT)
Dept: PEDIATRICS | Facility: CLINIC | Age: 63
End: 2023-01-05
Payer: COMMERCIAL

## 2023-01-05 VITALS
HEART RATE: 66 BPM | HEIGHT: 69 IN | TEMPERATURE: 97.6 F | SYSTOLIC BLOOD PRESSURE: 98 MMHG | WEIGHT: 150.9 LBS | DIASTOLIC BLOOD PRESSURE: 62 MMHG | BODY MASS INDEX: 22.35 KG/M2 | OXYGEN SATURATION: 98 %

## 2023-01-05 DIAGNOSIS — Z12.4 CERVICAL CANCER SCREENING: ICD-10-CM

## 2023-01-05 DIAGNOSIS — R74.01 ELEVATED ALT MEASUREMENT: ICD-10-CM

## 2023-01-05 DIAGNOSIS — Z00.00 ROUTINE GENERAL MEDICAL EXAMINATION AT A HEALTH CARE FACILITY: Primary | ICD-10-CM

## 2023-01-05 DIAGNOSIS — R79.89 ELEVATED LIVER FUNCTION TESTS: ICD-10-CM

## 2023-01-05 LAB
ALBUMIN SERPL BCG-MCNC: 4.2 G/DL (ref 3.5–5.2)
ALP SERPL-CCNC: 56 U/L (ref 35–104)
ALT SERPL W P-5'-P-CCNC: 37 U/L (ref 10–35)
ANION GAP SERPL CALCULATED.3IONS-SCNC: 15 MMOL/L (ref 7–15)
AST SERPL W P-5'-P-CCNC: 28 U/L (ref 10–35)
BILIRUB DIRECT SERPL-MCNC: <0.2 MG/DL (ref 0–0.3)
BILIRUB SERPL-MCNC: 0.6 MG/DL
BUN SERPL-MCNC: 10.8 MG/DL (ref 8–23)
CALCIUM SERPL-MCNC: 8.7 MG/DL (ref 8.8–10.2)
CHLORIDE SERPL-SCNC: 107 MMOL/L (ref 98–107)
CREAT SERPL-MCNC: 0.83 MG/DL (ref 0.51–0.95)
DEPRECATED HCO3 PLAS-SCNC: 21 MMOL/L (ref 22–29)
GFR SERPL CREATININE-BSD FRML MDRD: 79 ML/MIN/1.73M2
GLUCOSE SERPL-MCNC: 82 MG/DL (ref 70–99)
POTASSIUM SERPL-SCNC: 3.9 MMOL/L (ref 3.4–5.3)
PROT SERPL-MCNC: 6.5 G/DL (ref 6.4–8.3)
SODIUM SERPL-SCNC: 143 MMOL/L (ref 136–145)

## 2023-01-05 PROCEDURE — 82248 BILIRUBIN DIRECT: CPT | Performed by: INTERNAL MEDICINE

## 2023-01-05 PROCEDURE — 80053 COMPREHEN METABOLIC PANEL: CPT | Performed by: INTERNAL MEDICINE

## 2023-01-05 PROCEDURE — G0123 SCREEN CERV/VAG THIN LAYER: HCPCS | Performed by: INTERNAL MEDICINE

## 2023-01-05 PROCEDURE — 99396 PREV VISIT EST AGE 40-64: CPT | Performed by: INTERNAL MEDICINE

## 2023-01-05 PROCEDURE — 87624 HPV HI-RISK TYP POOLED RSLT: CPT | Performed by: INTERNAL MEDICINE

## 2023-01-05 PROCEDURE — 36415 COLL VENOUS BLD VENIPUNCTURE: CPT | Performed by: INTERNAL MEDICINE

## 2023-01-05 ASSESSMENT — ENCOUNTER SYMPTOMS
PARESTHESIAS: 0
NAUSEA: 0
JOINT SWELLING: 0
FREQUENCY: 0
SORE THROAT: 0
HEADACHES: 0
SHORTNESS OF BREATH: 0
ABDOMINAL PAIN: 0
HEARTBURN: 1
PALPITATIONS: 0
FEVER: 0
MYALGIAS: 0
DIZZINESS: 0
BREAST MASS: 0
ARTHRALGIAS: 0
HEMATURIA: 0
CHILLS: 0
HEMATOCHEZIA: 0
DIARRHEA: 0
COUGH: 0
DYSURIA: 0
NERVOUS/ANXIOUS: 0
WEAKNESS: 0
CONSTIPATION: 0
EYE PAIN: 0

## 2023-01-05 ASSESSMENT — PAIN SCALES - GENERAL: PAINLEVEL: NO PAIN (0)

## 2023-01-05 NOTE — PROGRESS NOTES
SUBJECTIVE:   CC: Latesha is an 62 year old who presents for preventive health visit.   Patient has been advised of split billing requirements and indicates understanding: Yes  Healthy Habits:     Getting at least 3 servings of Calcium per day:  NO    Bi-annual eye exam:  Yes    Dental care twice a year:  Yes    Sleep apnea or symptoms of sleep apnea:  None    Diet:  Regular (no restrictions)    Frequency of exercise:  2-3 days/week    Duration of exercise:  15-30 minutes    Taking medications regularly:  Yes    PHQ-2 Total Score: 0    Additional concerns today:  Yes    Wondering about getting liver enzymes rechecked as these had been elevated. Recent normal US.         Today's PHQ-2 Score:   PHQ-2 ( 1999 Pfizer) 12/30/2022   Q1: Little interest or pleasure in doing things 0   Q2: Feeling down, depressed or hopeless 0   PHQ-2 Score 0   PHQ-2 Total Score (12-17 Years)- Positive if 3 or more points; Administer PHQ-A if positive -   Q1: Little interest or pleasure in doing things Not at all   Q2: Feeling down, depressed or hopeless Not at all   PHQ-2 Score 0           Social History     Tobacco Use     Smoking status: Never     Smokeless tobacco: Never   Substance Use Topics     Alcohol use: No         Alcohol Use 12/30/2022   Prescreen: >3 drinks/day or >7 drinks/week? Not Applicable   Prescreen: >3 drinks/day or >7 drinks/week? -       Reviewed orders with patient.  Reviewed health maintenance and updated orders accordingly - Yes  Patient Active Problem List   Diagnosis     Allergic rhinitis     Osteoporosis     CARDIOVASCULAR SCREENING; LDL GOAL LESS THAN 160     Lactose intolerance     Stress incontinence     Sebaceous hyperplasia of face     AK (actinic keratosis)     Vitamin D deficiency     Past Surgical History:   Procedure Laterality Date     ABDOMEN SURGERY  1987, 1990, 1994    3 C-sections     BIOPSY  1983, ?    breast lumps - benign     BREAST SURGERY  1983, 1990    benign lumps     COLONOSCOPY  2007,  2017     ZZC APPENDECTOMY  1971     ZZC  DELIVERY ONLY      Primary C/S - FTP     ZZC  DELIVERY ONLY      Repeat C/S     ZZC  DELIVERY ONLY      Repeat C/S, TL     ZZC NONSPECIFIC PROCEDURE  1989    Breast Bx - Right       Social History     Tobacco Use     Smoking status: Never     Smokeless tobacco: Never   Substance Use Topics     Alcohol use: No     Family History   Problem Relation Age of Onset     Psychotic Disorder Mother         anxiety     Thyroid Disease Mother         Born 1921. Has CAD, two stents placed.      C.A.D. Mother         Had MI/stents x 3 at age 84.     Cancer Mother         BCC of face     Coronary Artery Disease Mother         at age 84 - stents     Other Cancer Mother         lung cancer - age 92 non-smoker     Family History Negative Father         Born 191. Resides in SNF. Has spinal stenosis, difficulty ambulating, also short term memory loss.      Prostate Cancer Father         age 65 or so     Family History Negative Sister         Brief period of anxiety, born 1953     Thyroid Disease Sister         Non cancerous growth on thyroid removed     Thyroid Disease Sister      Psychotic Disorder Brother         anxiety/Born 1949     Depression Brother      Breast Cancer Maternal Grandmother      Family History Negative Maternal Grandfather          age 74, pulmonary embolism. Had colitis     Cancer Paternal Grandfather         ?stomach CA (was a digestive organ)     Gastrointestinal Disease Son         gastroesophageal reflux     Neurologic Disorder Son         seizure disorder     Colon Polyps Son      Respiratory Child         mild asthma/mild asthma     Breast Cancer Other            Breast Cancer Screening:    FHS-7:   Breast CA Risk Assessment (FHS-7) 2022   Did any of your first-degree relatives have breast or ovarian cancer? No No   Did any of your relatives have bilateral breast cancer? No No   Did any man in your family have  breast cancer? No No   Did any woman in your family have breast and ovarian cancer? No No   Did any woman in your family have breast cancer before age 50 y? Unknown No   Do you have 2 or more relatives with breast and/or ovarian cancer? Yes Yes   Do you have 2 or more relatives with breast and/or bowel cancer? Yes Yes     click delete button to remove this line now  Mammogram Screening: Recommended mammography every 1-2 years with patient discussion and risk factor consideration  Pertinent mammograms are reviewed under the imaging tab.    History of abnormal Pap smear: NO - age 30-65 PAP every 5 years with negative HPV co-testing recommended  PAP / HPV Latest Ref Rng & Units 9/27/2019 8/1/2016 11/3/2014   PAP (Historical) - NIL NIL NIL   HPV16 NEG:Negative Negative - -   HPV18 NEG:Negative Negative - -   HRHPV NEG:Negative Negative - -     Reviewed and updated as needed this visit by clinical staff   Tobacco  Allergies  Meds              Reviewed and updated as needed this visit by Provider     Meds                 Review of Systems   Constitutional: Negative for chills and fever.   HENT: Negative for congestion, ear pain, hearing loss and sore throat.    Eyes: Negative for pain and visual disturbance.   Respiratory: Negative for cough and shortness of breath.    Cardiovascular: Negative for chest pain, palpitations and peripheral edema.   Gastrointestinal: Positive for heartburn. Negative for abdominal pain, constipation, diarrhea, hematochezia and nausea.   Breasts:  Negative for tenderness, breast mass and discharge.   Genitourinary: Negative for dysuria, frequency, genital sores, hematuria, pelvic pain, urgency, vaginal bleeding and vaginal discharge.   Musculoskeletal: Negative for arthralgias, joint swelling and myalgias.   Skin: Negative for rash.   Neurological: Negative for dizziness, weakness, headaches and paresthesias.   Psychiatric/Behavioral: Negative for mood changes. The patient is not  "nervous/anxious.           OBJECTIVE:   BP (!) 74/52 (BP Location: Right arm, Patient Position: Sitting, Cuff Size: Adult Regular)   Pulse 66   Temp 97.6  F (36.4  C) (Tympanic)   Ht 1.753 m (5' 9\")   Wt 68.4 kg (150 lb 14.4 oz)   SpO2 98%   BMI 22.28 kg/m    Physical Exam  GENERAL: healthy, alert and no distress  EYES: Eyes grossly normal to inspection, PERRL and conjunctivae and sclerae normal  HENT: ear canals and TM's normal, nose and mouth without ulcers or lesions  NECK: no adenopathy, no asymmetry, masses, or scars and thyroid normal to palpation  RESP: lungs clear to auscultation - no rales, rhonchi or wheezes  CV: regular rate and rhythm, normal S1 S2, no S3 or S4, no murmur, click or rub, no peripheral edema and peripheral pulses strong  ABDOMEN: soft, nontender, no hepatosplenomegaly, no masses and bowel sounds normal   (female): normal female external genitalia, normal urethral meatus, vaginal mucosa pink, moist, well rugated, and normal cervix  MS: no gross musculoskeletal defects noted, no edema  SKIN: no suspicious lesions or rashes  NEURO: Normal strength and tone, mentation intact and speech normal  PSYCH: mentation appears normal, affect normal/bright    Diagnostic Test Results:  Labs reviewed in Epic    ASSESSMENT/PLAN:   1. Routine general medical examination at a health care facility  Counseling as below. Pap today. Declines bone density testing at this time, previously had issues with oral bisphosphonates and does not want to reconsider treatment at this time.     2. Cervical cancer screening  - Pap Screen with HPV - recommended age 30 - 65 years    3. Elevated ALT measurement  Will plan for lab recheck today. If still elevated would consider further lab work-up vs possible GI referral.   - Hepatic panel (Albumin, ALT, AST, Bili, Alk Phos, TP); Future  - Bilirubin direct    4. Elevated liver function tests  As above.   - **Comprehensive metabolic panel FUTURE " 2mo          COUNSELING:  Reviewed preventive health counseling, as reflected in patient instructions       Regular exercise       Healthy diet/nutrition       Vision screening       Hearing screening       Osteoporosis prevention/bone health       Colorectal Cancer Screening        She reports that she has never smoked. She has never used smokeless tobacco.          Nely Goodson MD  [facility]

## 2023-01-10 LAB
BKR LAB AP GYN ADEQUACY: NORMAL
BKR LAB AP GYN INTERPRETATION: NORMAL
BKR LAB AP HPV REFLEX: NORMAL
BKR LAB AP PREVIOUS ABNORMAL: NORMAL
PATH REPORT.COMMENTS IMP SPEC: NORMAL
PATH REPORT.COMMENTS IMP SPEC: NORMAL
PATH REPORT.RELEVANT HX SPEC: NORMAL

## 2023-01-12 LAB
HUMAN PAPILLOMA VIRUS 16 DNA: NEGATIVE
HUMAN PAPILLOMA VIRUS 18 DNA: NEGATIVE
HUMAN PAPILLOMA VIRUS FINAL DIAGNOSIS: NORMAL
HUMAN PAPILLOMA VIRUS OTHER HR: NEGATIVE

## 2023-02-20 ENCOUNTER — TRANSFERRED RECORDS (OUTPATIENT)
Dept: HEALTH INFORMATION MANAGEMENT | Facility: CLINIC | Age: 63
End: 2023-02-20
Payer: COMMERCIAL

## 2023-03-03 ENCOUNTER — E-VISIT (OUTPATIENT)
Dept: URGENT CARE | Facility: CLINIC | Age: 63
End: 2023-03-03
Payer: COMMERCIAL

## 2023-03-03 DIAGNOSIS — J01.90 ACUTE SINUSITIS WITH SYMPTOMS > 10 DAYS: Primary | ICD-10-CM

## 2023-03-03 PROCEDURE — 99421 OL DIG E/M SVC 5-10 MIN: CPT | Performed by: PHYSICIAN ASSISTANT

## 2023-03-03 RX ORDER — DOXYCYCLINE HYCLATE 100 MG
100 TABLET ORAL 2 TIMES DAILY
Qty: 14 TABLET | Refills: 0 | Status: SHIPPED | OUTPATIENT
Start: 2023-03-03 | End: 2023-03-10

## 2023-03-03 NOTE — PATIENT INSTRUCTIONS
Sinusitis (Antibiotic Treatment)    The sinuses are air-filled spaces within the bones of the face. They connect to the inside of the nose. Sinusitis is an inflammation of the tissue that lines the sinuses. Sinusitis can occur during a cold. It can also happen due to allergies to pollens and other particles in the air. Sinusitis can cause symptoms of sinus congestion and a feeling of fullness. A sinus infection causes fever, headache, and facial pain. There is often green or yellow fluid draining from the nose or into the back of the throat (post-nasal drip). You have been given antibiotics to treat this condition.   Home care    Take the full course of antibiotics as instructed. Don't stop taking them, even when you feel better.    Drink plenty of water, hot tea, and other liquids as directed by the healthcare provider. This may help thin nasal mucus. It also may help your sinuses drain fluids.    Heat may help soothe painful areas of your face. Use a towel soaked in hot water. Or,  the shower and direct the warm spray onto your face. Using a vaporizer along with a menthol rub at night may also help soothe symptoms.     An expectorant with guaifenesin may help thin nasal mucus and help your sinuses drain fluids. Talk with your provider or pharmacists before taking an over-the-counter (OTC) medicine if you have any questions about it or its side effects..    You can use an OTC decongestant, unless a similar medicine was prescribed to you. Nasal sprays work the fastest. Use one that contains phenylephrine or oxymetazoline. First blow your nose gently. Then use the spray. Don't use these medicines more often than directed on the label. If you do, your symptoms may get worse. You may also take pills that contain pseudoephedrine. Don t use products that combine multiple medicines. This is because side effects may be increased. Read labels. You can also ask the pharmacist for help. (People with high blood  pressure should not use decongestants. They can raise blood pressure.) Talk with your provider or pharmacist if you have any questions about the medicine..    OTC antihistamines may help if allergies contributed to your sinusitis. Talk with your provider or pharmacist if you have any questions about the medicine..    Don't use nasal rinses or irrigation during an acute sinus infection, unless your healthcare provider tells you to. Rinsing may spread the infection to other areas in your sinuses.    Use acetaminophen or ibuprofen to control pain, unless another pain medicine was prescribed to you. If you have chronic liver or kidney disease or ever had a stomach ulcer, talk with your healthcare provider before using these medicines. Never give aspirin to anyone under age 18 who is ill with a fever. It may cause severe liver damage.    Don't smoke. This can make symptoms worse.    Follow-up care  Follow up with your healthcare provider, or as advised.   When to seek medical advice  Call your healthcare provider if any of these occur:     Facial pain or headache that gets worse    Stiff neck    Unusual drowsiness or confusion    Swelling of your forehead or eyelids    Symptoms don't go away in 10 days    Vision problems, such as blurred or double vision    Fever of 100.4 F (38 C) or higher, or as directed by your healthcare provider  Call 911  Call 911 if any of these occur:     Seizure    Trouble breathing    Feeling dizzy or faint    Fingernails, skin or lips look blue, purple , or gray  Prevention  Here are steps you can take to help prevent an infection:     Keep good hand washing habits.    Don t have close contact with people who have sore throats, colds, or other upper respiratory infections.    Don t smoke, and stay away from secondhand smoke.    Stay up to date with of your vaccines.  [employer] last reviewed this educational content on 12/1/2019 2000-2021 [employer]. All rights reserved. This  information is not intended as a substitute for professional medical care. Always follow your healthcare professional's instructions.        Dear Latesha Ruiz    After reviewing your responses, I've been able to diagnose you with a sinus infection  I have sent over a script for doxycycline for you to your pharmacy.  We do not use Zpak for sinusitis any longer due to increased antibiotic resistance concerns.     Based on your responses and diagnosis, I have prescribed No orders of the defined types were placed in this encounter.   to treat your symptoms. I have sent this to your pharmacy.?     It is also important to stay well hydrated, get lots of rest and take over-the-counter decongestants,?tylenol?or ibuprofen if you?are able to?take those medications per your primary care provider to help relieve discomfort.?     It is important that you take?all of?your prescribed medication even if your symptoms are improving after a few doses.? Taking?all of?your medicine helps prevent the symptoms from returning.?     If your symptoms worsen, you develop severe headache, vomiting, high fever (>102), or are not improving in 7 days, please contact your primary care provider for an appointment or visit any of our convenient Walk-in Care or Urgent Care Centers to be seen which can be found on our website?here.?     Thanks again for choosing?us?as your health care partner,?   ?  Davey Slater, FANNY, PAKelC?

## 2023-05-24 ENCOUNTER — HOSPITAL ENCOUNTER (OUTPATIENT)
Dept: MAMMOGRAPHY | Facility: CLINIC | Age: 63
Discharge: HOME | End: 2023-05-24
Attending: PEDIATRICS | Admitting: PEDIATRICS
Payer: COMMERCIAL

## 2023-05-24 DIAGNOSIS — Z12.31 VISIT FOR SCREENING MAMMOGRAM: ICD-10-CM

## 2023-05-24 PROCEDURE — 77067 SCR MAMMO BI INCL CAD: CPT

## 2023-07-29 ENCOUNTER — OFFICE VISIT (OUTPATIENT)
Dept: URGENT CARE | Facility: URGENT CARE | Age: 63
End: 2023-07-29
Payer: COMMERCIAL

## 2023-07-29 VITALS
BODY MASS INDEX: 22.49 KG/M2 | OXYGEN SATURATION: 100 % | HEART RATE: 78 BPM | SYSTOLIC BLOOD PRESSURE: 114 MMHG | WEIGHT: 152.3 LBS | DIASTOLIC BLOOD PRESSURE: 72 MMHG | TEMPERATURE: 98.2 F

## 2023-07-29 DIAGNOSIS — J02.9 SORE THROAT: ICD-10-CM

## 2023-07-29 DIAGNOSIS — H92.02 LEFT EAR PAIN: Primary | ICD-10-CM

## 2023-07-29 LAB — DEPRECATED S PYO AG THROAT QL EIA: NEGATIVE

## 2023-07-29 PROCEDURE — 99213 OFFICE O/P EST LOW 20 MIN: CPT | Performed by: PHYSICIAN ASSISTANT

## 2023-07-29 PROCEDURE — 87651 STREP A DNA AMP PROBE: CPT | Performed by: PHYSICIAN ASSISTANT

## 2023-07-29 RX ORDER — CHOLECALCIFEROL (VITAMIN D3) 125 MCG
CAPSULE ORAL
COMMUNITY
Start: 2022-08-24

## 2023-07-29 RX ORDER — COVID-19 MOLECULAR TEST ASSAY
KIT MISCELLANEOUS
COMMUNITY
Start: 2023-04-08 | End: 2024-02-13

## 2023-07-30 LAB — GROUP A STREP BY PCR: NOT DETECTED

## 2023-07-30 NOTE — PROGRESS NOTES
ASSESSMENT/PLAN:    (H92.02) Left ear pain  (primary encounter diagnosis)  MDM: Left ear pain x1 day of uncertain etiology.  We discussed possible mild eustachian tube congestion could come and go--as one possible explanation for her symptoms.  No evidence of otitis externa or otitis media on exam.  Plan: Streptococcus A Rapid Screen w/Reflex to PCR -         Clinic Collect, Group A Streptococcus PCR         Throat Swab          -Home comfort care measures and observant management is advised  -Patient is advised to follow-up if her symptoms fail to resolve in the next 1 week, or sooner if there is any worsening of current symptoms or development of new illness symptoms  -Educational handouts placed in MyChart    (J02.9) Sore throat  MDM: Strep negative. No evidence of secondary bacterial infection. Plan as per above.   Plan: Streptococcus A Rapid Screen w/Reflex to PCR -         Clinic Collect, Group A Streptococcus PCR         Throat Swab       This progress note has been dictated, with use of voice recognition software. Any grammatical, typographical, or context errors are unintentional and inherent to use of voice recognition software.    --------------------------      SUBJECTIVE:    Chief Complaint   Patient presents with     Otalgia     She came for ear pain that start this morning on the left ear           Latesha Ruiz  presents to  today for evaluation of acute onset left ear pain and mild sore throat that began this morning.        Past Medical History:   Diagnosis Date     AK (actinic keratosis) 6/17/2013     Allergic rhinitis, cause unspecified      Arthritis 2013?    mild in thumbs, jaw     Closed fracture of metacarpal bone(s), site unspecified 12/04    Right 5th metacarpal fx--caught in a door     Closed fracture of rib(s), unspecified ~1995     Contact dermatitis and other eczema, due to unspecified cause      Disorder of bone and cartilage, unspecified ~4/2000    T-score -2.00 in L-spine in  5/03     Lumbago      Premature menopause     Elevated FSH/LH in 1993--normal brain MRI 12/2001     Current Outpatient Medications   Medication     BINAXNOW COVID-19 AG HOME TEST KIT     lactase (LACTAID) 3000 UNIT tablet     pantoprazole (PROTONIX) 40 MG EC tablet     No current facility-administered medications for this visit.     Allergies   Allergen Reactions     Asa [Aspirin]      Erythromycin Nausea     Morphine And Related Nausea and Vomiting     Penicillins Hives           ROS: positive as per above. No associated fever, chills, cough, shortness of breath, wheezing, abdominal pain, nausea, vomiting, diarrhea, body aches, headaches, rashes, joint swelling or other acute illness symptoms.     OBJECTIVE:  /72   Pulse 78   Temp 98.2  F (36.8  C) (Tympanic)   Wt 69.1 kg (152 lb 4.8 oz)   SpO2 100%   BMI 22.49 kg/m        General appearance: alert and no apparent distress  Skin color is uniform in color and without rash.  HEENT:   Conjunctiva without infection.  Sclera clear.  Left TM is normal: no effusions, no erythema, and normal landmarks.  Right TM is normal: no effusions, no erythema, and normal landmarks.  Nasal mucosa is normal.  Neck is supple, FROM with no adenopathy  Oropharyngeal exam is normal other than evidence of post-nasal drip: no lesions, erythema, adenopathy or exudate.  CARDIAC:NORMAL - regular rate and rhythm without murmur.  RESP: Normal - CTA without rales, rhonchi, or wheezing.  NEURO: Alert and oriented.  Normal speech and mentation.  CN II/XII grossly intact.  Gait within normal limits.      Results for orders placed or performed in visit on 07/29/23   Streptococcus A Rapid Screen w/Reflex to PCR - Clinic Collect     Status: Normal    Specimen: Throat; Swab   Result Value Ref Range    Group A Strep antigen Negative Negative   Group A Streptococcus PCR Throat Swab     Status: Normal    Specimen: Throat; Swab   Result Value Ref Range    Group A strep by PCR Not Detected Not  Detected    Narrative    The Xpert Xpress Strep A test, performed on the [employer]  Instrument Systems, is a rapid, qualitative in vitro diagnostic test for the detection of Streptococcus pyogenes (Group A ß-hemolytic Streptococcus, Strep A) in throat swab specimens from patients with signs and symptoms of pharyngitis. The Xpert Xpress Strep A test can be used as an aid in the diagnosis of Group A Streptococcal pharyngitis. The assay is not intended to monitor treatment for Group A Streptococcus infections. The Xpert Xpress Strep A test utilizes an automated real-time polymerase chain reaction (PCR) to detect Streptococcus pyogenes DNA.

## 2023-08-15 ENCOUNTER — OFFICE VISIT (OUTPATIENT)
Dept: DERMATOLOGY | Facility: CLINIC | Age: 63
End: 2023-08-15
Payer: COMMERCIAL

## 2023-08-15 DIAGNOSIS — D22.9 MULTIPLE NEVI: ICD-10-CM

## 2023-08-15 DIAGNOSIS — B07.9 VERRUCA VULGARIS: Primary | ICD-10-CM

## 2023-08-15 DIAGNOSIS — L73.8 SENILE SEBACEOUS GLAND HYPERPLASIA: ICD-10-CM

## 2023-08-15 PROCEDURE — 99213 OFFICE O/P EST LOW 20 MIN: CPT | Performed by: DERMATOLOGY

## 2023-08-15 RX ORDER — FLUTICASONE PROPIONATE 50 MCG
1 SPRAY, SUSPENSION (ML) NASAL DAILY
COMMUNITY

## 2023-08-15 NOTE — PATIENT INSTRUCTIONS
Pediatric Dermatology  [facility]  [address]  1st [place]  [address]  Phone: [phone]    Pediatric & Adult Dermatology  [facility]  [address] [facility]   2nd Floor  [facility] [address]  [phone]                  General information: Dr. Maday Way is a board-certified dermatologist with subspecialty certification in pediatric dermatology.     Scheduling and Nurse Triage: Dr. Way sees pediatric patients on Mondays in [place] and adult and pediatric patients on Tuesdays in [place]. The remainder of the week she practices at the [facility]. Please call the above phone numbers to schedule or to talk to a nurse.     -For scheduling at the [place] or [place] locations, or to talk to the triage nurse please call the above phone number at the clinic where you were seen.     -For medication refills, please call your pharmacy.

## 2023-08-15 NOTE — LETTER
8/15/2023      RE: Latesha Ruiz  [address]  [facility] [id number]     Dear Colleague,    Thank you for the opportunity to participate in the care of your patient, Latesha Ruiz, at the [facility] at [facility]. Please see a copy of my visit note below.    Dermatology Clinic Note    Dermatology Problem List:  1. Actinic keratoses  2. Verruca   3. Benign pigmented nevi   4. Melasma  5. Seborrheic keratoses  6. Lipoma L forearm    Assessment and Plan:  1. Benign pigmented nevi: No lesions of concern. Sun protection recommended. Discussed ABCDEs of malignant melanoma.      2. Seborrheic keratoses: Benign hyperkeratotic papules and plaques. No treatment advised unless irritation occurs.      3. Verruca on the L 3rd finger treated previously with liquid nitrogen. No additional treatment today.     4. Sebaceous hyperplasia and milia. Benign skin papules on the face.     RTC 1 year, sooner prn.       Thank you for involving me in this patient's care.     Maday Way MD   of Dermatology  [facility]      CC: Referred MD Suresh  No address on file    Maday Anaya MD    ____________________________________________________________________________________________________________________________________________    CC: Patient presents with:  RECHECK: Annual Skin check current concerns are her neck line     HPI: Latesha Ruiz is a 63 year old female presenting for yearly skin check. She had a small area of sunburn and then developed blue colored spots along the neck. This has subsequently resolved.       Patient Active Problem List   Diagnosis    Allergic rhinitis    Osteoporosis    CARDIOVASCULAR SCREENING; LDL GOAL LESS THAN 160    Lactose intolerance    Stress incontinence    Sebaceous hyperplasia of face    AK (actinic keratosis)    Vitamin D deficiency       Allergies   Allergen Reactions     Asa [Aspirin]     Erythromycin Nausea    Morphine And Related Nausea and Vomiting    Penicillins Hives         Current Outpatient Medications   Medication    fluticasone (FLONASE) 50 MCG/ACT nasal spray    BINAXNOW COVID-19 AG HOME TEST KIT    lactase (LACTAID) 3000 UNIT tablet    pantoprazole (PROTONIX) 40 MG EC tablet     No current facility-administered medications for this visit.         Social History     Tobacco Use    Smoking status: Never    Smokeless tobacco: Never   Vaping Use    Vaping Use: Never used   Substance Use Topics    Alcohol use: No    Drug use: No       EXAM:  There were no vitals taken for this visit.  GEN: Alert, no distress  SKIN: Full body excluding genitals  --Tan and gray 3-5 mm waxy papules on the upper chest, extensor upper arms, back, thighs, shins  --Cherry red papules on the chest, abdomen, back  --Chest with waxy tan papules and hypopigmented papules  --Few medium brown macules on the back, chest  --Light brown patches on the lateral forehead and lateral cheeks  --White 1 mm papules on the mid cheeks  --Yellow pink papules on the forehead  --verrucous papule 2 mm on the L 3rd palmar finger  --Palmar L finger with laceration      Please do not hesitate to contact me if you have any questions/concerns.     Sincerely,       Maday Way MD

## 2023-08-15 NOTE — PROGRESS NOTES
Dermatology Clinic Note    Dermatology Problem List:  1. Actinic keratoses  2. Verruca   3. Benign pigmented nevi   4. Melasma  5. Seborrheic keratoses  6. Lipoma L forearm    Assessment and Plan:  1. Benign pigmented nevi: No lesions of concern. Sun protection recommended. Discussed ABCDEs of malignant melanoma.      2. Seborrheic keratoses: Benign hyperkeratotic papules and plaques. No treatment advised unless irritation occurs.      3. Verruca on the L 3rd finger treated previously with liquid nitrogen. No additional treatment today.     4. Sebaceous hyperplasia and milia. Benign skin papules on the face.     RTC 1 year, sooner prn.       Thank you for involving me in this patient's care.     Maday Way MD   of Dermatology  [facility]      CC: Referred Self, MD  No address on file    Maday Anaya MD    ____________________________________________________________________________________________________________________________________________    CC: Patient presents with:  RECHECK: Annual Skin check current concerns are her neck line     HPI: Latesha Ruiz is a 63 year old female presenting for yearly skin check. She had a small area of sunburn and then developed blue colored spots along the neck. This has subsequently resolved.       Patient Active Problem List   Diagnosis    Allergic rhinitis    Osteoporosis    CARDIOVASCULAR SCREENING; LDL GOAL LESS THAN 160    Lactose intolerance    Stress incontinence    Sebaceous hyperplasia of face    AK (actinic keratosis)    Vitamin D deficiency       Allergies   Allergen Reactions    Asa [Aspirin]     Erythromycin Nausea    Morphine And Related Nausea and Vomiting    Penicillins Hives         Current Outpatient Medications   Medication    fluticasone (FLONASE) 50 MCG/ACT nasal spray    BINAXNOW COVID-19 AG HOME TEST KIT    lactase (LACTAID) 3000 UNIT tablet    pantoprazole (PROTONIX) 40 MG EC tablet     No current  facility-administered medications for this visit.         Social History     Tobacco Use    Smoking status: Never    Smokeless tobacco: Never   Vaping Use    Vaping Use: Never used   Substance Use Topics    Alcohol use: No    Drug use: No       EXAM:  There were no vitals taken for this visit.  GEN: Alert, no distress  SKIN: Full body excluding genitals  --Tan and gray 3-5 mm waxy papules on the upper chest, extensor upper arms, back, thighs, shins  --Cherry red papules on the chest, abdomen, back  --Chest with waxy tan papules and hypopigmented papules  --Few medium brown macules on the back, chest  --Light brown patches on the lateral forehead and lateral cheeks  --White 1 mm papules on the mid cheeks  --Yellow pink papules on the forehead  --verrucous papule 2 mm on the L 3rd palmar finger  --Palmar L finger with laceration

## 2023-09-06 ENCOUNTER — OFFICE VISIT (OUTPATIENT)
Dept: PEDIATRICS | Facility: CLINIC | Age: 63
End: 2023-09-06
Payer: COMMERCIAL

## 2023-09-06 VITALS
WEIGHT: 152.3 LBS | RESPIRATION RATE: 18 BRPM | HEIGHT: 69 IN | DIASTOLIC BLOOD PRESSURE: 70 MMHG | BODY MASS INDEX: 22.56 KG/M2 | TEMPERATURE: 97.9 F | HEART RATE: 66 BPM | SYSTOLIC BLOOD PRESSURE: 118 MMHG | OXYGEN SATURATION: 98 %

## 2023-09-06 DIAGNOSIS — J30.89 SEASONAL ALLERGIC RHINITIS DUE TO OTHER ALLERGIC TRIGGER: ICD-10-CM

## 2023-09-06 DIAGNOSIS — J34.89 SINUS PRESSURE: Primary | ICD-10-CM

## 2023-09-06 PROCEDURE — 99214 OFFICE O/P EST MOD 30 MIN: CPT | Performed by: INTERNAL MEDICINE

## 2023-09-06 RX ORDER — DOXYCYCLINE HYCLATE 100 MG
100 TABLET ORAL 2 TIMES DAILY
Qty: 14 TABLET | Refills: 0 | Status: CANCELLED | OUTPATIENT
Start: 2023-09-06

## 2023-09-06 ASSESSMENT — PAIN SCALES - GENERAL: PAINLEVEL: NO PAIN (0)

## 2023-09-06 NOTE — PROGRESS NOTES
Assessment & Plan     Sinus pressure/allergic rhinitis   Continued occasional unilateral facial pressure and occasional dizziness, partially relieved by Flonase, no red flag symptoms: Most likely allergic rhinitis, potentially causing some vestibular dysfunction, less likely sinus infection or Eustachian tube dysfunction. Discussed continuing to use Flonase, symptoms may resolve on their own as the weather changes. Order CT sinus to evaluate for sinus inflammation, rule out physical abnormalities (cyst) that may be contributing to symptoms. Consider ENT referral if CT is unremarkable and symptoms continue despite Flonase use and weather change.  - CT Sinus w/o Contrast; Future    Review of prior external note(s) from [facility]        Juani Hines, MS3  [employer]      Physician Attestation   I, Neo Don MD, was present with the medical/CARLO student who participated in the service and in the documentation of the note.  I have verified the history and personally performed the physical exam and medical decision making.  I agree with the assessment and plan of care as documented in the note.      Items personally reviewed: vitals and labs.      patient with intermittent symptoms, better with flonase but persistent, sinus ct to evaluation for polyp or other that could be contributing, exam within normal limits, no fullness, erythema, or swelling, no headaches, no pain with percussion of sinuses. no temporal pain or jaw claudication. no vision changes or any other symptoms. consider ent evaluation if persists and pending sinus ct results. Also d/w pt signs/symptoms of worsening of condition and need for urgent ED evaluation. Patient verbalized understanding and is agreeable to this plan.     Neo Don M.D.  Internal Medicine-Pediatrics        Dae Charlton is a 63 year old, presenting for the following health issues:  Sinus Problem (F/U) and Ear  "Problem      9/6/2023     1:14 PM   Additional Questions   Roomed by Seb Mares   Accompanied by N/A         9/6/2023     1:14 PM   Patient Reported Additional Medications   Patient reports taking the following new medications No       HPI  Presented to urgent care at the end of July 2023 for left ear pain, sore throat, swollen submandibular lymph nodes. Determined to have mild otitis media, likely viral, prescribed Flonase. Continues to have unilateral sensation of pressure on the left side of her face, near her L ear. Denies pain, describes discomfort/noticeable pressure. She has allergies to dust, pollens. Doesn't take any allergy medications (tried Claritin last month and didn't like how it made her feel). Flonase provides some relief.  Wondering if previous dental surgery (2020) might be the cause. Also notes occasional loss of balance, often upon waking in the morning, but no associated falls. No coughing, sputum production, fever, chills, shortness of breath, jaw pain, tooth pain.    History of Present Illness       Reason for visit:  Sinus, ear    She eats 2-3 servings of fruits and vegetables daily.She consumes 0 sweetened beverage(s) daily.She exercises with enough effort to increase her heart rate 20 to 29 minutes per day.  She exercises with enough effort to increase her heart rate 4 days per week.   She is taking medications regularly.               Review of Systems   HENT:  Positive for ear pain.       Constitutional, HEENT, cardiovascular, pulmonary, gi and gu systems are negative, except as otherwise noted.      Objective    /70 (BP Location: Right arm, Patient Position: Sitting, Cuff Size: Adult Regular)   Pulse 66   Temp 97.9  F (36.6  C) (Tympanic)   Resp 18   Ht 1.743 m (5' 8.62\")   Wt 69.1 kg (152 lb 4.8 oz)   SpO2 98%   BMI 22.74 kg/m    Body mass index is 22.74 kg/m .  Physical Exam  HENT:      Head: Normocephalic and atraumatic.      Right Ear: Tympanic membrane, ear canal " and external ear normal.      Left Ear: Tympanic membrane, ear canal and external ear normal.      Mouth/Throat:      Mouth: Mucous membranes are moist.      Pharynx: Oropharynx is clear.   Eyes:      Extraocular Movements: Extraocular movements intact.      Conjunctiva/sclera: Conjunctivae normal.      Pupils: Pupils are equal, round, and reactive to light.     GENERAL: healthy, alert and no distress  NECK: no adenopathy, no asymmetry, masses, or scars and thyroid normal to palpation  RESP: lungs clear to auscultation - no rales, rhonchi or wheezes  CV: regular rate and rhythm, normal S1 S2, no S3 or S4, no murmur, click or rub, no peripheral edema and peripheral pulses strong  MS: no gross musculoskeletal defects noted, no edema

## 2023-09-21 ENCOUNTER — HOSPITAL ENCOUNTER (OUTPATIENT)
Dept: CT IMAGING | Facility: CLINIC | Age: 63
Discharge: HOME | End: 2023-09-21
Attending: INTERNAL MEDICINE | Admitting: INTERNAL MEDICINE
Payer: COMMERCIAL

## 2023-09-21 DIAGNOSIS — J34.89 SINUS PRESSURE: ICD-10-CM

## 2023-09-21 PROCEDURE — 70486 CT MAXILLOFACIAL W/O DYE: CPT

## 2023-10-17 NOTE — TELEPHONE ENCOUNTER
RECORDS RECEIVED FROM: Internal    REASON FOR VISIT: Meningioma  Sinus pressure    Date of Appt: 11/15/23 @ 9:30 am    NOTES (FOR ALL VISITS) STATUS DETAILS   OFFICE NOTE from referring provider Internal 9/6/23 Neo Don MD @[facility]     MEDICATION LIST Internal    IMAGING  (FOR ALL VISITS)     CT (HEAD, NECK, SPINE) Internal [facility]  9/21/23 CT Sinus

## 2023-11-15 ENCOUNTER — PRE VISIT (OUTPATIENT)
Dept: NEUROSURGERY | Facility: CLINIC | Age: 63
End: 2023-11-15

## 2023-11-15 ENCOUNTER — OFFICE VISIT (OUTPATIENT)
Dept: NEUROSURGERY | Facility: CLINIC | Age: 63
End: 2023-11-15
Payer: COMMERCIAL

## 2023-11-15 VITALS
HEIGHT: 68 IN | OXYGEN SATURATION: 99 % | HEART RATE: 65 BPM | SYSTOLIC BLOOD PRESSURE: 112 MMHG | DIASTOLIC BLOOD PRESSURE: 67 MMHG | WEIGHT: 152 LBS | BODY MASS INDEX: 23.04 KG/M2

## 2023-11-15 DIAGNOSIS — D32.9 MENINGIOMA (H): ICD-10-CM

## 2023-11-15 DIAGNOSIS — J34.89 SINUS PRESSURE: ICD-10-CM

## 2023-11-15 PROCEDURE — 99204 OFFICE O/P NEW MOD 45 MIN: CPT | Performed by: PHYSICIAN ASSISTANT

## 2023-11-15 ASSESSMENT — PAIN SCALES - GENERAL: PAINLEVEL: NO PAIN (0)

## 2023-11-15 NOTE — NURSING NOTE
"Chief Complaint   Patient presents with    Consult     Test result CT   /67   Pulse 65   Ht 1.727 m (5' 8\")   Wt 68.9 kg (152 lb)   SpO2 99%   BMI 23.11 kg/m      Magy Santos CMA at 9:29 AM on 11/15/2023.        "

## 2023-11-15 NOTE — PATIENT INSTRUCTIONS
Follow up in 3-6 months (from September) with MRI prior to appointment. Virtual or in person.   Please reach out sooner with new concerns.

## 2023-11-15 NOTE — PROGRESS NOTES
[facility]  Department of Neurosurgery  Center for Skull Base and Pituitary Surgery    Name: Latesha Ruiz  MRN: [id number]  Age: 63 year old  : 1960  Referring provider: Neo Don  11/15/2023      Chief Complaint:   Left frontal convexity meningioma, new patient consult    History of Present Illness:   Latesha Ruiz is a 63 year old female who is here today as a new patient regarding findings of what appears to be a left frontal convexity meningioma. This was seen recently (2023) on a CT of her sinuses, done for ongoing pressure sensation in her left cheek. She reports intermittent tingling sensation across the left cheek to the upper lip. There has not been associated pain but she sometimes feels the sensation that there is pressure. She met with Primary Care who ordered a sinus CT. This revealed a small, roughly 1 cm lesion in the left frontal lobe for which she was referred here. Today she reports ongoing left cheek symptoms which come and go. She has no history of seizure. No family history of brain tumors. Her oldest son has epilepsy.     She is a retired . She is , 3 grown children all boys.     Review of Systems:   Pertinent items are noted in HPI or as in patient entered ROS below, remainder of complete ROS is negative.     Active Medications:     Current Outpatient Medications:     lactase (LACTAID) 3000 UNIT tablet, , Disp: , Rfl:     BINAXNOW COVID-19 AG HOME TEST KIT, , Disp: , Rfl:     fluticasone (FLONASE) 50 MCG/ACT nasal spray, Spray 1 spray into both nostrils daily (Patient not taking: Reported on 11/15/2023), Disp: , Rfl:     pantoprazole (PROTONIX) 40 MG EC tablet, Take 40 mg by mouth 2 times daily (Patient not taking: Reported on 8/15/2023), Disp: , Rfl:       Allergies:   Asa [aspirin], Erythromycin, Morphine and related, and Penicillins      Past Medical History:  Past Medical History:   Diagnosis Date    AK (actinic keratosis)  2013    Allergic rhinitis, cause unspecified     Arthritis ?    mild in thumbs, jaw    Closed fracture of metacarpal bone(s), site unspecified     Right 5th metacarpal fx--caught in a door    Closed fracture of rib(s), unspecified ~    Contact dermatitis and other eczema, due to unspecified cause     Disorder of bone and cartilage, unspecified ~2000    T-score -2.00 in L-spine in     Lumbago     Premature menopause     Elevated FSH/LH in --normal brain MRI 2001     Patient Active Problem List   Diagnosis    Allergic rhinitis    Osteoporosis    CARDIOVASCULAR SCREENING; LDL GOAL LESS THAN 160    Lactose intolerance    Stress incontinence    Sebaceous hyperplasia of face    AK (actinic keratosis)    Vitamin D deficiency        Past Surgical History:  Past Surgical History:   Procedure Laterality Date    ABDOMEN SURGERY  , ,     3 C-sections    BIOPSY  , ?    breast lumps - benign    BREAST SURGERY  ,     benign lumps    COLONOSCOPY  ,     ZZC APPENDECTOMY  1971    ZZC  DELIVERY ONLY      Primary C/S - FTP    ZZC  DELIVERY ONLY      Repeat C/S    ZZC  DELIVERY ONLY      Repeat C/S, TL    ZZC NONSPECIFIC PROCEDURE  1989    Breast Bx - Right       Family History:   Family History   Problem Relation Age of Onset    Psychotic Disorder Mother         anxiety    Thyroid Disease Mother         Born [birth year]. Has CAD, two stents placed.     C.A.D. Mother         Had MI/stents x 3 at age 84.    Cancer Mother         BCC of face    Coronary Artery Disease Mother         at age 84 - stents    Other Cancer Mother         lung cancer - age 92 non-smoker    Family History Negative Father         Born [birth year]. Resides in SNF. Has spinal stenosis, difficulty ambulating, also short term memory loss.     Prostate Cancer Father         age 65 or so    Family History Negative Sister         Brief period of anxiety, born 1953    Thyroid Disease  "Sister         Non cancerous growth on thyroid removed    Thyroid Disease Sister     Breast Cancer Maternal Grandmother     Family History Negative Maternal Grandfather          age 74, pulmonary embolism. Had colitis    Cancer Paternal Grandfather         ?stomach CA (was a digestive organ)    Psychotic Disorder Brother         anxiety/Born 1949    Depression Brother     Gastrointestinal Disease Son         gastroesophageal reflux    Neurologic Disorder Son         seizure disorder    Colon Polyps Son     Breast Cancer Paternal Aunt     Respiratory Child         mild asthma/mild asthma    Breast Cancer Other          Social History:   Social History     Tobacco Use    Smoking status: Never    Smokeless tobacco: Never   Vaping Use    Vaping Use: Never used   Substance Use Topics    Alcohol use: No    Drug use: No        Physical Exam:   /67   Pulse 65   Ht 1.727 m (5' 8\")   Wt 68.9 kg (152 lb)   SpO2 99%   BMI 23.11 kg/m     General: No acute distress.   Eyes: Conjunctivae are normal.  MSK: Moves all extremities.  No obvious deformity.  Neuro: The patient is fully oriented. Speech is normal. Facial sensation is intact in V1, V2, V3 distributions; very mild decreased sensation in the left cheek. Facial nerve function is normal, rated as a House Brackmann 1. Shoulders are full strength. There is no pronator drift. Full strength in bilateral . No dysmetria with finger-nose-finger testing. Gait is normal  Psych: Normal mood and affect. Behavior is normal.      Imaging:  We reviewed the CT from September this year which shows a partially calcified left frontal convexity lesion 1cm, consistent with a meningioma  Patient does not think she's had brain imaging done in the past.      Assessment:  Left frontal convexity meningioma, new patient consult    Plan:  We discussed and reviewed her imaging findings and the natural history of meningiomas including grading and options for management. We'd like to get " a better look at this lesion and will do so with a brain MRI w/wo contrast in 3-6 months to follow up and assess for growth. She was encouraged to contact us with any additional concerns or questions prior to our next appointment.      Chloe Knight PA-C  Department of Neurosurgery  Center for Skull Base and Pituitary Surgery  [facility]

## 2023-11-15 NOTE — LETTER
11/15/2023       RE: Latesha Ruiz  [address]  [place] [facility] [address]     Dear Colleague,    Thank you for referring your patient, Latesha Ruiz, to the [facility]UROSURG[facility] [place] at [facility]. Please see a copy of my visit note below.      [facility]  Department of Neurosurgery  Center for Skull Base and Pituitary Surgery    Name: Latesha Ruiz  MRN: [id number]  Age: 63 year old  : 1960  Referring provider: Neo Don  11/15/2023      Chief Complaint:   Left frontal convexity meningioma, new patient consult    History of Present Illness:   Latesha Ruiz is a 63 year old female who is here today as a new patient regarding findings of what appears to be a left frontal convexity meningioma. This was seen recently (2023) on a CT of her sinuses, done for ongoing pressure sensation in her left cheek. She reports intermittent tingling sensation across the left cheek to the upper lip. There has not been associated pain but she sometimes feels the sensation that there is pressure. She met with Primary Care who ordered a sinus CT. This revealed a small, roughly 1 cm lesion in the left frontal lobe for which she was referred here. Today she reports ongoing left cheek symptoms which come and go. She has no history of seizure. No family history of brain tumors. Her oldest son has epilepsy.     She is a retired . She is , 3 grown children all boys.     Review of Systems:   Pertinent items are noted in HPI or as in patient entered ROS below, remainder of complete ROS is negative.     Active Medications:     Current Outpatient Medications:     lactase (LACTAID) 3000 UNIT tablet, , Disp: , Rfl:     BINAXNOW COVID-19 AG HOME TEST KIT, , Disp: , Rfl:     fluticasone (FLONASE) 50 MCG/ACT nasal spray, Spray 1 spray into both nostrils daily (Patient not taking: Reported on 11/15/2023), Disp: ,  Rfl:     pantoprazole (PROTONIX) 40 MG EC tablet, Take 40 mg by mouth 2 times daily (Patient not taking: Reported on 8/15/2023), Disp: , Rfl:       Allergies:   Asa [aspirin], Erythromycin, Morphine and related, and Penicillins      Past Medical History:  Past Medical History:   Diagnosis Date    AK (actinic keratosis) 2013    Allergic rhinitis, cause unspecified     Arthritis ?    mild in thumbs, jaw    Closed fracture of metacarpal bone(s), site unspecified     Right 5th metacarpal fx--caught in a door    Closed fracture of rib(s), unspecified ~    Contact dermatitis and other eczema, due to unspecified cause     Disorder of bone and cartilage, unspecified ~2000    T-score -2.00 in L-spine in     Lumbago     Premature menopause     Elevated FSH/LH in --normal brain MRI 2001     Patient Active Problem List   Diagnosis    Allergic rhinitis    Osteoporosis    CARDIOVASCULAR SCREENING; LDL GOAL LESS THAN 160    Lactose intolerance    Stress incontinence    Sebaceous hyperplasia of face    AK (actinic keratosis)    Vitamin D deficiency        Past Surgical History:  Past Surgical History:   Procedure Laterality Date    ABDOMEN SURGERY  , ,     3 C-sections    BIOPSY  , ?    breast lumps - benign    BREAST SURGERY  ,     benign lumps    COLONOSCOPY  ,     ZZC APPENDECTOMY  1971    ZZC  DELIVERY ONLY      Primary C/S - FTP    ZZC  DELIVERY ONLY      Repeat C/S    ZZC  DELIVERY ONLY      Repeat C/S, TL    ZZC NONSPECIFIC PROCEDURE  1989    Breast Bx - Right       Family History:   Family History   Problem Relation Age of Onset    Psychotic Disorder Mother         anxiety    Thyroid Disease Mother         Born 1921. Has CAD, two stents placed.     C.A.D. Mother         Had MI/stents x 3 at age 84.    Cancer Mother         BCC of face    Coronary Artery Disease Mother         at age 84 - stents    Other Cancer Mother          "lung cancer - age 92 non-smoker    Family History Negative Father         Born [birth year]. Resides in SNF. Has spinal stenosis, difficulty ambulating, also short term memory loss.     Prostate Cancer Father         age 65 or so    Family History Negative Sister         Brief period of anxiety, born 1953    Thyroid Disease Sister         Non cancerous growth on thyroid removed    Thyroid Disease Sister     Breast Cancer Maternal Grandmother     Family History Negative Maternal Grandfather          age 74, pulmonary embolism. Had colitis    Cancer Paternal Grandfather         ?stomach CA (was a digestive organ)    Psychotic Disorder Brother         anxiety/Born 194    Depression Brother     Gastrointestinal Disease Son         gastroesophageal reflux    Neurologic Disorder Son         seizure disorder    Colon Polyps Son     Breast Cancer Paternal Aunt     Respiratory Child         mild asthma/mild asthma    Breast Cancer Other       Social History:   Social History     Tobacco Use    Smoking status: Never    Smokeless tobacco: Never   Vaping Use    Vaping Use: Never used   Substance Use Topics    Alcohol use: No    Drug use: No     Physical Exam:   /67   Pulse 65   Ht 1.727 m (5' 8\")   Wt 68.9 kg (152 lb)   SpO2 99%   BMI 23.11 kg/m     General: No acute distress.   Eyes: Conjunctivae are normal.  MSK: Moves all extremities.  No obvious deformity.  Neuro: The patient is fully oriented. Speech is normal. Facial sensation is intact in V1, V2, V3 distributions; very mild decreased sensation in the left cheek. Facial nerve function is normal, rated as a House Brackmann 1. Shoulders are full strength. There is no pronator drift. Full strength in bilateral . No dysmetria with finger-nose-finger testing. Gait is normal  Psych: Normal mood and affect. Behavior is normal.      Imaging:  We reviewed the CT from September this year which shows a partially calcified left frontal convexity lesion 1cm, consistent " with a meningioma  Patient does not think she's had brain imaging done in the past.      Assessment:  Left frontal convexity meningioma, new patient consult    Plan:  We discussed and reviewed her imaging findings and the natural history of meningiomas including grading and options for management. We'd like to get a better look at this lesion and will do so with a brain MRI w/wo contrast in 3-6 months to follow up and assess for growth. She was encouraged to contact us with any additional concerns or questions prior to our next appointment.    Again, thank you for allowing me to participate in the care of your patient.      Sincerely,    Chloe Knight PA-C

## 2023-12-06 ENCOUNTER — PATIENT OUTREACH (OUTPATIENT)
Dept: CARE COORDINATION | Facility: CLINIC | Age: 63
End: 2023-12-06
Payer: COMMERCIAL

## 2023-12-20 ENCOUNTER — PATIENT OUTREACH (OUTPATIENT)
Dept: CARE COORDINATION | Facility: CLINIC | Age: 63
End: 2023-12-20
Payer: COMMERCIAL

## 2024-01-31 DIAGNOSIS — M81.0 OSTEOPOROSIS, UNSPECIFIED OSTEOPOROSIS TYPE, UNSPECIFIED PATHOLOGICAL FRACTURE PRESENCE: ICD-10-CM

## 2024-01-31 DIAGNOSIS — Z13.6 CARDIOVASCULAR SCREENING; LDL GOAL LESS THAN 160: ICD-10-CM

## 2024-01-31 DIAGNOSIS — R79.89 ELEVATED LIVER FUNCTION TESTS: ICD-10-CM

## 2024-01-31 DIAGNOSIS — R74.01 ELEVATED ALT MEASUREMENT: Primary | ICD-10-CM

## 2024-01-31 DIAGNOSIS — Z83.49 FAMILY HISTORY OF THYROID DISEASE: ICD-10-CM

## 2024-01-31 DIAGNOSIS — Z00.00 ROUTINE GENERAL MEDICAL EXAMINATION AT A HEALTH CARE FACILITY: ICD-10-CM

## 2024-01-31 DIAGNOSIS — E55.9 VITAMIN D DEFICIENCY: ICD-10-CM

## 2024-01-31 DIAGNOSIS — D32.9 MENINGIOMA (H): ICD-10-CM

## 2024-01-31 SDOH — HEALTH STABILITY: PHYSICAL HEALTH: ON AVERAGE, HOW MANY MINUTES DO YOU ENGAGE IN EXERCISE AT THIS LEVEL?: 30 MIN

## 2024-01-31 SDOH — HEALTH STABILITY: PHYSICAL HEALTH: ON AVERAGE, HOW MANY DAYS PER WEEK DO YOU ENGAGE IN MODERATE TO STRENUOUS EXERCISE (LIKE A BRISK WALK)?: 4 DAYS

## 2024-01-31 ASSESSMENT — SOCIAL DETERMINANTS OF HEALTH (SDOH): HOW OFTEN DO YOU GET TOGETHER WITH FRIENDS OR RELATIVES?: MORE THAN THREE TIMES A WEEK

## 2024-01-31 NOTE — PROGRESS NOTES
patient her with son, as office visit next week, would like labs done first,  orders in. she did sign waiver for TSH test in case not covered by insurance.

## 2024-02-02 ENCOUNTER — LAB (OUTPATIENT)
Dept: LAB | Facility: CLINIC | Age: 64
End: 2024-02-02
Payer: COMMERCIAL

## 2024-02-02 DIAGNOSIS — Z00.00 ROUTINE GENERAL MEDICAL EXAMINATION AT A HEALTH CARE FACILITY: ICD-10-CM

## 2024-02-02 DIAGNOSIS — R79.89 ELEVATED LIVER FUNCTION TESTS: ICD-10-CM

## 2024-02-02 DIAGNOSIS — R74.01 ELEVATED ALT MEASUREMENT: ICD-10-CM

## 2024-02-02 DIAGNOSIS — Z13.6 CARDIOVASCULAR SCREENING; LDL GOAL LESS THAN 160: ICD-10-CM

## 2024-02-02 DIAGNOSIS — Z83.49 FAMILY HISTORY OF THYROID DISEASE: ICD-10-CM

## 2024-02-02 DIAGNOSIS — D32.9 MENINGIOMA (H): ICD-10-CM

## 2024-02-02 DIAGNOSIS — E55.9 VITAMIN D DEFICIENCY: ICD-10-CM

## 2024-02-02 DIAGNOSIS — M81.0 OSTEOPOROSIS, UNSPECIFIED OSTEOPOROSIS TYPE, UNSPECIFIED PATHOLOGICAL FRACTURE PRESENCE: ICD-10-CM

## 2024-02-02 LAB
ALBUMIN SERPL BCG-MCNC: 4.5 G/DL (ref 3.5–5.2)
ALP SERPL-CCNC: 65 U/L (ref 40–150)
ALT SERPL W P-5'-P-CCNC: 45 U/L (ref 0–50)
ANION GAP SERPL CALCULATED.3IONS-SCNC: 8 MMOL/L (ref 7–15)
AST SERPL W P-5'-P-CCNC: 28 U/L (ref 0–45)
BASOPHILS # BLD AUTO: 0 10E3/UL (ref 0–0.2)
BASOPHILS NFR BLD AUTO: 1 %
BILIRUB SERPL-MCNC: 0.7 MG/DL
BUN SERPL-MCNC: 12.3 MG/DL (ref 8–23)
CALCIUM SERPL-MCNC: 8.9 MG/DL (ref 8.8–10.2)
CHLORIDE SERPL-SCNC: 105 MMOL/L (ref 98–107)
CHOLEST SERPL-MCNC: 235 MG/DL
CREAT SERPL-MCNC: 0.83 MG/DL (ref 0.51–0.95)
DEPRECATED HCO3 PLAS-SCNC: 28 MMOL/L (ref 22–29)
EGFRCR SERPLBLD CKD-EPI 2021: 78 ML/MIN/1.73M2
EOSINOPHIL # BLD AUTO: 0.2 10E3/UL (ref 0–0.7)
EOSINOPHIL NFR BLD AUTO: 3 %
ERYTHROCYTE [DISTWIDTH] IN BLOOD BY AUTOMATED COUNT: 12.4 % (ref 10–15)
FASTING STATUS PATIENT QL REPORTED: YES
GLUCOSE SERPL-MCNC: 91 MG/DL (ref 70–99)
HCT VFR BLD AUTO: 45.7 % (ref 35–47)
HDLC SERPL-MCNC: 56 MG/DL
HGB BLD-MCNC: 15.2 G/DL (ref 11.7–15.7)
IMM GRANULOCYTES # BLD: 0 10E3/UL
IMM GRANULOCYTES NFR BLD: 0 %
LDLC SERPL CALC-MCNC: 151 MG/DL
LYMPHOCYTES # BLD AUTO: 2.1 10E3/UL (ref 0.8–5.3)
LYMPHOCYTES NFR BLD AUTO: 35 %
MCH RBC QN AUTO: 28.8 PG (ref 26.5–33)
MCHC RBC AUTO-ENTMCNC: 33.3 G/DL (ref 31.5–36.5)
MCV RBC AUTO: 87 FL (ref 78–100)
MONOCYTES # BLD AUTO: 0.4 10E3/UL (ref 0–1.3)
MONOCYTES NFR BLD AUTO: 6 %
NEUTROPHILS # BLD AUTO: 3.3 10E3/UL (ref 1.6–8.3)
NEUTROPHILS NFR BLD AUTO: 55 %
NONHDLC SERPL-MCNC: 179 MG/DL
NRBC # BLD AUTO: 0 10E3/UL
NRBC BLD AUTO-RTO: 0 /100
PLATELET # BLD AUTO: 254 10E3/UL (ref 150–450)
POTASSIUM SERPL-SCNC: 4.1 MMOL/L (ref 3.4–5.3)
PROT SERPL-MCNC: 7.1 G/DL (ref 6.4–8.3)
RBC # BLD AUTO: 5.27 10E6/UL (ref 3.8–5.2)
SODIUM SERPL-SCNC: 141 MMOL/L (ref 135–145)
TRIGL SERPL-MCNC: 141 MG/DL
TSH SERPL DL<=0.005 MIU/L-ACNC: 1.07 UIU/ML (ref 0.3–4.2)
VIT D+METAB SERPL-MCNC: 18 NG/ML (ref 20–50)
WBC # BLD AUTO: 6 10E3/UL (ref 4–11)

## 2024-02-02 PROCEDURE — 82306 VITAMIN D 25 HYDROXY: CPT

## 2024-02-02 PROCEDURE — 85025 COMPLETE CBC W/AUTO DIFF WBC: CPT

## 2024-02-02 PROCEDURE — 84443 ASSAY THYROID STIM HORMONE: CPT | Mod: GA

## 2024-02-02 PROCEDURE — 80061 LIPID PANEL: CPT

## 2024-02-02 PROCEDURE — 80053 COMPREHEN METABOLIC PANEL: CPT

## 2024-02-02 PROCEDURE — 36415 COLL VENOUS BLD VENIPUNCTURE: CPT

## 2024-02-06 ENCOUNTER — HOSPITAL ENCOUNTER (OUTPATIENT)
Dept: MRI IMAGING | Facility: CLINIC | Age: 64
Discharge: HOME | End: 2024-02-06
Attending: PHYSICIAN ASSISTANT | Admitting: PHYSICIAN ASSISTANT
Payer: COMMERCIAL

## 2024-02-06 DIAGNOSIS — D32.9 MENINGIOMA (H): ICD-10-CM

## 2024-02-06 PROCEDURE — A9585 GADOBUTROL INJECTION: HCPCS | Performed by: PHYSICIAN ASSISTANT

## 2024-02-06 PROCEDURE — 70553 MRI BRAIN STEM W/O & W/DYE: CPT | Mod: MG

## 2024-02-06 PROCEDURE — 255N000002 HC RX 255 OP 636: Performed by: PHYSICIAN ASSISTANT

## 2024-02-06 RX ORDER — GADOBUTROL 604.72 MG/ML
7 INJECTION INTRAVENOUS ONCE
Status: COMPLETED | OUTPATIENT
Start: 2024-02-06 | End: 2024-02-06

## 2024-02-06 RX ADMIN — GADOBUTROL 7 ML: 604.72 INJECTION INTRAVENOUS at 09:31

## 2024-02-07 ENCOUNTER — OFFICE VISIT (OUTPATIENT)
Dept: PEDIATRICS | Facility: CLINIC | Age: 64
End: 2024-02-07
Payer: COMMERCIAL

## 2024-02-07 VITALS
HEART RATE: 63 BPM | TEMPERATURE: 98.4 F | BODY MASS INDEX: 22.78 KG/M2 | HEIGHT: 69 IN | RESPIRATION RATE: 16 BRPM | WEIGHT: 153.8 LBS | SYSTOLIC BLOOD PRESSURE: 115 MMHG | OXYGEN SATURATION: 99 % | DIASTOLIC BLOOD PRESSURE: 72 MMHG

## 2024-02-07 DIAGNOSIS — E55.9 VITAMIN D DEFICIENCY: ICD-10-CM

## 2024-02-07 DIAGNOSIS — Z00.00 ENCOUNTER FOR MEDICARE ANNUAL WELLNESS EXAM: Primary | ICD-10-CM

## 2024-02-07 DIAGNOSIS — D32.9 MENINGIOMA (H): ICD-10-CM

## 2024-02-07 DIAGNOSIS — M81.0 OSTEOPOROSIS, UNSPECIFIED OSTEOPOROSIS TYPE, UNSPECIFIED PATHOLOGICAL FRACTURE PRESENCE: ICD-10-CM

## 2024-02-07 PROCEDURE — G0402 INITIAL PREVENTIVE EXAM: HCPCS | Performed by: INTERNAL MEDICINE

## 2024-02-07 ASSESSMENT — PAIN SCALES - GENERAL: PAINLEVEL: NO PAIN (0)

## 2024-02-07 NOTE — PATIENT INSTRUCTIONS
"Eating Healthy Foods: Care Instructions  With every meal, you can make healthy food choices. Try to eat a variety of fruits, vegetables, whole grains, lean proteins, and low-fat dairy products. This can help you get the right balance of nutrients, including vitamins and minerals. Small changes add up over time. You can start by adding one healthy food to your meals each day.    Try to make half your plate fruits and vegetables, one-fourth whole grains, and one-fourth lean proteins. Try including dairy with your meals.   Eat more fruits and vegetables. Try to have them with most meals and snacks.   Foods for healthy eating    Fruits    These can be fresh, frozen, canned, or dried.  Try to choose whole fruit rather than fruit juice.  Eat a variety of colors.    Vegetables    These can be fresh, frozen, canned, or dried.  Beans, peas, and lentils count too.    Whole grains    Choose whole-grain breads, cereals, and noodles.  Try brown rice.    Lean proteins    These can include lean meat, poultry, fish, and eggs.  You can also have tofu, beans, peas, lentils, nuts, and seeds.    Dairy    Try milk, yogurt, and cheese.  Choose low-fat or fat-free when you can.  If you need to, use lactose-free milk or fortified plant-based milk products, such as soy milk.    Water    Drink water when you're thirsty.  Limit sugar-sweetened drinks, including soda, fruit drinks, and sports drinks.  Where can you learn more?  Go to https://www.Triond.net/patiented  Enter T756 in the search box to learn more about \"Eating Healthy Foods: Care Instructions.\"  Current as of: February 28, 2023               Content Version: 13.8    8654-5470 [employer].   Care instructions adapted under license by your healthcare professional. If you have questions about a medical condition or this instruction, always ask your healthcare professional. [employer] disclaims any warranty or liability for your use of this " information.      Nutrition for Older Adults: Care Instructions  Overview     Good nutrition is important at any age. But it is especially important for older adults. Eating a healthy diet helps keep your body strong. And it can help lower your risk for disease.  As you get older, your body needs more of certain nutrients. These include vitamin B12, calcium, and vitamin D. But it may be harder for you to get these and other important nutrients. This could be for many reasons. You may not feel as hungry as you used to. Or you could have problems with your teeth or mouth that make it hard to chew. Or you may not enjoy planning and preparing meals, especially if you live alone.  Talk with your doctor if you want help getting the most nutrition from what you eat. The doctor may have you work with a dietitian to help you plan meals.  Follow-up care is a key part of your treatment and safety. Be sure to make and go to all appointments, and call your doctor if you are having problems. It's also a good idea to know your test results and keep a list of the medicines you take.  How can you care for yourself at home?  To stay healthy  Eat a variety of foods. The more you vary the foods you eat, the more vitamins, minerals, and other nutrients you get.  Take a multivitamin every day. Choose one with about 100% of the daily value (DV) for vitamins and minerals. Do not take more than 100% of the daily value for any vitamin or mineral unless your doctor tells you to. Talk with your doctor if you are not sure which multivitamin is right for you.  Eat lots of fruits and vegetables. Fresh, frozen, or no-salt canned vegetables and fruits in their own juice or light syrup are good choices.  Include foods that are high in vitamin B12 in your diet. Good choices are fortified breakfast cereal, nonfat or low-fat milk and other dairy products, meat, poultry, fish, and eggs.  Get enough calcium and vitamin D. Good choices include nonfat or  low-fat milk, cheese, and yogurt. Other good options are tofu, orange juice with added calcium, and some leafy green vegetables, such as jesus greens and kale. If you don't use milk products, talk to your doctor about calcium and vitamin D supplements.  Eat protein foods every day. Good choices include lean meat, fish, poultry, eggs, and cheese. Other good options are cooked beans, peanut butter, and nuts and seeds.  Choose whole grains for half of the grains you eat. Look for 100% whole wheat bread, whole-grain cereals, brown rice, and other whole grains.  If you have constipation  Eat high-fiber foods every day. These include fruits, vegetables, cooked dried beans, and whole grains.  Drink plenty of fluids. If you have kidney, heart, or liver disease and have to limit fluids, talk with your doctor before you increase the amount of fluids you drink.  Ask your doctor if stool softeners may help keep your bowels regular.  If you have mouth problems that make chewing hard  Pick canned or cooked fruits and vegetables. These are often softer.  Chop or shred meat, poultry, and fish. Add sauce or gravy to the meat to help keep it moist.  Pick other protein foods that are soft. These include cheese, peanut butter, cooked beans, cottage cheese, and eggs.  If you have trouble shopping for yourself  Ask a local food store to deliver groceries to your home.  Contact a volunteer center and ask for help.  Ask a family member or neighbor to help you.  If you have trouble preparing meals  If you are able, take a cooking class.  Use a microwave oven to cook TV dinners and other frozen or prepared foods.  Take part in group meal programs. You can find these through senior citizen programs.  Have meals brought to your home. Your community may offer programs that deliver meals, such as Meals on Wheels.  If your appetite is poor  Try eating smaller amounts of food more often. For example, eat 4 or 5 small meals a day instead of 1 or  "2 large meals.  Eat with family and friends. Or take part in group meal programs offered through volunteer programs. Eating with others may help your appetite. And it helps you be more social.  Ask your doctor if your medicines could cause appetite or taste problems. If so, ask about changing medicines.  Add spices and herbs to increase the flavor of food.  If you think you are depressed, ask your doctor for help. Depression can affect your appetite. And it can make it hard to do everyday activities like grocery shopping and making meals. Treatment can help.  When should you call for help?  Watch closely for changes in your health, and be sure to contact your doctor if you have any problems.  Where can you learn more?  Go to https://www.Cantimer.net/patiented  Enter L643 in the search box to learn more about \"Nutrition for Older Adults: Care Instructions.\"  Current as of: February 26, 2023               Content Version: 13.8    1840-7496 [employer].   Care instructions adapted under license by your healthcare professional. If you have questions about a medical condition or this instruction, always ask your healthcare professional. [employer] disclaims any warranty or liability for your use of this information.      Learning About Stress  What is stress?     Stress is your body's response to a hard situation. Your body can have a physical, emotional, or mental response. Stress is a fact of life for most people, and it affects everyone differently. What causes stress for you may not be stressful for someone else.  A lot of things can cause stress. You may feel stress when you go on a job interview, take a test, or run a race. This kind of short-term stress is normal and even useful. It can help you if you need to work hard or react quickly. For example, stress can help you finish an important job on time.  Long-term stress is caused by ongoing stressful situations or events. Examples of " long-term stress include long-term health problems, ongoing problems at work, or conflicts in your family. Long-term stress can harm your health.  How does stress affect your health?  When you are stressed, your body responds as though you are in danger. It makes hormones that speed up your heart, make you breathe faster, and give you a burst of energy. This is called the fight-or-flight stress response. If the stress is over quickly, your body goes back to normal and no harm is done.  But if stress happens too often or lasts too long, it can have bad effects. Long-term stress can make you more likely to get sick, and it can make symptoms of some diseases worse. If you tense up when you are stressed, you may develop neck, shoulder, or low back pain. Stress is linked to high blood pressure and heart disease.  Stress also harms your emotional health. It can make you kyle, tense, or depressed. Your relationships may suffer, and you may not do well at work or school.  What can you do to manage stress?  You can try these things to help manage stress:   Do something active. Exercise or activity can help reduce stress. Walking is a great way to get started. Even everyday activities such as housecleaning or yard work can help.  Try yoga or rosio chi. These techniques combine exercise and meditation. You may need some training at first to learn them.  Do something you enjoy. For example, listen to music or go to a movie. Practice your hobby or do volunteer work.  Meditate. This can help you relax, because you are not worrying about what happened before or what may happen in the future.  Do guided imagery. Imagine yourself in any setting that helps you feel calm. You can use online videos, books, or a teacher to guide you.  Do breathing exercises. For example:  From a standing position, bend forward from the waist with your knees slightly bent. Let your arms dangle close to the floor.  Breathe in slowly and deeply as you  "return to a standing position. Roll up slowly and lift your head last.  Hold your breath for just a few seconds in the standing position.  Breathe out slowly and bend forward from the waist.  Let your feelings out. Talk, laugh, cry, and express anger when you need to. Talking with supportive friends or family, a counselor, or a rafi leader about your feelings is a healthy way to relieve stress. Avoid discussing your feelings with people who make you feel worse.  Write. It may help to write about things that are bothering you. This helps you find out how much stress you feel and what is causing it. When you know this, you can find better ways to cope.  What can you do to prevent stress?  You might try some of these things to help prevent stress:  Manage your time. This helps you find time to do the things you want and need to do.  Get enough sleep. Your body recovers from the stresses of the day while you are sleeping.  Get support. Your family, friends, and community can make a difference in how you experience stress.  Limit your news feed. Avoid or limit time on social media or news that may make you feel stressed.  Do something active. Exercise or activity can help reduce stress. Walking is a great way to get started.  Where can you learn more?  Go to https://www.Treato.net/patiented  Enter N032 in the search box to learn more about \"Learning About Stress.\"  Current as of: February 26, 2023               Content Version: 13.8    2050-0516 [employer].   Care instructions adapted under license by your healthcare professional. If you have questions about a medical condition or this instruction, always ask your healthcare professional. [employer] disclaims any warranty or liability for your use of this information.      Preventive Care Advice   This is general advice given by our system to help you stay healthy. However, your care team may have specific advice just for you. Please talk " to your care team about your preventive care needs.  Nutrition  Eat 5 or more servings of fruits and vegetables each day.  Try wheat bread, brown rice and whole grain pasta (instead of white bread, rice, and pasta).  Get enough calcium and vitamin D. Check the label on foods and aim for 100% of the RDA (recommended daily allowance).  Lifestyle  Exercise at least 150 minutes each week  (30 minutes a day, 5 days a week).  Do muscle strengthening activities 2 days a week. These help control your weight and prevent disease.  No smoking.  Wear sunscreen to prevent skin cancer.  Have a dental exam and cleaning every 6 months.  Yearly exams  See your health care team every year to talk about:  Any changes in your health.  Any medicines your care team has prescribed.  Preventive care, family planning, and ways to prevent chronic diseases.  Shots (vaccines)   HPV shots (up to age 26), if you've never had them before.  Hepatitis B shots (up to age 59), if you've never had them before.  COVID-19 shot: Get this shot when it's due.  Flu shot: Get a flu shot every year.  Tetanus shot: Get a tetanus shot every 10 years.  Pneumococcal, hepatitis A, and RSV shots: Ask your care team if you need these based on your risk.  Shingles shot (for age 50 and up)  General health tests  Diabetes screening:  Starting at age 35, Get screened for diabetes at least every 3 years.  If you are younger than age 35, ask your care team if you should be screened for diabetes.  Cholesterol test: At age 39, start having a cholesterol test every 5 years, or more often if advised.  Bone density scan (DEXA): At age 50, ask your care team if you should have this scan for osteoporosis (brittle bones).  Hepatitis C: Get tested at least once in your life.  STIs (sexually transmitted infections)  Before age 24: Ask your care team if you should be screened for STIs.  After age 24: Get screened for STIs if you're at risk. You are at risk for STIs (including HIV)  if:  You are sexually active with more than one person.  You don't use condoms every time.  You or a partner was diagnosed with a sexually transmitted infection.  If you are at risk for HIV, ask about PrEP medicine to prevent HIV.  Get tested for HIV at least once in your life, whether you are at risk for HIV or not.  Cancer screening tests  Cervical cancer screening: If you have a cervix, begin getting regular cervical cancer screening tests starting at age 21.  Breast cancer scan (mammogram): If you've ever had breasts, begin having regular mammograms starting at age 40. This is a scan to check for breast cancer.  Colon cancer screening: It is important to start screening for colon cancer at age 45.  Have a colonoscopy test every 10 years (or more often if you're at risk) Or, ask your provider about stool tests like a FIT test every year or Cologuard test every 3 years.  To learn more about your testing options, visit:   [email or web address].  For help making a decision, visit:   https://Kextil.AutoRadio/as43952.  Prostate cancer screening test: If you have a prostate, ask your care team if a prostate cancer screening test (PSA) at age 55 is right for you.  Lung cancer screening: If you are a current or former smoker ages 50 to 80, ask your care team if ongoing lung cancer screenings are right for you.  For informational purposes only. Not to replace the advice of your health care provider. Copyright   2023 [facility] [employer]. All rights reserved. Clinically reviewed by the [facility]. Gezlong [id number] - REV 01/24.

## 2024-02-07 NOTE — PROGRESS NOTES
Preventive Care Visit  [facility]  Neo Don MD, Internal Medicine - Pediatrics  Feb 7, 2024    Assessment & Plan     Encounter for Medicare annual wellness exam  d/w pt preventative care measures including seat belt use, bike helmet, moderation of EtOH, avoiding tobacco, avoiding excessive sun exposure/sunscreen, wt management or wt loss if BMI > 30, need to screen for lipid disorders, mood disorders, CAD risk factors, etc. Also discussed accident prevention and future RHM schedule.     Meningioma (H)  discussed with patient (or patient's parents/caregiver) pathophysiology of condition and treatment options.  reviwed imagine, has follow-up with beurosurgery. next week. recommned eye check as well, if dizziness recurs recommned she be seen for office visit. Also d/w pt signs/symptoms of worsening of condition and need for urgent ED evaluation. Patient verbalized understanding and is agreeable to this plan.      Vitamin D deficiency  due for labs  - Vitamin D Deficiency; Future    Osteoporosis, unspecified osteoporosis type, unspecified pathological fracture presence  discussed with patient (or patient's parents/caregiver) pathophysiology of condition and treatment options.  reviwed options and patient not interested in bisphosphonate due to side effects when she treid it before, will work on diet, due for labs. recheck DEXA.  - Vitamin D Deficiency; Future      Counseling  Appropriate preventive services were discussed with this patient, including applicable screening as appropriate for fall prevention, nutrition, physical activity, Tobacco-use cessation, weight loss and cognition.  Checklist reviewing preventive services available has been given to the patient.    Return in about 1 year (around 2/7/2025) for Next Annual Wellness Visit, or sooner if symptoms persist or worsen.    Neo Don M.D.  Internal Medicine-Pediatrics      Return in about 1 year (around 2/7/2025) for Next  Annual Wellness Visit, or sooner if symptoms persist or worsen.      Neo Don M.D.  Internal Medicine-Pediatrics      Subjective   Latesha is a 64 year old, presenting for the following:  Physical        2/7/2024     2:45 PM   Additional Questions   Roomed by RHS   Accompanied by SELF         2/7/2024     2:45 PM   Patient Reported Additional Medications   Patient reports taking the following new medications NONE        Health Care Directive  Patient has a Health Care Directive on file      HPI  patient here for annual visit, feels well,has neurosurg follow-up appointment schedueld. had one episode of dizziness, some visiton issues that lasted a couple hours and better after a nap hasn't recurred, is due for eye exam. no headaches numbness/tingling. Mod is ood, going to [place] [employer] for a vacation and is looking forward to this. No other concerns or complaints today.       The 10-year ASCVD risk score (Kvng SOTOMAYOR, et al., 2019) is: 4.9%    Values used to calculate the score:      Age: 64 years      Sex: Female      Is Non- : No      Diabetic: No      Tobacco smoker: No      Systolic Blood Pressure: 115 mmHg      Is BP treated: No      HDL Cholesterol: 56 mg/dL      Total Cholesterol: 235 mg/dL   1/31/2024   General Health   How would you rate your overall physical health? Excellent   Feel stress (tense, anxious, or unable to sleep) Only a little   (!) STRESS CONCERN      1/31/2024   Nutrition   Diet: Regular (no restrictions)         1/31/2024   Exercise   Days per week of moderate/strenous exercise 4 days   Average minutes spent exercising at this level 30 min         1/31/2024   Social Factors   Frequency of gathering with friends or relatives More than three times a week   Worry food won't last until get money to buy more No   Food not last or not have enough money for food? No   Do you have housing?  Yes   Are you worried about losing your housing? No   Lack of transportation? No    Unable to get utilities (heat,electricity)? No         1/31/2024   Fall Risk   Fallen 2 or more times in the past year? No    No   Trouble with walking or balance? No          1/31/2024   Activities of Daily Living- Home Safety   Needs help with the following daily activites None of the above   Safety concerns in the home None of the above         1/31/2024   Dental   Dentist two times every year? Yes         1/31/2024   Hearing Screening   Hearing concerns? None of the above         1/31/2024   Driving Risk Screening   Patient/family members have concerns about driving No         1/31/2024   General Alertness/Fatigue Screening   Have you been more tired than usual lately? No         1/31/2024   Urinary Incontinence Screening   Bothered by leaking urine in past 6 months No         1/31/2024   TB Screening   Were you born outside of [place]?  No         Today's PHQ-2 Score:       2/7/2024     2:39 PM   PHQ-2 ( 1999 [employer])   Q1: Little interest or pleasure in doing things 0   Q2: Feeling down, depressed or hopeless 0   PHQ-2 Score 0   Q1: Little interest or pleasure in doing things Not at all   Q2: Feeling down, depressed or hopeless Not at all   PHQ-2 Score 0           1/31/2024   Substance Use   Alcohol more than 3/day or more than 7/wk Not Applicable   Do you have a current opioid prescription? No   How severe/bad is pain from 1 to 10? 0/10 (No Pain)   Do you use any other substances recreationally? No     Social History     Tobacco Use    Smoking status: Never    Smokeless tobacco: Never   Vaping Use    Vaping Use: Never used   Substance Use Topics    Alcohol use: No    Drug use: No           12/30/2022   LAST FHS-7 RESULTS   1st degree relative breast or ovarian cancer No   Any relative bilateral breast cancer No   Any male have breast cancer No   Any woman have breast and ovarian cancer No   Any woman with breast cancer before 50yrs No   2 or more relatives with breast and/or ovarian cancer Yes   2 or more  relatives with breast and/or bowel cancer Yes        Mammogram Screening - Mammogram every 1-2 years updated in Health Maintenance based on mutual decision making      History of abnormal Pap smear: NO - age 64        Latest Ref Rng & Units 1/5/2023    11:07 AM 9/27/2019     8:10 AM 9/27/2019     8:01 AM   PAP / HPV   PAP  Negative for Intraepithelial Lesion or Malignancy (NILM)      PAP (Historical)    NIL    HPV 16 DNA Negative Negative  Negative     HPV 18 DNA Negative Negative  Negative     Other HR HPV Negative Negative  Negative       The 10-year ASCVD risk score (Kvng SOTOMAYOR, et al., 2019) is: 4.9%    Values used to calculate the score:      Age: 64 years      Sex: Female      Is Non- : No      Diabetic: No      Tobacco smoker: No      Systolic Blood Pressure: 115 mmHg      Is BP treated: No      HDL Cholesterol: 56 mg/dL      Total Cholesterol: 235 mg/dL            Reviewed and updated as needed this visit by Provider                      Current providers sharing in care for this patient include:  Patient Care Team:  Neo Don MD as PCP - General (Internal Medicine - Pediatrics)  Chelsea Kenny MD as Hospitalist (Endocrinology, Diabetes, and Metabolism)  Maday Way MD as Assigned Surgical Provider  Chelsea Kenny MD as Assigned Endocrinology Provider  Nely Cardoso MD as Assigned PCP  Chloe Knight PA-C as Assigned Neuroscience Provider    The following health maintenance items are reviewed in Epic and correct as of today:  Health Maintenance   Topic Date Due    ANNUAL REVIEW OF HM ORDERS  Never done    RSV VACCINE (Pregnancy & 60+) (1 - 1-dose 60+ series) Never done    Pneumococcal Vaccine: 65+ Years (1 of 1 - PCV) 01/20/2024    DEXA  04/01/2024    LIPID  02/02/2025    MEDICARE ANNUAL WELLNESS VISIT  02/07/2025    FALL RISK ASSESSMENT  02/07/2025    MAMMO SCREENING  05/24/2025    ADVANCE CARE PLANNING  02/09/2026    GLUCOSE  02/02/2027     "COLORECTAL CANCER SCREENING  08/24/2027    DTAP/TDAP/TD IMMUNIZATION (5 - Td or Tdap) 06/24/2029    HEPATITIS C SCREENING  Completed    HIV SCREENING  Completed    PHQ-2 (once per calendar year)  Completed    INFLUENZA VACCINE  Completed    ZOSTER IMMUNIZATION  Completed    COVID-19 Vaccine  Completed    IPV IMMUNIZATION  Aged Out    HPV IMMUNIZATION  Aged Out    MENINGITIS IMMUNIZATION  Aged Out    RSV MONOCLONAL ANTIBODY  Aged Out    PAP  Discontinued     Review of Systems  A complete 7 point review of systems was taken and negative except for those noted in the subjective/HPI section(s) above       Objective    Exam  /72 (BP Location: Right arm, Patient Position: Sitting, Cuff Size: Adult Regular)   Pulse 63   Temp 98.4  F (36.9  C) (Temporal)   Resp 16   Ht 1.742 m (5' 8.58\")   Wt 69.8 kg (153 lb 12.8 oz)   SpO2 99%   BMI 22.99 kg/m     Estimated body mass index is 22.99 kg/m  as calculated from the following:    Height as of this encounter: 1.742 m (5' 8.58\").    Weight as of this encounter: 69.8 kg (153 lb 12.8 oz).    Physical Exam  GENERAL: healthy, alert and no distress  EYES: Eyes grossly normal to inspection, PERRL and conjunctivae and sclerae normal; normal funduscopic exam, clear disk margins   HENT: ear canals and TM's normal, op clear, mucous membranes moist   NECK: no adenopathy, no asymmetry  RESP: lungs clear to auscultation - no rales, rhonchi or wheezes  CV: regular rate and rhythm, normal S1 S2, no S3 or S4, no murmur, click or rub, no peripheral edema   ABDOMEN: soft, nontender, no hepatosplenomegaly, no masses and bowel sounds normal  MS: no gross musculoskeletal defects noted, no edema  SKIN: no suspicious lesions or rashes  NEURO: Normal strength and tone, mentation intact and speech normal  PSYCH: mentation appears normal, affect normal/bright          2/7/2024   Mini Cog   Clock Draw Score 2 Normal   3 Item Recall 3 objects recalled   Mini Cog Total Score 5         Vision " Screen  Patient wears corrective lenses (select all that apply): Worn during vision screen  Vision Screen Results: Pass    Signed Electronically by: Neo Don MD

## 2024-02-16 ENCOUNTER — OFFICE VISIT (OUTPATIENT)
Dept: NEUROSURGERY | Facility: CLINIC | Age: 64
End: 2024-02-16
Payer: COMMERCIAL

## 2024-02-16 VITALS
BODY MASS INDEX: 22.96 KG/M2 | HEART RATE: 78 BPM | OXYGEN SATURATION: 99 % | WEIGHT: 153.6 LBS | SYSTOLIC BLOOD PRESSURE: 111 MMHG | DIASTOLIC BLOOD PRESSURE: 76 MMHG

## 2024-02-16 DIAGNOSIS — D32.9 MENINGIOMA (H): Primary | ICD-10-CM

## 2024-02-16 PROCEDURE — 99213 OFFICE O/P EST LOW 20 MIN: CPT | Performed by: PHYSICIAN ASSISTANT

## 2024-02-16 ASSESSMENT — PAIN SCALES - GENERAL: PAINLEVEL: NO PAIN (0)

## 2024-02-16 NOTE — PROGRESS NOTES
[facility]  Department of Neurosurgery    Name: Latesha Riuz  MRN: [id number]  Age: 64 year old  : 2024      Chief Complaint:   Left frontal convexity meningioma, follow up visit    History of Present Illness:   Latesha Ruiz is a 64 year old female with a history of osteoporosis who is seen today for follow up regarding an incidentally discovered left frontal meningioma. This was found upon work up of ongoing sinus issues. We met last November initially, with plans for 3 month follow up with an MRI. This was performed on . Today she reports feeling reasonably well. She still has trouble with her left ear feeling plugged and notes occasional dizziness.     Review of Systems:   Pertinent items are noted in HPI or as in patient entered ROS below, remainder of complete ROS is negative.     Physical Exam:   /76 (BP Location: Right arm, Patient Position: Sitting, Cuff Size: Adult Regular)   Pulse 78   Wt 69.7 kg (153 lb 9.6 oz)   SpO2 99%   BMI 22.96 kg/m     General: No acute distress.    Eyes: Conjunctivae are normal.  MSK: Moves all extremities.  No obvious deformity.  Neuro: The patient is fully oriented. Speech is normal. Facial nerve function is normal, rated as a House Brackmann 1. Gait is normal.  Psych: Normal mood and affect. Behavior is normal.      Imaging:  We reviewed the MRI from 2024. This shows a largely stable appearing left frontal meningioma along the superior sagittal sinus when compared to the CT of the sinuses, though comparison is a bit difficult. There is also an additional, smaller dural based lesion near the frontalis. Will contact Radiology for repeat read on this.     Assessment:  Left frontal meningiomas, follow up visit    Plan:  We reviewed imaging today. When compared to her CT, I believe this meningioma appears largely stable though this is a difficult comparison. We'll plan for a close follow up in 6 months with repeat MRI. We  also discussed the other small left frontal meningioma and will watch this as well on follow up.       Chloe Knight PA-C  Department of Neurosurgery

## 2024-02-21 ENCOUNTER — TELEPHONE (OUTPATIENT)
Dept: NEUROSURGERY | Facility: CLINIC | Age: 64
End: 2024-02-21
Payer: COMMERCIAL

## 2024-04-04 ENCOUNTER — ANCILLARY PROCEDURE (OUTPATIENT)
Dept: BONE DENSITY | Facility: CLINIC | Age: 64
End: 2024-04-04
Attending: INTERNAL MEDICINE
Payer: COMMERCIAL

## 2024-04-04 DIAGNOSIS — M81.0 OSTEOPOROSIS, UNSPECIFIED OSTEOPOROSIS TYPE, UNSPECIFIED PATHOLOGICAL FRACTURE PRESENCE: ICD-10-CM

## 2024-04-04 PROCEDURE — 77080 DXA BONE DENSITY AXIAL: CPT | Mod: TC | Performed by: PHYSICIAN ASSISTANT

## 2024-08-05 ENCOUNTER — HOSPITAL ENCOUNTER (OUTPATIENT)
Dept: MRI IMAGING | Facility: CLINIC | Age: 64
Discharge: HOME | End: 2024-08-05
Attending: PHYSICIAN ASSISTANT | Admitting: PHYSICIAN ASSISTANT
Payer: MEDICARE

## 2024-08-05 DIAGNOSIS — D32.9 MENINGIOMA (H): ICD-10-CM

## 2024-08-05 PROCEDURE — A9585 GADOBUTROL INJECTION: HCPCS | Performed by: PHYSICIAN ASSISTANT

## 2024-08-05 PROCEDURE — 255N000002 HC RX 255 OP 636: Performed by: PHYSICIAN ASSISTANT

## 2024-08-05 PROCEDURE — 70553 MRI BRAIN STEM W/O & W/DYE: CPT | Mod: MG

## 2024-08-05 RX ORDER — GADOBUTROL 604.72 MG/ML
7 INJECTION INTRAVENOUS ONCE
Status: COMPLETED | OUTPATIENT
Start: 2024-08-05 | End: 2024-08-05

## 2024-08-05 RX ADMIN — GADOBUTROL 7 ML: 604.72 INJECTION INTRAVENOUS at 11:45

## 2024-08-06 ENCOUNTER — LAB (OUTPATIENT)
Dept: LAB | Facility: CLINIC | Age: 64
End: 2024-08-06
Payer: MEDICARE

## 2024-08-06 ENCOUNTER — TELEPHONE (OUTPATIENT)
Dept: PEDIATRICS | Facility: CLINIC | Age: 64
End: 2024-08-06

## 2024-08-06 DIAGNOSIS — E55.9 VITAMIN D DEFICIENCY: ICD-10-CM

## 2024-08-06 DIAGNOSIS — M81.0 OSTEOPOROSIS, UNSPECIFIED OSTEOPOROSIS TYPE, UNSPECIFIED PATHOLOGICAL FRACTURE PRESENCE: ICD-10-CM

## 2024-08-06 LAB — VIT D+METAB SERPL-MCNC: 25 NG/ML (ref 20–50)

## 2024-08-06 PROCEDURE — 82306 VITAMIN D 25 HYDROXY: CPT

## 2024-08-06 PROCEDURE — 36415 COLL VENOUS BLD VENIPUNCTURE: CPT

## 2024-08-06 NOTE — TELEPHONE ENCOUNTER
Pt called wanting to know the expiration date of her Vitamin D lab draw order. Pt was informed that it expires in February, but will need to come in soon for a check. Pt understood and was assisted with making a lab appt for today.    CHRISS MajorN, RN     [facility]    08/06/2024 at 2:38 PM

## 2024-08-20 ENCOUNTER — OFFICE VISIT (OUTPATIENT)
Dept: DERMATOLOGY | Facility: CLINIC | Age: 64
End: 2024-08-20
Payer: MEDICARE

## 2024-08-20 DIAGNOSIS — L82.1 SEBORRHEIC KERATOSIS: ICD-10-CM

## 2024-08-20 DIAGNOSIS — B07.9 VERRUCA VULGARIS: Primary | ICD-10-CM

## 2024-08-20 DIAGNOSIS — D22.9 MULTIPLE NEVI: ICD-10-CM

## 2024-08-20 PROCEDURE — 99213 OFFICE O/P EST LOW 20 MIN: CPT | Mod: 25 | Performed by: DERMATOLOGY

## 2024-08-20 PROCEDURE — 17110 DESTRUCTION B9 LES UP TO 14: CPT | Performed by: DERMATOLOGY

## 2024-08-20 NOTE — PROGRESS NOTES
Dermatology Clinic Note    Dermatology Problem List:  1. Actinic keratoses  2. Verruca   3. Benign pigmented nevi   4. Melasma  5. Seborrheic keratoses  6. Lipoma L forearm    Assessment and Plan:  1. Benign pigmented nevi: No lesions of concern. Sun protection recommended.      2. Seborrheic keratoses: Benign hyperkeratotic papules and plaques. No treatment advised unless irritation occurs.      3. Verruca on the L 3rd finger. Ln today.   Liquid nitrogen was applied for 10-12 seconds to the skin lesion for 2 freeze/thaw cycles.         RTC 1 year, sooner prn.       Thank you for involving me in this patient's care.     Maday Way MD   of Dermatology  [facility]      CC: Referred Self, MD  No address on file    Maday Anaya MD    ____________________________________________________________________________________________________________________________________________    CC: Patient presents with:  Skin Check    HPI: Latesha Ruiz is a 64 year old female presenting for yearly skin check. She notes so new raised areas on the chest. Wart on the finger is improved but not fully resolved.       Patient Active Problem List   Diagnosis    Allergic rhinitis    Osteoporosis    CARDIOVASCULAR SCREENING; LDL GOAL LESS THAN 160    Lactose intolerance    Stress incontinence    Sebaceous hyperplasia of face    AK (actinic keratosis)    Vitamin D deficiency    Meningioma (H)       Allergies   Allergen Reactions    Asa [Aspirin]     Erythromycin Nausea    Morphine And Codeine Nausea and Vomiting    Penicillins Hives         Current Outpatient Medications   Medication Sig Dispense Refill    fluticasone (FLONASE) 50 MCG/ACT nasal spray Spray 1 spray into both nostrils daily (Patient not taking: Reported on 8/20/2024)      lactase (LACTAID) 3000 UNIT tablet  (Patient not taking: Reported on 8/20/2024)       No current facility-administered medications for this visit.         Social  History     Tobacco Use    Smoking status: Never    Smokeless tobacco: Never   Vaping Use    Vaping status: Never Used   Substance Use Topics    Alcohol use: No    Drug use: No       EXAM:  There were no vitals taken for this visit.  GEN: Alert, no distress  SKIN: Full body excluding genitals  --Tan and gray 3-5 mm waxy papules on the upper chest, extensor upper arms, back, thighs, shins  --Cherry red papules on the chest, abdomen, back  --Chest with waxy tan papules and hypopigmented papules  --Waxy papule on the L mid cheek  --Yellow pink papules on the forehead  --verrucous papule 2 mm on the L 3rd palmar finger  --Few medium brown macules on the back, arms, legs

## 2024-08-20 NOTE — LETTER
8/20/2024      RE: Latesha Ruiz  [address]  [place] [address]     Dear Colleague,    Thank you for the opportunity to participate in the care of your patient, Latesha Ruiz, at the [facility]. Please see a copy of my visit note below.    Dermatology Clinic Note    Dermatology Problem List:  1. Actinic keratoses  2. Verruca   3. Benign pigmented nevi   4. Melasma  5. Seborrheic keratoses  6. Lipoma L forearm    Assessment and Plan:  1. Benign pigmented nevi: No lesions of concern. Sun protection recommended.      2. Seborrheic keratoses: Benign hyperkeratotic papules and plaques. No treatment advised unless irritation occurs.      3. Verruca on the L 3rd finger. Ln today.   Liquid nitrogen was applied for 10-12 seconds to the skin lesion for 2 freeze/thaw cycles.         RTC 1 year, sooner prn.       Thank you for involving me in this patient's care.     Maday Way MD   of Dermatology  [facility]      CC: Referred Self, MD  No address on file    Maday Anaya MD    ____________________________________________________________________________________________________________________________________________    CC: Patient presents with:  Skin Check    HPI: Latesha Ruiz is a 64 year old female presenting for yearly skin check. She notes so new raised areas on the chest. Wart on the finger is improved but not fully resolved.       Patient Active Problem List   Diagnosis     Allergic rhinitis     Osteoporosis     CARDIOVASCULAR SCREENING; LDL GOAL LESS THAN 160     Lactose intolerance     Stress incontinence     Sebaceous hyperplasia of face     AK (actinic keratosis)     Vitamin D deficiency     Meningioma (H)       Allergies   Allergen Reactions     Asa [Aspirin]      Erythromycin Nausea     Morphine And Codeine Nausea and Vomiting     Penicillins Hives         Current  Outpatient Medications   Medication Sig Dispense Refill     fluticasone (FLONASE) 50 MCG/ACT nasal spray Spray 1 spray into both nostrils daily (Patient not taking: Reported on 8/20/2024)       lactase (LACTAID) 3000 UNIT tablet  (Patient not taking: Reported on 8/20/2024)       No current facility-administered medications for this visit.         Social History     Tobacco Use     Smoking status: Never     Smokeless tobacco: Never   Vaping Use     Vaping status: Never Used   Substance Use Topics     Alcohol use: No     Drug use: No       EXAM:  There were no vitals taken for this visit.  GEN: Alert, no distress  SKIN: Full body excluding genitals  --Tan and gray 3-5 mm waxy papules on the upper chest, extensor upper arms, back, thighs, shins  --Cherry red papules on the chest, abdomen, back  --Chest with waxy tan papules and hypopigmented papules  --Waxy papule on the L mid cheek  --Yellow pink papules on the forehead  --verrucous papule 2 mm on the L 3rd palmar finger  --Few medium brown macules on the back, arms, legs                  Please do not hesitate to contact me if you have any questions/concerns.     Sincerely,       Maday Way MD

## 2024-08-23 ENCOUNTER — OFFICE VISIT (OUTPATIENT)
Dept: NEUROSURGERY | Facility: CLINIC | Age: 64
End: 2024-08-23
Attending: PHYSICIAN ASSISTANT
Payer: MEDICARE

## 2024-08-23 VITALS
OXYGEN SATURATION: 98 % | HEART RATE: 75 BPM | SYSTOLIC BLOOD PRESSURE: 111 MMHG | DIASTOLIC BLOOD PRESSURE: 73 MMHG | RESPIRATION RATE: 16 BRPM

## 2024-08-23 DIAGNOSIS — D32.9 MENINGIOMA (H): Primary | ICD-10-CM

## 2024-08-23 PROCEDURE — 99213 OFFICE O/P EST LOW 20 MIN: CPT | Mod: 24 | Performed by: PHYSICIAN ASSISTANT

## 2024-08-23 ASSESSMENT — PAIN SCALES - GENERAL: PAINLEVEL: NO PAIN (0)

## 2024-08-23 NOTE — PROGRESS NOTES
[facility]  Department of Neurosurgery  [facility]    Name: Latesha Ruiz  MRN: [id number]  Age: 64 year old  : [birth year]24      Chief Complaint:   Left frontal convexity meningioma, follow up visit    History of Present Illness:   Latesha Ruiz is a 64 year old female with a history of osteoporosis who is seen today for follow up regarding a left frontal meningioma found incidentally last year upon workup for sinus issues. Our last visit was February, at which time her imaging appeared stable. Today she reports feeling well. She had pain in two of her left foot toes but thinks it was her shoes.     Review of Systems:   Pertinent items are noted in HPI or as in patient entered ROS below, remainder of complete ROS is negative.     Physical Exam:   /73 (BP Location: Right arm, Patient Position: Sitting)   Pulse 75   Resp 16   SpO2 98%    General: No acute distress.    Eyes: Conjunctivae are normal.  MSK: Moves all extremities.  No obvious deformity.  Neuro: The patient is fully oriented. Speech is normal. Facial nerve function is normal, rated as a House Brackmann 1. Gait is normal.   Psych: Normal mood and affect. Behavior is normal.      Imaging:  We reviewed the MRI done 2024 which reveals stable high left frontal, parafalcine meningioma along with a stable appearing planum meningioma. Neither have associated FLAIR changes.      Assessment:  Left parafalcine and planum meningiomas, follow up visit    Plan:  We reviewed imaging findings today in clinic which appear stable from previous. We'll plan to see Latesha back in 1 year, and she was encouraged to reach out sooner with new concerns in the meantime.        Chloe Knight PA-C  Department of Neurosurgery

## 2025-01-04 SDOH — HEALTH STABILITY: PHYSICAL HEALTH: ON AVERAGE, HOW MANY DAYS PER WEEK DO YOU ENGAGE IN MODERATE TO STRENUOUS EXERCISE (LIKE A BRISK WALK)?: 3 DAYS

## 2025-01-04 SDOH — HEALTH STABILITY: PHYSICAL HEALTH: ON AVERAGE, HOW MANY MINUTES DO YOU ENGAGE IN EXERCISE AT THIS LEVEL?: 30 MIN

## 2025-01-04 ASSESSMENT — SOCIAL DETERMINANTS OF HEALTH (SDOH): HOW OFTEN DO YOU GET TOGETHER WITH FRIENDS OR RELATIVES?: MORE THAN THREE TIMES A WEEK

## 2025-01-08 ENCOUNTER — OFFICE VISIT (OUTPATIENT)
Dept: PEDIATRICS | Facility: CLINIC | Age: 65
End: 2025-01-08
Attending: INTERNAL MEDICINE
Payer: MEDICARE

## 2025-01-08 VITALS
OXYGEN SATURATION: 97 % | SYSTOLIC BLOOD PRESSURE: 100 MMHG | WEIGHT: 145.2 LBS | RESPIRATION RATE: 20 BRPM | BODY MASS INDEX: 21.51 KG/M2 | TEMPERATURE: 97.8 F | DIASTOLIC BLOOD PRESSURE: 70 MMHG | HEIGHT: 69 IN

## 2025-01-08 DIAGNOSIS — Z13.6 CARDIOVASCULAR SCREENING; LDL GOAL LESS THAN 160: ICD-10-CM

## 2025-01-08 DIAGNOSIS — Z23 ENCOUNTER FOR IMMUNIZATION: ICD-10-CM

## 2025-01-08 DIAGNOSIS — J30.0 VASOMOTOR RHINITIS: ICD-10-CM

## 2025-01-08 DIAGNOSIS — Z00.00 ENCOUNTER FOR MEDICARE ANNUAL WELLNESS EXAM: Primary | ICD-10-CM

## 2025-01-08 DIAGNOSIS — E55.9 VITAMIN D DEFICIENCY: ICD-10-CM

## 2025-01-08 DIAGNOSIS — M81.0 OSTEOPOROSIS, UNSPECIFIED OSTEOPOROSIS TYPE, UNSPECIFIED PATHOLOGICAL FRACTURE PRESENCE: ICD-10-CM

## 2025-01-08 DIAGNOSIS — R79.89 ELEVATED LIVER FUNCTION TESTS: ICD-10-CM

## 2025-01-08 DIAGNOSIS — D32.9 MENINGIOMA (H): ICD-10-CM

## 2025-01-08 LAB
ALBUMIN SERPL BCG-MCNC: 4.3 G/DL (ref 3.5–5.2)
ALP SERPL-CCNC: 64 U/L (ref 40–150)
ALT SERPL W P-5'-P-CCNC: 34 U/L (ref 0–50)
ANION GAP SERPL CALCULATED.3IONS-SCNC: 9 MMOL/L (ref 7–15)
AST SERPL W P-5'-P-CCNC: 26 U/L (ref 0–45)
BASOPHILS # BLD AUTO: 0 10E3/UL (ref 0–0.2)
BASOPHILS NFR BLD AUTO: 1 %
BILIRUB SERPL-MCNC: 0.6 MG/DL
BUN SERPL-MCNC: 14 MG/DL (ref 8–23)
CALCIUM SERPL-MCNC: 9.2 MG/DL (ref 8.8–10.4)
CHLORIDE SERPL-SCNC: 104 MMOL/L (ref 98–107)
CHOLEST SERPL-MCNC: 218 MG/DL
CREAT SERPL-MCNC: 0.79 MG/DL (ref 0.51–0.95)
EGFRCR SERPLBLD CKD-EPI 2021: 83 ML/MIN/1.73M2
EOSINOPHIL # BLD AUTO: 0.2 10E3/UL (ref 0–0.7)
EOSINOPHIL NFR BLD AUTO: 4 %
ERYTHROCYTE [DISTWIDTH] IN BLOOD BY AUTOMATED COUNT: 12.3 % (ref 10–15)
FASTING STATUS PATIENT QL REPORTED: YES
FASTING STATUS PATIENT QL REPORTED: YES
GLUCOSE SERPL-MCNC: 84 MG/DL (ref 70–99)
HCO3 SERPL-SCNC: 27 MMOL/L (ref 22–29)
HCT VFR BLD AUTO: 44.1 % (ref 35–47)
HDLC SERPL-MCNC: 60 MG/DL
HGB BLD-MCNC: 14.7 G/DL (ref 11.7–15.7)
IMM GRANULOCYTES # BLD: 0 10E3/UL
IMM GRANULOCYTES NFR BLD: 0 %
LDLC SERPL CALC-MCNC: 128 MG/DL
LYMPHOCYTES # BLD AUTO: 1.7 10E3/UL (ref 0.8–5.3)
LYMPHOCYTES NFR BLD AUTO: 36 %
MCH RBC QN AUTO: 28.8 PG (ref 26.5–33)
MCHC RBC AUTO-ENTMCNC: 33.3 G/DL (ref 31.5–36.5)
MCV RBC AUTO: 86 FL (ref 78–100)
MONOCYTES # BLD AUTO: 0.3 10E3/UL (ref 0–1.3)
MONOCYTES NFR BLD AUTO: 6 %
NEUTROPHILS # BLD AUTO: 2.5 10E3/UL (ref 1.6–8.3)
NEUTROPHILS NFR BLD AUTO: 53 %
NONHDLC SERPL-MCNC: 158 MG/DL
PLATELET # BLD AUTO: 262 10E3/UL (ref 150–450)
POTASSIUM SERPL-SCNC: 3.8 MMOL/L (ref 3.4–5.3)
PROT SERPL-MCNC: 6.9 G/DL (ref 6.4–8.3)
RBC # BLD AUTO: 5.11 10E6/UL (ref 3.8–5.2)
SODIUM SERPL-SCNC: 140 MMOL/L (ref 135–145)
TRIGL SERPL-MCNC: 151 MG/DL
VIT D+METAB SERPL-MCNC: 20 NG/ML (ref 20–50)
WBC # BLD AUTO: 4.7 10E3/UL (ref 4–11)

## 2025-01-08 PROCEDURE — 82306 VITAMIN D 25 HYDROXY: CPT | Performed by: INTERNAL MEDICINE

## 2025-01-08 PROCEDURE — 36415 COLL VENOUS BLD VENIPUNCTURE: CPT | Performed by: INTERNAL MEDICINE

## 2025-01-08 PROCEDURE — 85025 COMPLETE CBC W/AUTO DIFF WBC: CPT | Performed by: INTERNAL MEDICINE

## 2025-01-08 PROCEDURE — 99213 OFFICE O/P EST LOW 20 MIN: CPT | Mod: 25 | Performed by: INTERNAL MEDICINE

## 2025-01-08 PROCEDURE — 90677 PCV20 VACCINE IM: CPT | Performed by: INTERNAL MEDICINE

## 2025-01-08 PROCEDURE — G0438 PPPS, INITIAL VISIT: HCPCS | Performed by: INTERNAL MEDICINE

## 2025-01-08 PROCEDURE — 80061 LIPID PANEL: CPT | Performed by: INTERNAL MEDICINE

## 2025-01-08 PROCEDURE — 80053 COMPREHEN METABOLIC PANEL: CPT | Performed by: INTERNAL MEDICINE

## 2025-01-08 PROCEDURE — G0009 ADMIN PNEUMOCOCCAL VACCINE: HCPCS | Performed by: INTERNAL MEDICINE

## 2025-01-08 RX ORDER — IPRATROPIUM BROMIDE 42 UG/1
2 SPRAY, METERED NASAL 4 TIMES DAILY
Qty: 15 ML | Refills: 3 | Status: SHIPPED | OUTPATIENT
Start: 2025-01-08

## 2025-01-08 ASSESSMENT — PAIN SCALES - GENERAL: PAINLEVEL_OUTOF10: NO PAIN (1)

## 2025-01-08 NOTE — PATIENT INSTRUCTIONS
Patient Education   Preventive Care Advice   This is general advice given by our system to help you stay healthy. However, your care team may have specific advice just for you. Please talk to your care team about your preventive care needs.  Nutrition  Eat 5 or more servings of fruits and vegetables each day.  Try wheat bread, brown rice and whole grain pasta (instead of white bread, rice, and pasta).  Get enough calcium and vitamin D. Check the label on foods and aim for 100% of the RDA (recommended daily allowance).  Lifestyle  Exercise at least 150 minutes each week  (30 minutes a day, 5 days a week).  Do muscle strengthening activities 2 days a week. These help control your weight and prevent disease.  No smoking.  Wear sunscreen to prevent skin cancer.  Have a dental exam and cleaning every 6 months.  Yearly exams  See your health care team every year to talk about:  Any changes in your health.  Any medicines your care team has prescribed.  Preventive care, family planning, and ways to prevent chronic diseases.  Shots (vaccines)   HPV shots (up to age 26), if you've never had them before.  Hepatitis B shots (up to age 59), if you've never had them before.  COVID-19 shot: Get this shot when it's due.  Flu shot: Get a flu shot every year.  Tetanus shot: Get a tetanus shot every 10 years.  Pneumococcal, hepatitis A, and RSV shots: Ask your care team if you need these based on your risk.  Shingles shot (for age 50 and up)  General health tests  Diabetes screening:  Starting at age 35, Get screened for diabetes at least every 3 years.  If you are younger than age 35, ask your care team if you should be screened for diabetes.  Cholesterol test: At age 39, start having a cholesterol test every 5 years, or more often if advised.  Bone density scan (DEXA): At age 50, ask your care team if you should have this scan for osteoporosis (brittle bones).  Hepatitis C: Get tested at least once in your life.  STIs (sexually  transmitted infections)  Before age 24: Ask your care team if you should be screened for STIs.  After age 24: Get screened for STIs if you're at risk. You are at risk for STIs (including HIV) if:  You are sexually active with more than one person.  You don't use condoms every time.  You or a partner was diagnosed with a sexually transmitted infection.  If you are at risk for HIV, ask about PrEP medicine to prevent HIV.  Get tested for HIV at least once in your life, whether you are at risk for HIV or not.  Cancer screening tests  Cervical cancer screening: If you have a cervix, begin getting regular cervical cancer screening tests starting at age 21.  Breast cancer scan (mammogram): If you've ever had breasts, begin having regular mammograms starting at age 40. This is a scan to check for breast cancer.  Colon cancer screening: It is important to start screening for colon cancer at age 45.  Have a colonoscopy test every 10 years (or more often if you're at risk) Or, ask your provider about stool tests like a FIT test every year or Cologuard test every 3 years.  To learn more about your testing options, visit:   .  For help making a decision, visit:   https://bit.ly/fm26578.  Prostate cancer screening test: If you have a prostate, ask your care team if a prostate cancer screening test (PSA) at age 55 is right for you.  Lung cancer screening: If you are a current or former smoker ages 50 to 80, ask your care team if ongoing lung cancer screenings are right for you.  For informational purposes only. Not to replace the advice of your health care provider. Copyright   2023 [employer]. All rights reserved. Clinically reviewed by the [facility] Transitions Program. [employer] [id number] - REV 01/24.

## 2025-01-08 NOTE — PROGRESS NOTES
Preventive Care Visit  [facility] DONALD Don MD, Internal Medicine - Pediatrics  Jan 8, 2025      Assessment & Plan     Encounter for Medicare annual wellness exam  d/w pt preventative care measures including seat belt use, bike helmet, moderation of EtOH, avoiding tobacco, avoiding excessive sun exposure/sunscreen, wt management or wt loss if BMI > 30, need to screen for lipid disorders, mood disorders, CAD risk factors, etc. Also discussed accident prevention and future RHM schedule.   - Pneumococcal 20 Valent Conjugate (PCV20)  - Lipid panel reflex to direct LDL Non-fasting; Future  - REVIEW OF HEALTH MAINTENANCE PROTOCOL ORDERS  - PRIMARY CARE FOLLOW-UP SCHEDULING; Future  - Comprehensive metabolic panel (BMP + Alb, Alk Phos, ALT, AST, Total. Bili, TP); Future  - Vitamin D Deficiency; Future  - CBC with platelets and differential; Future  - Lipid panel reflex to direct LDL Non-fasting  - Comprehensive metabolic panel (BMP + Alb, Alk Phos, ALT, AST, Total. Bili, TP)  - Vitamin D Deficiency  - CBC with platelets and differential    Vasomotor rhinitis  discussed with patient (or patient's parents/caregiver) pathophysiology of condition and treatment options.  has tried floase, will do trail of atrovent and see if helps. New medication(s) indication(s), adverse effects, risks and benefits discussed. consider ENT consult if persits or worse. Patient verbalized understanding and is agreeable to this plan.   - ipratropium (ATROVENT) 0.06 % nasal spray; Spray 2 sprays into both nostrils 4 times daily.    Meningioma (H)  discussed with patient (or patient's parents/caregiver) pathophysiology of condition and treatment options.  folllwiing with nurosurg, asymptomatic and no change on last MRI  - Comprehensive metabolic panel (BMP + Alb, Alk Phos, ALT, AST, Total. Bili, TP); Future  - CBC with platelets and differential; Future  - Comprehensive metabolic panel (BMP + Alb, Alk Phos, ALT, AST,  Total. Bili, TP)  - CBC with platelets and differential    Vitamin D deficiency  discussed with patient (or patient's parents/caregiver) pathophysiology of condition and treatment options.    - Vitamin D Deficiency; Future  - Vitamin D Deficiency    Osteoporosis, unspecified osteoporosis type, unspecified pathological fracture presence  discussed with patient (or patient's parents/caregiver) pathophysiology of condition and treatment options.  reviwed dexa, continue calcium/vit d.   - Comprehensive metabolic panel (BMP + Alb, Alk Phos, ALT, AST, Total. Bili, TP); Future  - Comprehensive metabolic panel (BMP + Alb, Alk Phos, ALT, AST, Total. Bili, TP)    Elevated liver function tests  discussed with patient (or patient's parents/caregiver) pathophysiology of condition and treatment options. will recehk today   - Comprehensive metabolic panel (BMP + Alb, Alk Phos, ALT, AST, Total. Bili, TP); Future  - Comprehensive metabolic panel (BMP + Alb, Alk Phos, ALT, AST, Total. Bili, TP)    CARDIOVASCULAR SCREENING; LDL GOAL LESS THAN 160  - Lipid panel reflex to direct LDL Non-fasting; Future  - Comprehensive metabolic panel (BMP + Alb, Alk Phos, ALT, AST, Total. Bili, TP); Future  - Lipid panel reflex to direct LDL Non-fasting  - Comprehensive metabolic panel (BMP + Alb, Alk Phos, ALT, AST, Total. Bili, TP)    Encounter for immunization  - Pneumococcal 20 Valent Conjugate (PCV20)      Counseling  Appropriate preventive services were addressed with this patient via screening, questionnaire, or discussion as appropriate for fall prevention, nutrition, physical activity, Tobacco-use cessation, social engagement, weight loss and cognition.  Checklist reviewing preventive services available has been given to the patient.  Reviewed patient's diet, addressing concerns and/or questions.   She is at risk for lack of exercise and has been provided with information to increase physical activity for the benefit of her well-being.        Return in about 1 year (around 1/8/2026) for Routine Visit, or sooner if symptoms persist or worsen.      Dae Charlton is a 64 year old, presenting for the following:  Wellness Visit        1/8/2025    10:54 AM   Additional Questions   Roomed by Anette   Accompanied by self         1/8/2025    10:54 AM   Patient Reported Additional Medications   Patient reports taking the following new medications none           HPI  Patient here for annual wellness visit,  had appointment today but they switched as he had something comeup, patient aware hasn't been 1 year since last visit. doing well, had follow-up with mary and plan to see again in one year. Still some ongoing L lsied nasal and ear symptoms Runny nose and congestion at times. used the flonase as needed and didn't help much. no fevers, no blood discharge. comes and goes. no headaches. mood is good. due for labs. No other concerns or complaints today.           Health Care Directive  Patient has a Health Care Directive on file  Advance care planning document is on file and is current.      1/4/2025   General Health   How would you rate your overall physical health? Excellent   Feel stress (tense, anxious, or unable to sleep) Only a little   (!) STRESS CONCERN      1/4/2025   Nutrition   Diet: Other   If other, please elaborate: I limit dairy and use Lactaid when I do eat dairy.         1/4/2025   Exercise   Days per week of moderate/strenous exercise 3 days   Average minutes spent exercising at this level 30 min         1/4/2025   Social Factors   Frequency of gathering with friends or relatives More than three times a week   Worry food won't last until get money to buy more No   Food not last or not have enough money for food? No   Do you have housing? (Housing is defined as stable permanent housing and does not include staying ouside in a car, in a tent, in an abandoned building, in an overnight shelter, or couch-surfing.) Yes   Are you  worried about losing your housing? No   Lack of transportation? No   Unable to get utilities (heat,electricity)? No         1/4/2025   Fall Risk   Fallen 2 or more times in the past year? No   Trouble with walking or balance? No          1/4/2025   Activities of Daily Living- Home Safety   Needs help with the following daily activites None of the above   Safety concerns in the home None of the above         1/4/2025   Dental   Dentist two times every year? Yes         1/4/2025   Hearing Screening   Hearing concerns? None of the above         1/4/2025   Driving Risk Screening   Patient/family members have concerns about driving No         1/4/2025   General Alertness/Fatigue Screening   Have you been more tired than usual lately? No         1/4/2025   Urinary Incontinence Screening   Bothered by leaking urine in past 6 months No         1/4/2025   TB Screening   Were you born outside of the [place]? No         Today's PHQ-2 Score:       1/7/2025    11:22 AM   PHQ-2 ( 1999 Pf[employer])   Q1: Little interest or pleasure in doing things 0   Q2: Feeling down, depressed or hopeless 0   PHQ-2 Score 0    Q1: Little interest or pleasure in doing things Not at all   Q2: Feeling down, depressed or hopeless Not at all   PHQ-2 Score 0       Patient-reported           1/4/2025   Substance Use   Alcohol more than 3/day or more than 7/wk Not Applicable   Do you have a current opioid prescription? No   How severe/bad is pain from 1 to 10? 0/10 (No Pain)   Do you use any other substances recreationally? No     Social History     Tobacco Use    Smoking status: Never    Smokeless tobacco: Never   Vaping Use    Vaping status: Never Used   Substance Use Topics    Alcohol use: No    Drug use: No           5/24/2023   LAST FHS-7 RESULTS   1st degree relative breast or ovarian cancer No   Any relative bilateral breast cancer No   Any male have breast cancer No   Any ONE woman have BOTH breast AND ovarian cancer No   Any woman with breast cancer  before 50yrs No   2 or more relatives with breast AND/OR ovarian cancer --    Yes   2 or more relatives with breast AND/OR bowel cancer No       Multiple values from one day are sorted in reverse-chronological order        Mammogram Screening - Mammogram every 1-2 years updated in Health Maintenance based on mutual decision making      History of abnormal Pap smear: No - age 65 or older with adequate negative prior screening test results (3 consecutive negative cytology results, 2 consecutive negative cotesting results, or 2 consecutive negative HrHPV test results within 10 years, with the most recent test occurring within the recommended screening interval for the test used)        Latest Ref Rng & Units 1/5/2023    11:07 AM 9/27/2019     8:10 AM 9/27/2019     8:01 AM   PAP / HPV   PAP  Negative for Intraepithelial Lesion or Malignancy (NILM)      PAP (Historical)    NIL    HPV 16 DNA Negative Negative  Negative     HPV 18 DNA Negative Negative  Negative     Other HR HPV Negative Negative  Negative       ASCVD Risk   The 10-year ASCVD risk score (Kvng SOTOMAYOR, et al., 2019) is: 3.8%    Values used to calculate the score:      Age: 64 years      Sex: Female      Is Non- : No      Diabetic: No      Tobacco smoker: No      Systolic Blood Pressure: 100 mmHg      Is BP treated: No      HDL Cholesterol: 56 mg/dL      Total Cholesterol: 235 mg/dL            Reviewed and updated as needed this visit by Provider                      Current providers sharing in care for this patient include:  Patient Care Team:  Neo Don MD as PCP - General (Internal Medicine - Pediatrics)  Chelsea Kenny MD as Hospitalist (Endocrinology, Diabetes, and Metabolism)  Maday Way MD as Assigned Surgical Provider  Chloe Knight PA-C as Assigned Neuroscience Provider  Neo Don MD as Assigned PCP    The following health maintenance items are reviewed in Epic and correct as of  "today:  Health Maintenance   Topic Date Due    Pneumococcal Vaccine: 50+ Years (1 of 1 - PCV) Never done    COVID-19 Vaccine (7 - 2024-25 season) 09/01/2024    LIPID  02/02/2025    MEDICARE ANNUAL WELLNESS VISIT  02/07/2025    ANNUAL REVIEW OF HM ORDERS  02/07/2025    MAMMO SCREENING  05/24/2025    FALL RISK ASSESSMENT  01/08/2026    ADVANCE CARE PLANNING  02/09/2026    DEXA  04/04/2026    GLUCOSE  02/02/2027    COLORECTAL CANCER SCREENING  08/24/2027    DTAP/TDAP/TD IMMUNIZATION (5 - Td or Tdap) 06/24/2029    RSV VACCINE (1 - 1-dose 75+ series) 01/20/2034    HEPATITIS C SCREENING  Completed    HIV SCREENING  Completed    PHQ-2 (once per calendar year)  Completed    INFLUENZA VACCINE  Completed    ZOSTER IMMUNIZATION  Completed    HPV IMMUNIZATION  Aged Out    MENINGITIS IMMUNIZATION  Aged Out    RSV MONOCLONAL ANTIBODY  Aged Out    PAP  Discontinued            Objective    Exam  /70   Temp 97.8  F (36.6  C) (Temporal)   Resp 20   Ht 1.74 m (5' 8.5\")   Wt 65.9 kg (145 lb 3.2 oz)   SpO2 97%   BMI 21.76 kg/m     Estimated body mass index is 21.76 kg/m  as calculated from the following:    Height as of this encounter: 1.74 m (5' 8.5\").    Weight as of this encounter: 65.9 kg (145 lb 3.2 oz).    Physical Exam  GENERAL: healthy, alert and no distress  EYES: Eyes grossly normal to inspection, PERRL and conjunctivae and sclerae normal  HENT: ear canals and TM's normal, nose and mouth covered with mask due to covid; when removed turbinates are emergency room with some boggy mucosa, no purulence or asymmetry. TM within normal limits bilaterally.   NECK: no adenopathy, no asymmetry  RESP: lungs clear to auscultation - no rales, rhonchi or wheezes  CV: regular rate and rhythm, normal S1 S2, no S3 or S4, no murmur, click or rub, no peripheral edema and peripheral pulses strong  ABDOMEN: soft, nontender, no hepatosplenomegaly, no masses and bowel sounds normal  MS: no gross musculoskeletal defects noted, no " edema  SKIN: no suspicious lesions or rashes  NEURO: Normal strength and tone, mentation intact and speech normal  PSYCH: mentation appears normal, affect normal/bright              1/8/2025   Mini Cog   Clock Draw Score 2 Normal   3 Item Recall 3 objects recalled   Mini Cog Total Score 5             2/7/2024   Vision Screen   Patient wears corrective lenses (select all that apply) Worn during vision screen   Vision Screen Results Pass       Signed Electronically by: Neo Don MD

## 2025-03-23 ENCOUNTER — MYC MEDICAL ADVICE (OUTPATIENT)
Dept: PEDIATRICS | Facility: CLINIC | Age: 65
End: 2025-03-23
Payer: MEDICARE

## 2025-06-02 ENCOUNTER — OFFICE VISIT (OUTPATIENT)
Dept: PEDIATRICS | Facility: CLINIC | Age: 65
End: 2025-06-02
Payer: MEDICARE

## 2025-06-02 VITALS
BODY MASS INDEX: 21.88 KG/M2 | TEMPERATURE: 97.6 F | OXYGEN SATURATION: 98 % | DIASTOLIC BLOOD PRESSURE: 70 MMHG | SYSTOLIC BLOOD PRESSURE: 114 MMHG | WEIGHT: 147.7 LBS | HEART RATE: 72 BPM | HEIGHT: 69 IN | RESPIRATION RATE: 18 BRPM

## 2025-06-02 DIAGNOSIS — M54.50 CHRONIC LEFT-SIDED LOW BACK PAIN WITHOUT SCIATICA: ICD-10-CM

## 2025-06-02 DIAGNOSIS — G89.29 CHRONIC LEFT-SIDED LOW BACK PAIN WITHOUT SCIATICA: ICD-10-CM

## 2025-06-02 DIAGNOSIS — J02.9 ALLERGIC PHARYNGITIS: Primary | ICD-10-CM

## 2025-06-02 PROCEDURE — 3078F DIAST BP <80 MM HG: CPT | Performed by: INTERNAL MEDICINE

## 2025-06-02 PROCEDURE — 3074F SYST BP LT 130 MM HG: CPT | Performed by: INTERNAL MEDICINE

## 2025-06-02 PROCEDURE — 1125F AMNT PAIN NOTED PAIN PRSNT: CPT | Performed by: INTERNAL MEDICINE

## 2025-06-02 PROCEDURE — 99213 OFFICE O/P EST LOW 20 MIN: CPT | Performed by: INTERNAL MEDICINE

## 2025-06-02 ASSESSMENT — PAIN SCALES - GENERAL: PAINLEVEL_OUTOF10: MILD PAIN (3)

## 2025-06-02 NOTE — PATIENT INSTRUCTIONS
May take ibuprofen or Aleve (naproxen) as needed for low back pain.     Continue your low back pain stretches and exercises.      Let us know if you have any weakness, numbness, or worsening pain.      Could conisder a hip xray for possible hip osteoarthritis.         With respect to your throat:  may take claritin or Zyrtec (cetirizine), and may try again the flonase or atrovant nasal sprays, over-the-counter.    Saline spray (nonmedicated salt water) in small squirt bottles can be used every hour or two during the day, as can humidifiers during the night.  Steam showers can help keep mucous loose.

## 2025-06-02 NOTE — PROGRESS NOTES
Assessment & Plan     Allergic pharyngitis  Patient Instructions   May take ibuprofen or Aleve (naproxen) as needed for low back pain.     Continue your low back pain stretches and exercises.      Let us know if you have any weakness, numbness, or worsening pain.      Could conisder a hip xray for possible hip osteoarthritis.         With respect to your throat:  may take claritin or Zyrtec (cetirizine), and may try again the flonase or atrovant nasal sprays, over-the-counter.    Saline spray (nonmedicated salt water) in small squirt bottles can be used every hour or two during the day, as can humidifiers during the night.  Steam showers can help keep mucous loose.            Chronic left-sided low back pain without sciatica  If symptoms persist, could consider xray of hip given occasional groin sx.  For now, ongoing ibuprofen or aleve (naproxen) and home physical therapy.                 Dae Charlton is a 65 year old, presenting for the following health issues:  History of Present Illness        6/2/2025    11:29 AM   Additional Questions   Roomed by charmaine   Accompanied by maurizio         6/2/2025    11:29 AM   Patient Reported Additional Medications   Patient reports taking the following new medications maurizio     History of Present Illness       Back Pain:  She presents for follow up of back pain. Patient's back pain is a chronic problem.  Location of back pain:  Left lower back and left middle of back  Description of back pain: dull ache  Back pain spreads: left buttocks    Since patient first noticed back pain, pain is: always present, but gets better and worse  Does back pain interfere with her job:  No       Reason for visit:  -Throat discomfort (reflux, strep, air quality?); increased joint discomfort  Symptom onset:  More than a month  Symptoms include:  Throat discomfort; joint pain  Symptom intensity:  Mild  Symptom progression:  Staying the same  Had these symptoms before:  Yes  Has tried/received  "treatment for these symptoms:  Yes  Previous treatment was successful:  No   She is taking medications regularly.      Scratchy dry throat over a month or so.  Not sure if it is allergy related, or gastroesophageal reflux disease related.  (No current heartburn symptoms this time).  No over-the-counter medications. No sick contacts.    No fevers, cough.    Does a lot of time in gym, doing lots of yelling.     Other issue:  grandchild, holding a lot.  Low back pain and aching.  Can radiated to left buttock and left groin. No B/B symptoms.      Other issue:  dry spot in center of back.                Review of Systems  Constitutional, HEENT, cardiovascular, pulmonary, GI, , musculoskeletal, neuro, skin, endocrine and psych systems are negative, except as otherwise noted.      Objective    /70 (BP Location: Right arm, Patient Position: Sitting, Cuff Size: Adult Regular)   Pulse 72   Temp 97.6  F (36.4  C) (Temporal)   Resp 18   Ht 1.74 m (5' 8.5\")   Wt 67 kg (147 lb 11.2 oz)   LMP  (LMP Unknown)   SpO2 98%   BMI 22.13 kg/m    Body mass index is 22.13 kg/m .  Physical Exam   GENERAL: alert and no distress  EYES: Eyes grossly normal to inspection, PERRL and conjunctivae and sclerae normal  HENT: ear canals and TM's normal, nose and mouth without ulcers or lesions  NECK: no adenopathy, no asymmetry, masses, or scars  RESP: lungs clear to auscultation - no rales, rhonchi or wheezes  CV: regular rate and rhythm, normal S1 S2, no S3 or S4, no murmur, click or rub, no peripheral edema  ABDOMEN: soft, nontender, no hepatosplenomegaly, no masses and bowel sounds normal  MS: no gross musculoskeletal defects noted, no edema  SKIN: no suspicious lesions or rashes  NEURO: Normal strength and tone, mentation intact and speech normal  PSYCH: mentation appears normal, affect normal/bright            Signed Electronically by: Gus Carlos MD    "

## 2025-07-12 ENCOUNTER — HEALTH MAINTENANCE LETTER (OUTPATIENT)
Age: 65
End: 2025-07-12

## 2025-08-02 ENCOUNTER — HEALTH MAINTENANCE LETTER (OUTPATIENT)
Age: 65
End: 2025-08-02

## 2025-08-06 ENCOUNTER — TRANSFERRED RECORDS (OUTPATIENT)
Dept: HEALTH INFORMATION MANAGEMENT | Facility: CLINIC | Age: 65
End: 2025-08-06
Payer: MEDICARE

## 2025-08-12 ENCOUNTER — HOSPITAL ENCOUNTER (OUTPATIENT)
Dept: MRI IMAGING | Facility: CLINIC | Age: 65
Discharge: HOME | End: 2025-08-12
Attending: PHYSICIAN ASSISTANT
Payer: MEDICARE

## 2025-08-12 DIAGNOSIS — D32.9 MENINGIOMA (H): ICD-10-CM

## 2025-08-12 PROCEDURE — 255N000002 HC RX 255 OP 636: Performed by: PHYSICIAN ASSISTANT

## 2025-08-12 PROCEDURE — 70553 MRI BRAIN STEM W/O & W/DYE: CPT

## 2025-08-12 PROCEDURE — A9585 GADOBUTROL INJECTION: HCPCS | Performed by: PHYSICIAN ASSISTANT

## 2025-08-12 RX ORDER — GADOBUTROL 604.72 MG/ML
7 INJECTION INTRAVENOUS ONCE
Status: COMPLETED | OUTPATIENT
Start: 2025-08-12 | End: 2025-08-12

## 2025-08-12 RX ADMIN — GADOBUTROL 7 ML: 604.72 INJECTION INTRAVENOUS at 09:28

## 2025-08-20 ENCOUNTER — OFFICE VISIT (OUTPATIENT)
Dept: NEUROSURGERY | Facility: CLINIC | Age: 65
End: 2025-08-20
Payer: MEDICARE

## 2025-08-20 VITALS
OXYGEN SATURATION: 98 % | SYSTOLIC BLOOD PRESSURE: 99 MMHG | DIASTOLIC BLOOD PRESSURE: 64 MMHG | RESPIRATION RATE: 16 BRPM | HEART RATE: 66 BPM

## 2025-08-20 DIAGNOSIS — D32.9 MENINGIOMA (H): Primary | ICD-10-CM

## (undated) DEVICE — KIT ENDO TURNOVER/PROCEDURE W/CLEAN A SCOPE LINERS 103888

## (undated) DEVICE — ESU GROUND PAD ADULT W/CORD E7507

## (undated) DEVICE — ENDO TRAP POLYP QUICK CATCH 710201

## (undated) DEVICE — ENDO SNARE POLYPECTOMY OVAL 15MM LOOP SD-240U-15

## (undated) RX ORDER — FENTANYL CITRATE 50 UG/ML
INJECTION, SOLUTION INTRAMUSCULAR; INTRAVENOUS
Status: DISPENSED
Start: 2017-08-03